# Patient Record
Sex: FEMALE | Race: WHITE | NOT HISPANIC OR LATINO | Employment: FULL TIME | URBAN - METROPOLITAN AREA
[De-identification: names, ages, dates, MRNs, and addresses within clinical notes are randomized per-mention and may not be internally consistent; named-entity substitution may affect disease eponyms.]

---

## 2021-07-20 ENCOUNTER — LAB REQUISITION (OUTPATIENT)
Dept: LAB | Facility: CLINIC | Age: 31
End: 2021-07-20

## 2021-07-20 LAB
PATH REPORT.COMMENTS IMP SPEC: NORMAL
PATH REPORT.FINAL DX SPEC: NORMAL
PATH REPORT.GROSS SPEC: NORMAL
PATH REPORT.MICROSCOPIC SPEC OTHER STN: NORMAL
PATH REPORT.MICROSCOPIC SPEC OTHER STN: NORMAL
PATH REPORT.RELEVANT HX SPEC: NORMAL
PATH REPORT.RELEVANT HX SPEC: NORMAL
PATH REPORT.SITE OF ORIGIN SPEC: NORMAL

## 2021-07-20 PROCEDURE — 88323 CONSLTJ&REPRT MATRL PREP SLD: CPT | Mod: TC | Performed by: PATHOLOGY

## 2021-07-21 ENCOUNTER — TRANSFERRED RECORDS (OUTPATIENT)
Dept: HEALTH INFORMATION MANAGEMENT | Facility: CLINIC | Age: 31
End: 2021-07-21

## 2021-07-22 LAB
ALT SERPL-CCNC: 143 U/L (ref 0–55)
AST SERPL-CCNC: 109 U/L (ref 10–40)
INR (EXTERNAL): 2.3 (ref 0.9–1.1)

## 2021-07-22 NOTE — PROGRESS NOTES
[facility]  Transfer Triage Note    Date of call: 07/22/21  Time of call: 11:41 AM    Is pandemic COVID-19 a concern? NO    Reason for transfer: Further diagnostic work up, management, and consultation for specialized care   Diagnosis: acute hepatic failure    Outside Records: Available  Additional records requested to be faxed to [phone].    Stability of Patient: Patient is vitally stable, with no critical labs, and will likely remain stable throughout the transfer process  ICU: No    Expected Time of Arrival for Transfer: greater than 24 hours    Arrival Location:  [address] [address] Phone: [phone]    Recommendations for Management and Stabilization: Not needed    Additional Comments     Delivered end of January.  Pregnancy complicated by gestational hypertension and mild DM    Requesting transfer for liver failure, presumed autoimmune.    Previously healthy, presented with jaundice and sleral icterus.  Viral hepatitis studies were negative.  Liver biopsies showed non-neoplastic pathology, determined to be autoimmune hepatitis with necrotic collapse.      Discharged on prednisone 40mg daily and followed by GI as an outpatient.  Had labs done yesterday (7/21); however on her way home she had an episode of syncope.  Was hypotensive and hypoglycemic in the ED.  Rec'd fluids and responded appropriately.  Started on abx.    Has remained hypoglycemic with glucose of 61 and rising INR, despite stable lfts.  Concern is for acute hepatitis not responsive to steroids.  Dr. Borden is recommending that she transferred as a higher priority for transplant evaluation and if we are unable to secure a bed in the next 1-2 days, consider transfer to [place].    Rocio Joseph MD

## 2021-07-23 ENCOUNTER — DOCUMENTATION ONLY (OUTPATIENT)
Dept: TRANSPLANT | Facility: CLINIC | Age: 31
End: 2021-07-23

## 2021-07-23 ENCOUNTER — HOSPITAL ENCOUNTER (INPATIENT)
Facility: CLINIC | Age: 31
LOS: 3 days | Discharge: HOME | DRG: 442 | End: 2021-07-26
Attending: INTERNAL MEDICINE | Admitting: INTERNAL MEDICINE
Payer: COMMERCIAL

## 2021-07-23 ENCOUNTER — REFERRAL (OUTPATIENT)
Dept: TRANSPLANT | Facility: CLINIC | Age: 31
End: 2021-07-23

## 2021-07-23 ENCOUNTER — APPOINTMENT (OUTPATIENT)
Dept: GENERAL RADIOLOGY | Facility: CLINIC | Age: 31
DRG: 442 | End: 2021-07-23
Attending: INTERNAL MEDICINE
Payer: COMMERCIAL

## 2021-07-23 DIAGNOSIS — K75.4 AUTOIMMUNE HEPATITIS (H): Primary | ICD-10-CM

## 2021-07-23 DIAGNOSIS — R16.0 LIVER MASSES: Primary | ICD-10-CM

## 2021-07-23 LAB
ABO/RH(D): NORMAL
ALBUMIN SERPL-MCNC: 2.3 G/DL (ref 3.4–5)
ALP SERPL-CCNC: 161 U/L (ref 40–150)
ALT SERPL W P-5'-P-CCNC: 159 U/L (ref 0–50)
ANION GAP SERPL CALCULATED.3IONS-SCNC: 3 MMOL/L (ref 3–14)
ANTIBODY SCREEN: NEGATIVE
APTT PPP: 36 SECONDS (ref 22–38)
AST SERPL W P-5'-P-CCNC: 117 U/L (ref 0–45)
BASOPHILS # BLD AUTO: 0.1 10E3/UL (ref 0–0.2)
BASOPHILS NFR BLD AUTO: 0 %
BILIRUB DIRECT SERPL-MCNC: 8.4 MG/DL (ref 0–0.2)
BILIRUB SERPL-MCNC: 11.6 MG/DL (ref 0.2–1.3)
BUN SERPL-MCNC: 12 MG/DL (ref 7–30)
CALCIUM SERPL-MCNC: 8.2 MG/DL (ref 8.5–10.1)
CHLORIDE BLD-SCNC: 108 MMOL/L (ref 94–109)
CHOLEST SERPL-MCNC: 79 MG/DL
CO2 SERPL-SCNC: 27 MMOL/L (ref 20–32)
CREAT SERPL-MCNC: 0.66 MG/DL (ref 0.52–1.04)
EOSINOPHIL # BLD AUTO: 0 10E3/UL (ref 0–0.7)
EOSINOPHIL NFR BLD AUTO: 0 %
ERYTHROCYTE [DISTWIDTH] IN BLOOD BY AUTOMATED COUNT: 21.4 % (ref 10–15)
FASTING STATUS PATIENT QL REPORTED: NO
FIBRINOGEN PPP-MCNC: 146 MG/DL (ref 170–490)
GFR SERPL CREATININE-BSD FRML MDRD: >90 ML/MIN/1.73M2
GLUCOSE BLD-MCNC: 170 MG/DL (ref 70–99)
GLUCOSE BLDC GLUCOMTR-MCNC: 104 MG/DL (ref 70–99)
HCT VFR BLD AUTO: 39.9 % (ref 35–47)
HDLC SERPL-MCNC: 20 MG/DL
HGB BLD-MCNC: 13.2 G/DL (ref 11.7–15.7)
IMM GRANULOCYTES # BLD: 0.4 10E3/UL
IMM GRANULOCYTES NFR BLD: 4 %
INR PPP: 2.16 (ref 0.85–1.15)
IRON SATN MFR SERPL: 39 % (ref 15–46)
IRON SERPL-MCNC: 95 UG/DL (ref 35–180)
LDLC SERPL CALC-MCNC: 41 MG/DL
LYMPHOCYTES # BLD AUTO: 1.3 10E3/UL (ref 0.8–5.3)
LYMPHOCYTES NFR BLD AUTO: 11 %
MCH RBC QN AUTO: 28.9 PG (ref 26.5–33)
MCHC RBC AUTO-ENTMCNC: 33.1 G/DL (ref 31.5–36.5)
MCV RBC AUTO: 88 FL (ref 78–100)
MONOCYTES # BLD AUTO: 0.6 10E3/UL (ref 0–1.3)
MONOCYTES NFR BLD AUTO: 5 %
NEUTROPHILS # BLD AUTO: 9.6 10E3/UL (ref 1.6–8.3)
NEUTROPHILS NFR BLD AUTO: 80 %
NONHDLC SERPL-MCNC: 59 MG/DL
NRBC # BLD AUTO: 0 10E3/UL
NRBC BLD AUTO-RTO: 0 /100
PHOSPHATE SERPL-MCNC: 1.9 MG/DL (ref 2.5–4.5)
PLATELET # BLD AUTO: 116 10E3/UL (ref 150–450)
POTASSIUM BLD-SCNC: 3.8 MMOL/L (ref 3.4–5.3)
PROT SERPL-MCNC: 5.2 G/DL (ref 6.8–8.8)
RBC # BLD AUTO: 4.56 10E6/UL (ref 3.8–5.2)
SODIUM SERPL-SCNC: 138 MMOL/L (ref 133–144)
SPECIMEN EXPIRATION DATE: NORMAL
T4 FREE SERPL-MCNC: 1.06 NG/DL (ref 0.76–1.46)
TIBC SERPL-MCNC: 246 UG/DL (ref 240–430)
TRIGL SERPL-MCNC: 90 MG/DL
TSH SERPL DL<=0.005 MIU/L-ACNC: 0.17 MU/L (ref 0.4–4)
WBC # BLD AUTO: 12 10E3/UL (ref 4–11)

## 2021-07-23 PROCEDURE — 80321 ALCOHOLS BIOMARKERS 1OR 2: CPT | Performed by: INTERNAL MEDICINE

## 2021-07-23 PROCEDURE — 83550 IRON BINDING TEST: CPT | Performed by: INTERNAL MEDICINE

## 2021-07-23 PROCEDURE — 85730 THROMBOPLASTIN TIME PARTIAL: CPT | Performed by: INTERNAL MEDICINE

## 2021-07-23 PROCEDURE — 86708 HEPATITIS A ANTIBODY: CPT | Performed by: INTERNAL MEDICINE

## 2021-07-23 PROCEDURE — 84100 ASSAY OF PHOSPHORUS: CPT | Performed by: INTERNAL MEDICINE

## 2021-07-23 PROCEDURE — 82107 ALPHA-FETOPROTEIN L3: CPT | Performed by: INTERNAL MEDICINE

## 2021-07-23 PROCEDURE — 85384 FIBRINOGEN ACTIVITY: CPT | Performed by: INTERNAL MEDICINE

## 2021-07-23 PROCEDURE — 86644 CMV ANTIBODY: CPT | Performed by: INTERNAL MEDICINE

## 2021-07-23 PROCEDURE — 87389 HIV-1 AG W/HIV-1&-2 AB AG IA: CPT | Performed by: INTERNAL MEDICINE

## 2021-07-23 PROCEDURE — 99223 1ST HOSP IP/OBS HIGH 75: CPT | Mod: AI | Performed by: STUDENT IN AN ORGANIZED HEALTH CARE EDUCATION/TRAINING PROGRAM

## 2021-07-23 PROCEDURE — 99223 1ST HOSP IP/OBS HIGH 75: CPT | Mod: GC | Performed by: INTERNAL MEDICINE

## 2021-07-23 PROCEDURE — 999N000054 HC STATISTIC EKG NON-CHARGEABLE

## 2021-07-23 PROCEDURE — 80061 LIPID PANEL: CPT | Performed by: INTERNAL MEDICINE

## 2021-07-23 PROCEDURE — 82248 BILIRUBIN DIRECT: CPT | Performed by: INTERNAL MEDICINE

## 2021-07-23 PROCEDURE — 85610 PROTHROMBIN TIME: CPT | Performed by: INTERNAL MEDICINE

## 2021-07-23 PROCEDURE — 87340 HEPATITIS B SURFACE AG IA: CPT | Performed by: INTERNAL MEDICINE

## 2021-07-23 PROCEDURE — 84443 ASSAY THYROID STIM HORMONE: CPT | Performed by: INTERNAL MEDICINE

## 2021-07-23 PROCEDURE — 86780 TREPONEMA PALLIDUM: CPT | Performed by: INTERNAL MEDICINE

## 2021-07-23 PROCEDURE — 86803 HEPATITIS C AB TEST: CPT | Performed by: INTERNAL MEDICINE

## 2021-07-23 PROCEDURE — 85025 COMPLETE CBC W/AUTO DIFF WBC: CPT | Performed by: INTERNAL MEDICINE

## 2021-07-23 PROCEDURE — 86665 EPSTEIN-BARR CAPSID VCA: CPT | Performed by: INTERNAL MEDICINE

## 2021-07-23 PROCEDURE — 86704 HEP B CORE ANTIBODY TOTAL: CPT | Performed by: INTERNAL MEDICINE

## 2021-07-23 PROCEDURE — 86900 BLOOD TYPING SEROLOGIC ABO: CPT | Performed by: INTERNAL MEDICINE

## 2021-07-23 PROCEDURE — 71046 X-RAY EXAM CHEST 2 VIEWS: CPT | Mod: 26 | Performed by: RADIOLOGY

## 2021-07-23 PROCEDURE — 80053 COMPREHEN METABOLIC PANEL: CPT | Performed by: INTERNAL MEDICINE

## 2021-07-23 PROCEDURE — 93010 ELECTROCARDIOGRAM REPORT: CPT | Mod: 59 | Performed by: INTERNAL MEDICINE

## 2021-07-23 PROCEDURE — 120N000002 HC R&B MED SURG/OB UMMC

## 2021-07-23 PROCEDURE — 71046 X-RAY EXAM CHEST 2 VIEWS: CPT

## 2021-07-23 PROCEDURE — 84439 ASSAY OF FREE THYROXINE: CPT | Performed by: INTERNAL MEDICINE

## 2021-07-23 PROCEDURE — 36415 COLL VENOUS BLD VENIPUNCTURE: CPT | Performed by: INTERNAL MEDICINE

## 2021-07-23 PROCEDURE — 86706 HEP B SURFACE ANTIBODY: CPT | Performed by: INTERNAL MEDICINE

## 2021-07-23 RX ORDER — PREDNISONE 5 MG/1
40 TABLET ORAL DAILY
Status: ON HOLD | COMMUNITY
End: 2021-07-26

## 2021-07-23 RX ORDER — LIDOCAINE 40 MG/G
CREAM TOPICAL
Status: DISCONTINUED | OUTPATIENT
Start: 2021-07-23 | End: 2021-07-26 | Stop reason: HOSPADM

## 2021-07-23 ASSESSMENT — ACTIVITIES OF DAILY LIVING (ADL): ADLS_ACUITY_SCORE: 14

## 2021-07-23 NOTE — PHARMACY-ADMISSION MEDICATION HISTORY
Pharmacy Admission Medication History    Admission medication history interview status for the 7/23/2021 admission is complete. See EPIC admission navigator for allergy information, prior to admission medications and immunization status.     Medication history interview source(s): Patient    Medication history resources (including written lists, pill bottles, clinic record): None    Medication history source reliability: Good    Primary pharmacy: [place], [place]    Actions taken by pharmacist (provider contacted, medication changes, etc):None     Changes made to medication history:    Added to medication list: Prednisone tabs.    Additional medication history information: Patient was started on a Prednisone taper 40 mg>>30mg>>20mg>>10 mg>>5mg. After the 40 mg dose taken for 2 weeks, she is supposed to taper down weekly. She is due to complete the 40 mg dose on TUESDAY 7/27    Medication reconciliation/reorder completed by provider prior to medication history? No    Time spent in this activity: 20 MINUTES    Prior to Admission medications    Medication Sig Last Dose Taking? Auth Provider   predniSONE (DELTASONE) 5 MG tablet Take 40 mg by mouth daily 7/23/2021 at Unknown time Yes Unknown, Entered By History

## 2021-07-23 NOTE — PROGRESS NOTES
CT 7/3/21  1.) no PVT    MRI 7/23/21  1.) focal lesions   > hepatic veins not well seen       Recs: needs transfer and full evaluation    Malignancy not in discussion.

## 2021-07-23 NOTE — H&P
[facility]    History and Physical - [facility]        Date of Admission:  7/23/2021    Assessment & Plan      Yaritza Dias is a 30 year old female admitted on 7/23/2021 for evaluation for of acute liver failure. She was recently diagnosed with autoimmune hepatitis and presents for transplant evaluation given worsening of synthetic function.    #Acute to subacute liver failure of unknown etiology (recent MELD-Na 26)  #Severe autoimmune hepatitis with necrosis  Recent diagnosis of autoimmune hepatitis diagnosed after she was found to be jaundiced with elevated LFTs in early July.  Work up including MRI and liver biopsy completed at [facility].  MRI showing multiple liver masses.  Biopsy severe acute hepatitis with hepatocyte loss, no evidence of neoplastic process.  Started on Prednisone 40 on 7/14. HIV negative, ROLANDO positive at 1:640 F-actin negative. Did have some improvement in transaminases, however continued to have a climbing INR, therefore transferred to the [facility] on 7/23 for further work up/ transplant evaluation   MELD-Na score: 25 at 7/23/2021  5:47 PM  MELD score: 25 at 7/23/2021  5:47 PM  Calculated from:  Serum Creatinine: 0.66 mg/dL (Using min of 1 mg/dL) at 7/23/2021  5:47 PM  Serum Sodium: 138 mmol/L (Using max of 137 mmol/L) at 7/23/2021  5:47 PM  Total Bilirubin: 11.6 mg/dL at 7/23/2021  5:47 PM  INR(ratio): 2.16 at 7/23/2021  5:46 PM  Age: 30 years  -GI consult, appreciate recs   -- Starting transplant workup, consult transplant surgery and SW as a part of this evaluation  -- 2D ECHO - OSH TTE did not comment on PA pressures  -- Monitor neurological status closely, contact GI fellow if any concern for worsening mental status  -- BID CMP, CBC, INR. Check factor 5 daily  -- Frequent glucose checks given she is at risk for hypoglycemia. Was on D5 gtt at OSH  -- Panculture and rule out infection with leukocytosis  -- Recommend to  check HSV PCR  -- Check Phosphorus daily, and replace if low.   -- Start Prednisone 30 mg from tomorrow     # SIRS  Met SIRS criteria on presentation Concern for infection on presentation to [facility] and treated with vanc and cefepime.  She had no associated fever or other symptoms. Her cultures have been no growth to date. She did have a mild leukocytosis however was also on steroids at the time.  Overall she is very well appearing without fevers or infectious symptoms.     - Will hold on further antibiotics at this time     #Hypoglycemia  Recent episodes of hypoglycemia while admitted at [facility].  This is concerning for worsening hepatic function, however may also be related to multiple periods of NPO status.  - Will continue to follow glucoses        Diet: Regular Diet Adult    DVT Prophylaxis: Low Risk/Ambulatory with no VTE prophylaxis indicated  Oro Catheter: Not present  Fluids: PO ad india  Central Lines: None  Code Status: Full Code      Risk Factors Present on Admission             # Coagulation Defect: INR = 2.16 (Ref range: 0.85 - 1.15) and/or PTT = 36 Seconds (Ref range: 22 - 38 Seconds) on admission, will monitor for bleeding  # Thrombocytopenia: Plts = 116 10e3/uL (Ref range: 150 - 450 10e3/uL) on admission, will monitor for bleeding      Disposition Plan   Expected discharge: Anticipate >2 days recommended to prior living arrangement once liver function stabilized.     The patient's care was discussed with the Attending Physician, Dr. Schilling.    Eladia Kimbrough MD  [address]  Securely message with the Vocera Web Console (learn more here)  Text page via [employer] Paging/Directory  Please see sign in/sign out for up to date coverage information  ______________________________________________________________________    Chief Complaint   Autoimmune hepatitits    History is obtained from the patient and through chart review    History  "of Present Illness   Yaritza Dias is a 30 year old female who has a history of recent diagnosis of autoimmune hepatitis and is admitted for transplant work up given worsening synthetic function.    She had been overall healthy and gave birth to her daughter 5 months ago.  She was doing well until late June when she started noticing some jaundine. \"She had initially presented to the ED on July 3rd with reports of 1 week history of progressive jaundice and icterus as well as some upper abdominal discomfort. Imaging had revealed multiple liver masses and labs revealed an ALT of 800 with mild coagulopathy and splenomegaly. Hepatitis AB and C labs were unremarkable and patient's ROLANDO was elevated at 1 in 640 with a negative F actin in normal mitochondrial antibody. Tumor markers were also negative and liver biopsy revealed likely severe autoimmune hepatitis with necrosis. She was started on 40 mg of prednisone on July 14th with plans to start azathioprine within 2 weeks. On July 18th she had repeat labs in liver clinic which revealed a worsening alkaline phos and worsening INR. Her case was discussed with Dr. Lombardi (hepatology at the [facility]) and they also got 2nd opinion pathology at the  in which they concur that this is most likely autoimmune hepatitis with necrotic collapse. She was only to present to the [facility] if her INR went above 2.4.\"    She was admitted to [facility] after an episode of syncope with associated hypotension.  She was treated with empiric antibiotics and remained vitally stable after fluid resuscitation.  She did have some intermittent hypoglycemia during her time at [facility].  She was transferred to the [facility] for transplant work up.    Review of Systems    The 10 point Review of Systems is negative other than noted in the HPI or here. She notes that she has noticed feeling puffy and increase in back acne recently.    Past Medical History    I have reviewed this " patient's medical history and updated it with pertinent information if needed.   Past Medical History:   Diagnosis Date     Gestational diabetes         Past Surgical History   I have reviewed this patient's surgical history and updated it with pertinent information if needed.  History reviewed. No pertinent surgical history.     Social History   I have reviewed this patient's social history and updated it with pertinent information if needed. Yaritza Dias lives at home with her , Jose R and their 5 month old daughter Tesha.  Very rare social alcohol use.  No drug use and no supplements .  reports that she has never smoked. She has never used smokeless tobacco. She reports that she does not use drugs.    Family History   No family history of liver disease.  Does have a sister with celiac disease.  No other family members with autoimmune disease    Prior to Admission Medications   Prior to Admission Medications   Prescriptions Last Dose Informant Patient Reported? Taking?   predniSONE (DELTASONE) 5 MG tablet 7/23/2021 at Unknown time  Yes Yes   Sig: Take 40 mg by mouth daily      Facility-Administered Medications: None     Allergies   Allergies   Allergen Reactions     Penicillins GI Disturbance       Physical Exam   Vital Signs: Temp: 97.7  F (36.5  C)   BP: (!) 152/100 (patient very nervous being here. ) Pulse: 110   Resp: 18 SpO2: 98 % O2 Device: None (Room air)    Weight: 202 lbs 6.4 oz    General : Well appearing sitting in bed in no acute distress, pleasant and conversant  HEENT: Scleral icterus, no conjunctival injection, MMM  Chest: Breathing comfortably on room air, lungs clear to auscultation bilaterally , no wheezes or crackles   Heart:  RRR, no murmurs  Abdomen: soft, non-tender, non-distended, no fluid wave  Neuro: Alert and oriented   Ext: Warm and well perfused without edema    Skin: comedones on back and shoulders   Psych: appropriate mood and affect, intermittently tearful     Data   Data  reviewed today: I reviewed all medications, new labs and imaging results over the last 24 hours. I personally reviewed the chest x-ray image(s) showing focal consolidation.    Recent Labs   Lab 07/23/21 1747 07/23/21 1746   WBC  --  12.0*   HGB  --  13.2   MCV  --  88   PLT  --  116*   INR  --  2.16*     --    POTASSIUM 3.8  --    CHLORIDE 108  --    CO2 27  --    BUN 12  --    CR 0.66  --    ANIONGAP 3  --    KENDY 8.2*  --    *  --    ALBUMIN 2.3*  --    PROTTOTAL 5.2*  --    BILITOTAL 11.6*  --    ALKPHOS 161*  --    *  --    *  --        Most Recent 3 CBC's:  Recent Labs   Lab Test 07/23/21 1746   WBC 12.0*   HGB 13.2   MCV 88   *     Most Recent 3 BMP's:  Recent Labs   Lab Test 07/23/21 1747      POTASSIUM 3.8   CHLORIDE 108   CO2 27   BUN 12   CR 0.66   ANIONGAP 3   KENDY 8.2*   *     Most Recent 2 LFT's:  Recent Labs   Lab Test 07/23/21 1747   *   *   ALKPHOS 161*   BILITOTAL 11.6*     Most Recent 3 INR's:  Recent Labs   Lab Test 07/23/21 1746   INR 2.16*     No results found for this or any previous visit (from the past 24 hour(s)).

## 2021-07-23 NOTE — CONSULTS
[facility]    Hepatology Consult    Requesting provider: Dr. Joseph    Consult requested for Elevated bili      HPI:  30 year old female with no significant past medical history, who was recently diagnosed with autoimmune hepatitis with necrosis at outside hospital.    Patient stated that she  delivered her baby in January, which was a prolonged labor and lost significant blood during her delivery, will started on iron supplement after that.  She had her first Covid shot on May 2 followed by the second shot on May 21.  Her  noticed her eyes becoming yellow in late June, she went to them to her PCP and got lab work on 7/2 which was concerning for elevated liver enzymes, she was sent to [facility] where her liver enzymes showed elevated ALT of 800 with mild coagulopathy and imaging showed liver masses and splenomegaly.  She underwent liver biopsy revealing severe necrosis, was started on prednisone 40 mg on July 14.  She had repeat labs done on July 18 showing worsening alkaline phosphatase and INR.  Patient also had hypotension upon her outpatient lab draw, upon second admission her white count was elevated concerning for sepsis, she was treated with antibiotics.  Her INR slightly went up to 2.2, patient was transferred to [facility] for further evaluation.    Denies any alcohol intake, no smoking or illicit drug use.  She works from home.    Visited [place] in March, had a short trip to [place] in June.    Father had coronary artery disease, sister has celiac disease, no other autoimmune disorders in family.    She had mild AST/ALT elevations during her pregnancy. No previous liver imaging      Medical hx Surgical hx   No past medical history on file.  Mild AST/ALT elevation  Vaginal Delivery 1/2021 No past surgical history on file.  None     Medications  Prednisone 40 mg daily    Allergies  Not on File    Family hx Social hx   No family history on file.   Social History      Tobacco Use    Smoking status: Not on file   Substance Use Topics    Alcohol use: Not on file    Drug use: Not on file          Review of systems  A 10-point review of systems was negative.      Examination  BP (!) 152/100   Pulse 110   Temp 97.7  F (36.5  C)   Resp 18   Wt 91.8 kg (202 lb 6.4 oz)   SpO2 98%     Gen- well, NAD, A+Ox3, jaundiced  Eye- EOMI, scleral icterus  ENT- MMM  Lym- no palpable LAD  CVS- RRR  RS- CTA  Abd-non-distended, NT, no fluid thrill  Extr- 1+  LEONORA  Neuro- no asterixis  Skin- no rash    Laboratory  MELD-Na 25    Radiology    MRI ABD 7/3/21:  IMPRESSION:   1.  Multiple liver masses are indeterminate. Regenerative nodules are favored. Metastatic disease is less likely given the patient's age. Atypical presentation of a primary liver mass is possible. Follow-up MRI could be considered. Biopsy could be considered.   2.  Mild splenomegaly is indeterminate. Portal hypertension could contribute to splenomegaly.   3.  Mild pericholecystic fluid is nonspecific. Acute cholecystitis is not excluded. There is no bile duct dilatation. No biliary filling defects or strictures identified.       MRI ABD 7/22/21:  IMPRESSION:   1.  Edematous gallbladder wall thickening has increased. Trace pericholecystic fluid is unchanged. Findings may indicate acalculus cholecystitis. Consider nuclear medicine hepatobiliary scan.     2.  Stable atypical appearance of the liver consistent with nonspecific heterogeneous parenchymal disease. The hepatic veins are poorly visualized. The differential diagnosis includes venoocclusive disease.     3.  Trace peripancreatic fluid has mildly increased and suggests acute pancreatitis.         Liver Bx:  LIVER, NEEDLE BIOPSY:  Hepatitis, severe, grade 4 /4 activity:   -Etiologic considerations include vaccine-induced autoimmune hepatitis vs. Other   -No fibrosis or neoplastic/mass lesion identified      Assessment  30-year-old female with no significant past medical  history, who had recent diagnosis of autoimmune hepatitis.    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 Has a history of mild AST/ALT elevation in the past, may have been predisposed to AI hepatitis, which can also flare after childbirth.     Patient had been started on steroids, AST/ALT improved but BR slowing rising. She had elevated INR which slightly down trended for the past 2 days.  She does not has encephalopathy which goes against acute liver failure.  But given the fact that she had severe necrosis upon her liver biopsy, concern is that she is at risk for progression to liver failure requiring transplant. If her liver regenerates she will likely have scarring in her areas of necrosis.  Her case was discussed in tumor conference, imaging findings not felt to be c/w malignancy - felt to represent area of necrosis and hepatitis.     Recommend close monitoring for worsening liver function and encephalopathy, starting liver transplant evaluation.     Her phosphorus should be checked daily as the severe necrosis in the liver will start regeneration consuming phosphorus and rendering her levels to decrease.    Recommendations  -- Starting transplant workup, consult transplant surgery and SW as a part of this evaluation  -- 2D ECHO - OSH TTE did not comment on PA pressures  -- Monitor neurological status closely, contact GI fellow if any concern for worsening mental status  -- BID CMP, CBC, INR. Check factor 5 daily  -- Frequent glucose checks given she is at risk for hypoglycemia. Was on D5 gtt at OSH  -- Panculture and rule out infection with leukocytosis  -- Recommend to check HSV PCR  -- Check Phosphorus daily, and replace if low.   -- Start Prednisone 30 mg from tomorrow    Thank you for involving us in this patient's care. Please do not hesitate to contact the GI service with any questions or concerns.     Pt care plan discussed with Dr. Borden, hepatology staff  physician.    This note was created with voice recognition software, and while reviewed for accuracy, typos may remain.  Karen Paris MD  Hepatology  #[id number]

## 2021-07-23 NOTE — PROGRESS NOTES
Dr. CHAU Bodren, on -call hepatologist, notified patient admitted to [facility].  On-call transplant coordinator also notified

## 2021-07-23 NOTE — PROGRESS NOTES
Patient admitted from [facility]. Patient is alert and orientated. Skin check completed with mikal rivero. Patient brought purse shirt pants and shoes. Patient is independent in room . Gave the policy regarding covid visiting   And who can visit. Upset that her 5 month daughter can not visit.

## 2021-07-23 NOTE — TELEPHONE ENCOUNTER
New liver referral    Patient currently admitted to [facility] and is process of transferring to [facility] to urgent evaluation.    Recent dx of autoimmune hepatitis with decompensation and indeterminate liver masses seen on recent MRI.    MELD 24 on 7/23/21    Patient currently having issues with hypotension and hypoglycemia.     From GI consult note in [place] 7/21:  HISTORY OF PRESENT ILLNESS: 30 y.o.female who had previously been healthy was recently diagnosed with acute liver failure thought secondary to severe autoimmune hepatitis with necrosis (hepatic masses). She had initially presented to the ED on July 3rd with reports of 1 week history of progressive jaundice and icterus as well as some upper abdominal discomfort. Imaging had revealed multiple liver masses and labs revealed an ALT of 800 with mild coagulopathy and splenomegaly. Hepatitis AB and C labs were unremarkable and patient's ROLANDO was elevated at 1 in 640 with a negative F actin in normal mitochondrial antibody. Tumor markers were also negative and liver biopsy revealed likely severe autoimmune hepatitis with necrosis. She was started on 40 mg of prednisone on July 14th with plans to start azathioprine within 2 weeks. On July 18th she had repeat labs in liver clinic which revealed a worsening alkaline phos and worsening INR. Her case was discussed with Dr. Lombardi (hepatology at the [facility]) and they also got 2nd opinion pathology at the  in which they concur that this is most likely autoimmune hepatitis with necrotic collapse. She was only to present to the [facility] if her INR went above 2.4.    The outpatient plan was to repeat labs this morning and obtain repeat liver MRI tomorrow. She reports she felt well this morning if although she was having some anxiety related to the labs. She is also feeling slightly nauseous but thought that was because she had eaten. Immediately after the lab draw she felt unwell with extreme dizziness and nausea so her   drove her to [facility] ER in which her blood pressure was found to be extremely low with systolic in the 40s to 50s. She was also tachycardic and patient was roomed and started on antibiotics for concern of underlying sepsis. After getting IV fluid she reports she is feeling much better. She denies any fevers or chills at home. She is currently on 40 mg of prednisone once daily and reports weight gain in the last month as opposed to weight loss.     IMPRESSION:  Acute liver failure, stable  Likely severe autoimmune hepatitis with necrotic collapse (seen as masses on imaging)  Hypotension  Tachycardia  Patient with recent diagnosis of acute liver failure and liklely severe autoimmune hepatitis (7/3) was being followed in liver clinic and recently started on prednisone (7/14). She had an ROLANDO of 1 in 640 but negative F actin and pathology was most consistent with autoimmune hepatitis per [facility] pathology and [facility] pathology. She had a bump in alkaline phos and INR on 07/18 and she is feeling very anxious regarding a repeat outpatient lab test this morning. Immediately after the lab draw she felt unwell and weak and was found to have a blood pressure of 40s over 20 is a in the ER. She denies any fevers or chills at home and she has had mild leukocytosis in the last few days but this was after starting prednisone which is expected. She does not appear to be septic and most likely had a vasovagal reaction to the lab draw but given her persistent tachycardia and recently worsening liver function tests despite prednisone, cannot exclude an underlying infectious process now becoming worse on prednisone. Also cannot exclude intrahepatic blocked bile duct secondary to the necrotic masses which could cause cholestasis and ultimately cholangitis. Would recommend keeping patient on antibiotics until we have repeat MRI imaging to compare from her previous study.    In regards to the liver failure, this is  very stable and her INR remains under 2.4. She has no evidence of hepatic encephalopathy and platelets have now normalized. Her ALT has improved on its own from peak at 800 down to 166 and alk-phos only mildly elevated at 151. T bili remains elevated at 12.3 which is consistent with LFT derangements in the setting of autoimmune hepatitis. For now, would continue 40 mg of prednisone daily with daily LFTs and INR. No plans to transfer to the [facility] unless her INR creeps up above 2.4 signifying worsening liver failure.     PLAN/RECOMMENDATIONS:  1. Obtain liver MRI today  2. Diet as tolerated after MRI  3. Continue abx for now.   4. Follow up on blood cultures  5. Trend daily LFts, WBC and INR  6. Continue 40 mg prednisone daily  7. Gi will continue to follow

## 2021-07-24 ENCOUNTER — APPOINTMENT (OUTPATIENT)
Dept: ULTRASOUND IMAGING | Facility: CLINIC | Age: 31
DRG: 442 | End: 2021-07-24
Attending: STUDENT IN AN ORGANIZED HEALTH CARE EDUCATION/TRAINING PROGRAM
Payer: COMMERCIAL

## 2021-07-24 LAB
ALBUMIN SERPL-MCNC: 2 G/DL (ref 3.4–5)
ALBUMIN SERPL-MCNC: 2 G/DL (ref 3.4–5)
ALP SERPL-CCNC: 178 U/L (ref 40–150)
ALT SERPL W P-5'-P-CCNC: 144 U/L (ref 0–50)
AMMONIA PLAS-SCNC: <10 UMOL/L (ref 10–50)
ANION GAP SERPL CALCULATED.3IONS-SCNC: 4 MMOL/L (ref 3–14)
AST SERPL W P-5'-P-CCNC: 138 U/L (ref 0–45)
BILIRUB DIRECT SERPL-MCNC: 7.1 MG/DL (ref 0–0.2)
BILIRUB SERPL-MCNC: 9.3 MG/DL (ref 0.2–1.3)
BUN SERPL-MCNC: 10 MG/DL (ref 7–30)
CALCIUM SERPL-MCNC: 8 MG/DL (ref 8.5–10.1)
CHLORIDE BLD-SCNC: 109 MMOL/L (ref 94–109)
CO2 SERPL-SCNC: 27 MMOL/L (ref 20–32)
CREAT SERPL-MCNC: 0.71 MG/DL (ref 0.52–1.04)
FACT V ACT/NOR PPP: 34 % (ref 60–140)
GFR SERPL CREATININE-BSD FRML MDRD: >90 ML/MIN/1.73M2
GLUCOSE BLD-MCNC: 98 MG/DL (ref 70–99)
GLUCOSE BLDC GLUCOMTR-MCNC: 125 MG/DL (ref 70–99)
HBV CORE AB SERPL QL IA: NONREACTIVE
HBV SURFACE AG SERPL QL IA: NONREACTIVE
HCV AB SERPL QL IA: NONREACTIVE
INR PPP: 2.05 (ref 0.85–1.15)
INR PPP: 2.24 (ref 0.85–1.15)
PHOSPHATE SERPL-MCNC: 4.3 MG/DL (ref 2.5–4.5)
PHOSPHATE SERPL-MCNC: 5 MG/DL (ref 2.5–4.5)
POTASSIUM BLD-SCNC: 3.8 MMOL/L (ref 3.4–5.3)
PROT SERPL-MCNC: 4.8 G/DL (ref 6.8–8.8)
SODIUM SERPL-SCNC: 140 MMOL/L (ref 133–144)
T PALLIDUM AB SER QL: NONREACTIVE

## 2021-07-24 PROCEDURE — 93971 EXTREMITY STUDY: CPT | Mod: 26 | Performed by: RADIOLOGY

## 2021-07-24 PROCEDURE — 999N000147 HC STATISTIC PT IP EVAL DEFER

## 2021-07-24 PROCEDURE — 85610 PROTHROMBIN TIME: CPT | Performed by: STUDENT IN AN ORGANIZED HEALTH CARE EDUCATION/TRAINING PROGRAM

## 2021-07-24 PROCEDURE — 82140 ASSAY OF AMMONIA: CPT | Performed by: STUDENT IN AN ORGANIZED HEALTH CARE EDUCATION/TRAINING PROGRAM

## 2021-07-24 PROCEDURE — 80053 COMPREHEN METABOLIC PANEL: CPT | Performed by: STUDENT IN AN ORGANIZED HEALTH CARE EDUCATION/TRAINING PROGRAM

## 2021-07-24 PROCEDURE — 84100 ASSAY OF PHOSPHORUS: CPT | Performed by: INTERNAL MEDICINE

## 2021-07-24 PROCEDURE — 36415 COLL VENOUS BLD VENIPUNCTURE: CPT | Performed by: STUDENT IN AN ORGANIZED HEALTH CARE EDUCATION/TRAINING PROGRAM

## 2021-07-24 PROCEDURE — 93971 EXTREMITY STUDY: CPT | Mod: RT

## 2021-07-24 PROCEDURE — 250N000009 HC RX 250: Performed by: INTERNAL MEDICINE

## 2021-07-24 PROCEDURE — 85220 BLOOC CLOT FACTOR V TEST: CPT | Performed by: STUDENT IN AN ORGANIZED HEALTH CARE EDUCATION/TRAINING PROGRAM

## 2021-07-24 PROCEDURE — 99233 SBSQ HOSP IP/OBS HIGH 50: CPT | Performed by: STUDENT IN AN ORGANIZED HEALTH CARE EDUCATION/TRAINING PROGRAM

## 2021-07-24 PROCEDURE — 36415 COLL VENOUS BLD VENIPUNCTURE: CPT | Performed by: INTERNAL MEDICINE

## 2021-07-24 PROCEDURE — 250N000012 HC RX MED GY IP 250 OP 636 PS 637: Performed by: STUDENT IN AN ORGANIZED HEALTH CARE EDUCATION/TRAINING PROGRAM

## 2021-07-24 PROCEDURE — 999N000111 HC STATISTIC OT IP EVAL DEFER

## 2021-07-24 PROCEDURE — 258N000003 HC RX IP 258 OP 636: Performed by: INTERNAL MEDICINE

## 2021-07-24 PROCEDURE — 120N000002 HC R&B MED SURG/OB UMMC

## 2021-07-24 PROCEDURE — 84100 ASSAY OF PHOSPHORUS: CPT | Performed by: STUDENT IN AN ORGANIZED HEALTH CARE EDUCATION/TRAINING PROGRAM

## 2021-07-24 RX ADMIN — Medication 15 MMOL: at 02:05

## 2021-07-24 RX ADMIN — PREDNISONE 30 MG: 20 TABLET ORAL at 08:33

## 2021-07-24 RX ADMIN — Medication 15 MMOL: at 04:24

## 2021-07-24 ASSESSMENT — MIFFLIN-ST. JEOR: SCORE: 1630.12

## 2021-07-24 ASSESSMENT — ACTIVITIES OF DAILY LIVING (ADL)
ADLS_ACUITY_SCORE: 14

## 2021-07-24 NOTE — PLAN OF CARE
[id number]: Patient denies pain. Good appetite with meals. Up independently. Voiding spontaneously in adequate amounts. Phosphorus level 4.3. Went down to the [facility] area for an hour or so to visit with 5 month old daughter. Echo to be done tomorrow due to staffing. Right arm ultrasound completed and many labs drawn throughout the day. Will continue to with plan of care.

## 2021-07-24 NOTE — PROGRESS NOTES
07/24/21 0900   Appointment Canceled   Appointment Canceled Other (see Cancel Comments row)   Cancel Comments PT 5B: defer/cancel. Referral received. Pt walking 500' independently managing IV pole and performing 4 stairs without difficulty. No skilled PT needed at this time to discharge home IND. Will complete order and sign off.   Signing Clinician's Name / Credentials   Signing clinician's name / credentials Francisca Barajas, TANYA   Quick Adds   Rehab Discipline PT

## 2021-07-24 NOTE — PLAN OF CARE
Pt is AO*4, neuros intact. Denies pain and nausea. Pt is up independent, no BM overnight voids adequately. Pt has L IV that is running phosphate recheck will be at 1030. Breathing comfortably on RA. ON regular diet. Slept throughout shift. Follow plan of care.     Eileen Witt RN on 7/24/2021 at 6:35 AM

## 2021-07-24 NOTE — PLAN OF CARE
[facility] possible [facility] transplant workup. Still needs a urine did urinate and forgot to use hat. Phos will need to be replaced and is ordered. Patient thinks she maybe getting her menses. Patient emotional at times does have a 5 month old baby at home.offered lots of emotional time

## 2021-07-24 NOTE — PROGRESS NOTES
"CLINICAL NUTRITION SERVICES - ASSESSMENT NOTE     Nutrition Prescription    RECOMMENDATIONS FOR MDs/PROVIDERS TO ORDER:  Continue with diet as tolerated     Malnutrition Status:    Patient does not meet two of the established criteria necessary for diagnosing malnutrition    Recommendations already ordered by Registered Dietitian (RD):  Discussed pre-transplant dietary guidelines with patient     Future/Additional Recommendations:  PO adequacy        REASON FOR ASSESSMENT  Yaritza Dias is a/an 30 year old female assessed by the dietitian for Provider Order - Potential Liver Transplant Recipient Evaluation, Assess and educate SOT eval     Obtained from chart review:  Admitted on 2021 for evaluation for of acute liver failure ( started in ). She was recently diagnosed with autoimmune hepatitis and presents for transplant evaluation given worsening of synthetic function.    PMH: Gestational diabetes     MRI showing multiple liver masses.  Biopsy severe acute hepatitis with hepatocyte loss, no evidence of neoplastic process.  Started on Prednisone 40 on     MELD:25    NUTRITION HISTORY  Visited with patient today. Patient has a 5 month old baby, not breast feeding anymore.     Her PO intake has been good PTA.   Bkf: usually skips bkf  Lunch: sandwich type meal  Dinner: regular home cooked meal  Snacks in between ( fresh fruits)    CURRENT NUTRITION ORDERS  Diet: Regular  Intake/Tolerance: per above     LABS  Total bili: 11.6 ()--> down to 9.3 (today),   INR: 2.16  Ammonia: <10    B ( has had recent episodes of hypoglycemia, ?? worsening hepatic synthetic function)    BUN:10, Cr:0.71 (normal range)    Phos:1.9 (admit )--> replaced to 5.0 today  K+: 3.8,     MEDICATIONS  To start on prednisone 30 mg     ANTHROPOMETRICS  Height: 162.6 cm (5' 4.016\")  Most Recent Weight: 91.8 kg (202 lb 6.4 oz)    IBW: 54.6 kg ( 168% IBW)  BMI: 34.72 kg /m2 Obesity Grade I BMI 30-34.9 - BMI within appropriate " age for SOT candidacy     Weight History: Pre-pregnancy wt: patient was not sure of UBW  or pre-pregnancy wt     Wt Readings from Last 10 Encounters:   07/23/21 91.8 kg (202 lb 6.4 oz)       Dosing Weight: 64 kg adjusted wt from admit wt of 91.8 kg and IBW of 54.6 kg     ASSESSED NUTRITION NEEDS  Estimated Energy Needs: ~ 1600 kcals/day (~ 25 kcals/kg)  Justification: Maintenance  Estimated Protein Needs: 64- 77  grams protein/day (1 - 1.2 grams of pro/kg)  Justification: Maintenance, higher end for repletion   Estimated Fluid Needs:  (1 mL/kcal)   Justification: Maintenance    PHYSICAL FINDINGS  See malnutrition section below.  No Ascites  No Edema     MALNUTRITION  % Intake: Decreased intake does not meet criteria  % Weight Loss: Unable to assess  Subcutaneous Fat Loss: None observed  Muscle Loss: None observed  Fluid Accumulation/Edema: None noted  Malnutrition Diagnosis: Patient does not meet two of the established criteria necessary for diagnosing malnutrition    NUTRITION DIAGNOSIS  Predicted inadequate nutrient intake related to presentation with autoimmune liver dz     INTERVENTIONS  Implementation  Nutrition Education: Provided education on nutrition pre transplant, encouraged continuing to follow low sodium diet and consuming adequate protein     Goals  Patient to consume % of nutritionally adequate meal trays TID, or the equivalent with supplements/snacks.     Monitoring/Evaluation  Progress toward goals will be monitored and evaluated per protocol.    Rosa Isbell RD/LU  Pager [phone]

## 2021-07-24 NOTE — PROGRESS NOTES
[facility]    Progress Note - Radha 5 Service        Date of Admission:  7/23/2021    Assessment & Plan             Yaritza Dias is a 30 year old female admitted on 7/23/2021 for evaluation for of acute liver failure. She was recently diagnosed with autoimmune hepatitis and presents for transplant evaluation given worsening of synthetic function.     #Acute to subacute liver failure of unknown etiology (recent MELD-Na 26)  #Severe autoimmune hepatitis with necrosis  Recent diagnosis of autoimmune hepatitis diagnosed after she was found to be jaundiced with elevated LFTs in early July.  Work up including MRI and liver biopsy completed at [facility].  MRI showing multiple liver masses.  Biopsy severe acute hepatitis with hepatocyte loss, no evidence of neoplastic process.  Started on Prednisone 40 on 7/14. HIV negative, ROLANDO positive at 1:640 F-actin negative. Did have some improvement in transaminases, however continued to have a climbing INR, therefore transferred to the [facility] on 7/23 for further work up/ transplant evaluation   MELD-Na score: 24 at 7/24/2021  6:18 AM  MELD score: 24 at 7/24/2021  6:18 AM  Calculated from:  Serum Creatinine: 0.71 mg/dL (Using min of 1 mg/dL) at 7/24/2021  6:18 AM  Serum Sodium: 140 mmol/L (Using max of 137 mmol/L) at 7/24/2021  6:18 AM  Total Bilirubin: 9.3 mg/dL at 7/24/2021  6:18 AM  INR(ratio): 2.16 at 7/23/2021  5:46 PM  Age: 30 years  -GI consult, appreciate recs   -- Starting transplant workup, consult transplant surgery and SW as a part of this evaluation  -- 2D ECHO - OSH TTE did not comment on PA pressures  -- Monitor neurological status closely, contact GI fellow if any concern for worsening mental status  -- Daily CMP, CBC, INR, phosphorus and factor 5   -- Frequent glucose checks given she is at risk for hypoglycemia. Was on D5 gtt at OSH  -- Panculture and rule out infection with leukocytosis  -- HSV  PCR pending  -- phosphorus replacement protocol  -- Start Prednisone 30 mg from tomorrow     #Right arm swelling   Notes some right arm swelling.  Had an IV in the arm previously  - RUE ultrasound     # SIRS  Met SIRS criteria on presentation Concern for infection on presentation to [facility] and treated with vanc and cefepime.  She had no associated fever or other symptoms. Her cultures have been no growth to date. She did have a mild leukocytosis however was also on steroids at the time.  Overall she is very well appearing without fevers or infectious symptoms.     - Will hold on further antibiotics at this time      #Hypoglycemia  Recent episodes of hypoglycemia while admitted at [facility].  This is concerning for worsening hepatic function, however may also be related to multiple periods of NPO status.  Has been normoglycemic since admission.  - glucose checks PRN for symptoms of hypoglycemia       Diet: Regular Diet Adult    DVT Prophylaxis: Pneumatic Compression Devices  Oro Catheter: Not present  Fluids: PO ad ind[place]   Central Lines: None  Code Status: Full Code      Disposition Plan   Expected discharge:  recommended to prior living arrangement once transplant work up completed.     The patient's care was discussed with the Attending Physician, Dr. Schilling and Patient.    Eladia Kimbrough MD  [address]  Securely message with the Vocera Web Console (learn more here)  Text page via [employer] Paging/Directory  Please see sign in/sign out for up to date coverage information    Risk Factors Present on Admission             # Coagulation Defect: INR = 2.16 (Ref range: 0.85 - 1.15) and/or PTT = 36 Seconds (Ref range: 22 - 38 Seconds) on admission, will monitor for bleeding  # Thrombocytopenia: Plts = 116 10e3/uL (Ref range: 150 - 450 10e3/uL) on admission, will monitor for bleeding       ______________________________________________________________________    Interval History   No acute events overnight, nursing notes review. Afebrile and vitally stable.  Denies pain and overall feels well.  Notes that mostly she misses her daughter and is hopeful that she will be able to see her.    Data reviewed today: I reviewed all medications, new labs and imaging results over the last 24 hours. I personally reviewed no images or EKG's today.    Physical Exam   Vital Signs: Temp: (!) 96.7  F (35.9  C) Temp src: Oral BP: (!) 142/82 Pulse: 83   Resp: 16 SpO2: 98 % O2 Device: None (Room air)    Weight: 202 lbs 6.4 oz     General : Well appearing sitting in bed in no acute distress, pleasant and conversant  HEENT: Scleral icterus, no conjunctival injection, MMM  Chest: Breathing comfortably on room air, lungs clear to auscultation bilaterally , no wheezes or crackles   Heart:  RRR, no murmurs  Abdomen: soft, non-tender, non-distended, no fluid wave  Neuro: Alert and oriented   Ext: right arm with some swelling and mild discomfort, no significant redness, small ecchymosis from prior IV site, no gross MSK defects   Skin: comedones on back and shoulders   Psych: appropriate mood and affect, intermittently tearful      Data   Recent Labs   Lab 07/24/21  1208 07/24/21  0618 07/23/21  2210 07/23/21  1747 07/23/21  1746   WBC  --   --   --   --  12.0*   HGB  --   --   --   --  13.2   MCV  --   --   --   --  88   PLT  --   --   --   --  116*   INR  --  2.05*  --   --  2.16*   NA  --  140  --  138  --    POTASSIUM  --  3.8  --  3.8  --    CHLORIDE  --  109  --  108  --    CO2  --  27  --  27  --    BUN  --  10  --  12  --    CR  --  0.71  --  0.66  --    ANIONGAP  --  4  --  3  --    KENDY  --  8.0*  --  8.2*  --    * 98 104* 170*  --    ALBUMIN  --  2.0*  2.0*  --  2.3*  --    PROTTOTAL  --  4.8*  --  5.2*  --    BILITOTAL  --  9.3*  --  11.6*  --    ALKPHOS  --  178*  --  161*  --    ALT  --  144*  --  159*  --     AST  --  138*  --  117*  --      Recent Results (from the past 24 hour(s))   XR Chest 2 Views    Narrative    EXAM: XR CHEST 2 VW  7/23/2021 6:19 PM     HISTORY:  LT eval       COMPARISON:  Chest abdomen pelvis CT 7/3/2021    FINDINGS: PA and lateral radiographs of the chest. The  cardiomediastinal silhouette is within normal limits. No pleural  effusion or pneumothorax. No focal airspace opacity. The visualized  upper abdomen is unremarkable. No acute osseous abnormality.  Interposition of bowel beneath the right hemidiaphragm.      Impression    IMPRESSION: No focal airspace disease.    I have personally reviewed the examination and initial interpretation  and I agree with the findings.    ALESSANDRO URIAS MD         SYSTEM ID:  [id number]     Medications       predniSONE  30 mg Oral Daily     sodium chloride (PF)  3 mL Intracatheter Q8H

## 2021-07-24 NOTE — PLAN OF CARE
Pt is not appropriate for skilled OT services at this time due to IND with I/ADLs & mobility. Will defer at this time. Plan was discussed with PT.  Will D/C current OT orders. Thank you.    7/24/2021 by Katrina Bethea, OT, OTR/L

## 2021-07-25 ENCOUNTER — APPOINTMENT (OUTPATIENT)
Dept: GENERAL RADIOLOGY | Facility: CLINIC | Age: 31
DRG: 442 | End: 2021-07-25
Attending: INTERNAL MEDICINE
Payer: COMMERCIAL

## 2021-07-25 ENCOUNTER — APPOINTMENT (OUTPATIENT)
Dept: CARDIOLOGY | Facility: CLINIC | Age: 31
DRG: 442 | End: 2021-07-25
Attending: STUDENT IN AN ORGANIZED HEALTH CARE EDUCATION/TRAINING PROGRAM
Payer: COMMERCIAL

## 2021-07-25 LAB
AFP L3 MFR SERPL: 19.7 %
AFP SERPL-MCNC: 19 NG/ML
ALBUMIN SERPL-MCNC: 2.1 G/DL (ref 3.4–5)
ALBUMIN UR-MCNC: NEGATIVE MG/DL
ALP SERPL-CCNC: 169 U/L (ref 40–150)
ALT SERPL W P-5'-P-CCNC: 154 U/L (ref 0–50)
AMMONIA PLAS-SCNC: <10 UMOL/L (ref 10–50)
ANION GAP SERPL CALCULATED.3IONS-SCNC: 5 MMOL/L (ref 3–14)
APPEARANCE UR: ABNORMAL
AST SERPL W P-5'-P-CCNC: 165 U/L (ref 0–45)
BACTERIA #/AREA URNS HPF: ABNORMAL /HPF
BILIRUB DIRECT SERPL-MCNC: 7.2 MG/DL (ref 0–0.2)
BILIRUB SERPL-MCNC: 9 MG/DL (ref 0.2–1.3)
BILIRUB UR QL STRIP: ABNORMAL
BUN SERPL-MCNC: 10 MG/DL (ref 7–30)
CALCIUM SERPL-MCNC: 7.8 MG/DL (ref 8.5–10.1)
CAOX CRY #/AREA URNS HPF: ABNORMAL /HPF
CHLORIDE BLD-SCNC: 110 MMOL/L (ref 94–109)
CO2 SERPL-SCNC: 26 MMOL/L (ref 20–32)
COLOR UR AUTO: ABNORMAL
CREAT SERPL-MCNC: 0.69 MG/DL (ref 0.52–1.04)
CRP SERPL-MCNC: 5.7 MG/L (ref 0–8)
ERYTHROCYTE [DISTWIDTH] IN BLOOD BY AUTOMATED COUNT: 21.6 % (ref 10–15)
FACT V ACT/NOR PPP: 35 % (ref 60–140)
GFR SERPL CREATININE-BSD FRML MDRD: >90 ML/MIN/1.73M2
GLUCOSE BLD-MCNC: 59 MG/DL (ref 70–99)
GLUCOSE BLDC GLUCOMTR-MCNC: 109 MG/DL (ref 70–99)
GLUCOSE BLDC GLUCOMTR-MCNC: 123 MG/DL (ref 70–99)
GLUCOSE BLDC GLUCOMTR-MCNC: 169 MG/DL (ref 70–99)
GLUCOSE UR STRIP-MCNC: NEGATIVE MG/DL
HCT VFR BLD AUTO: 42.1 % (ref 35–47)
HGB BLD-MCNC: 14.1 G/DL (ref 11.7–15.7)
HGB UR QL STRIP: ABNORMAL
INR PPP: 2 (ref 0.85–1.15)
KETONES UR STRIP-MCNC: NEGATIVE MG/DL
LACTATE SERPL-SCNC: 1.5 MMOL/L (ref 0.7–2)
LEUKOCYTE ESTERASE UR QL STRIP: NEGATIVE
LVEF ECHO: NORMAL
MCH RBC QN AUTO: 29.1 PG (ref 26.5–33)
MCHC RBC AUTO-ENTMCNC: 33.5 G/DL (ref 31.5–36.5)
MCV RBC AUTO: 87 FL (ref 78–100)
MUCOUS THREADS #/AREA URNS LPF: PRESENT /LPF
NITRATE UR QL: NEGATIVE
PH UR STRIP: 6.5 [PH] (ref 5–7)
PHOSPHATE SERPL-MCNC: 4 MG/DL (ref 2.5–4.5)
PLATELET # BLD AUTO: 116 10E3/UL (ref 150–450)
POTASSIUM BLD-SCNC: 3.6 MMOL/L (ref 3.4–5.3)
PROCALCITONIN SERPL-MCNC: 0.3 NG/ML
PROT SERPL-MCNC: 5 G/DL (ref 6.8–8.8)
RBC # BLD AUTO: 4.84 10E6/UL (ref 3.8–5.2)
RBC URINE: 65 /HPF
SODIUM SERPL-SCNC: 141 MMOL/L (ref 133–144)
SP GR UR STRIP: 1.01 (ref 1–1.03)
SQUAMOUS EPITHELIAL: 1 /HPF
TRANSITIONAL EPI: <1 /HPF
UROBILINOGEN UR STRIP-MCNC: NORMAL MG/DL
WBC # BLD AUTO: 21.7 10E3/UL (ref 4–11)
WBC URINE: 3 /HPF

## 2021-07-25 PROCEDURE — 36415 COLL VENOUS BLD VENIPUNCTURE: CPT | Performed by: INTERNAL MEDICINE

## 2021-07-25 PROCEDURE — 83605 ASSAY OF LACTIC ACID: CPT | Performed by: INTERNAL MEDICINE

## 2021-07-25 PROCEDURE — 81001 URINALYSIS AUTO W/SCOPE: CPT | Performed by: INTERNAL MEDICINE

## 2021-07-25 PROCEDURE — 71046 X-RAY EXAM CHEST 2 VIEWS: CPT

## 2021-07-25 PROCEDURE — 84100 ASSAY OF PHOSPHORUS: CPT | Performed by: STUDENT IN AN ORGANIZED HEALTH CARE EDUCATION/TRAINING PROGRAM

## 2021-07-25 PROCEDURE — 99232 SBSQ HOSP IP/OBS MODERATE 35: CPT | Performed by: STUDENT IN AN ORGANIZED HEALTH CARE EDUCATION/TRAINING PROGRAM

## 2021-07-25 PROCEDURE — 86140 C-REACTIVE PROTEIN: CPT | Performed by: INTERNAL MEDICINE

## 2021-07-25 PROCEDURE — 82140 ASSAY OF AMMONIA: CPT | Performed by: STUDENT IN AN ORGANIZED HEALTH CARE EDUCATION/TRAINING PROGRAM

## 2021-07-25 PROCEDURE — 85610 PROTHROMBIN TIME: CPT | Performed by: STUDENT IN AN ORGANIZED HEALTH CARE EDUCATION/TRAINING PROGRAM

## 2021-07-25 PROCEDURE — 36415 COLL VENOUS BLD VENIPUNCTURE: CPT | Performed by: STUDENT IN AN ORGANIZED HEALTH CARE EDUCATION/TRAINING PROGRAM

## 2021-07-25 PROCEDURE — 80307 DRUG TEST PRSMV CHEM ANLYZR: CPT | Performed by: INTERNAL MEDICINE

## 2021-07-25 PROCEDURE — 85220 BLOOC CLOT FACTOR V TEST: CPT | Performed by: STUDENT IN AN ORGANIZED HEALTH CARE EDUCATION/TRAINING PROGRAM

## 2021-07-25 PROCEDURE — 82248 BILIRUBIN DIRECT: CPT | Performed by: STUDENT IN AN ORGANIZED HEALTH CARE EDUCATION/TRAINING PROGRAM

## 2021-07-25 PROCEDURE — 85027 COMPLETE CBC AUTOMATED: CPT | Performed by: STUDENT IN AN ORGANIZED HEALTH CARE EDUCATION/TRAINING PROGRAM

## 2021-07-25 PROCEDURE — 87040 BLOOD CULTURE FOR BACTERIA: CPT | Performed by: INTERNAL MEDICINE

## 2021-07-25 PROCEDURE — 99233 SBSQ HOSP IP/OBS HIGH 50: CPT | Performed by: INTERNAL MEDICINE

## 2021-07-25 PROCEDURE — 87529 HSV DNA AMP PROBE: CPT | Performed by: STUDENT IN AN ORGANIZED HEALTH CARE EDUCATION/TRAINING PROGRAM

## 2021-07-25 PROCEDURE — 93306 TTE W/DOPPLER COMPLETE: CPT

## 2021-07-25 PROCEDURE — 250N000012 HC RX MED GY IP 250 OP 636 PS 637: Performed by: INTERNAL MEDICINE

## 2021-07-25 PROCEDURE — 93306 TTE W/DOPPLER COMPLETE: CPT | Mod: 26 | Performed by: INTERNAL MEDICINE

## 2021-07-25 PROCEDURE — 84145 PROCALCITONIN (PCT): CPT | Performed by: INTERNAL MEDICINE

## 2021-07-25 PROCEDURE — 120N000002 HC R&B MED SURG/OB UMMC

## 2021-07-25 PROCEDURE — 84702 CHORIONIC GONADOTROPIN TEST: CPT | Performed by: STUDENT IN AN ORGANIZED HEALTH CARE EDUCATION/TRAINING PROGRAM

## 2021-07-25 PROCEDURE — 71046 X-RAY EXAM CHEST 2 VIEWS: CPT | Mod: 26 | Performed by: RADIOLOGY

## 2021-07-25 RX ORDER — PREDNISONE 20 MG/1
40 TABLET ORAL DAILY
Status: DISCONTINUED | OUTPATIENT
Start: 2021-07-25 | End: 2021-07-25

## 2021-07-25 RX ADMIN — PREDNISONE 40 MG: 20 TABLET ORAL at 09:47

## 2021-07-25 ASSESSMENT — ACTIVITIES OF DAILY LIVING (ADL)
ADLS_ACUITY_SCORE: 14

## 2021-07-25 ASSESSMENT — MIFFLIN-ST. JEOR: SCORE: 1639.65

## 2021-07-25 NOTE — PLAN OF CARE
[id number] WBC count elevated today. No fevers; tachy at start of shift endorse elevated HR related to recent lab draw. Sohail pain. Chest xray and echo completed. Denies abdominal pain. BS x2 daily and at bedtime. UA to be collected. A/O x4. Plan of care: transplant work up.

## 2021-07-25 NOTE — PLAN OF CARE
".Assumed cares 1900-0730. Pt rounded on hourly.     .BP (!) 149/77 (BP Location: Left arm)   Pulse (!) 122   Temp 96.8  F (36  C) (Oral)   Resp 18   Ht 1.626 m (5' 4.02\")   Wt 93 kg (205 lb)   SpO2 99%   BMI 35.17 kg/m      Pain: denies  Neuro: Aox4, able to make needs known.  Resp: WDL on RA- denies SOB  Cardiac: WDL- denies chest pains  Skin: intact  GI/: pt voiding spontaneously and adequately  Nutrition: regular diet  Activity: independent  Access: PIV saline locked    Events: pt slept throughout the night.    Plan: ECHO to be done today. Continue to follow plan of care and notify MD with changes in condition.     "

## 2021-07-25 NOTE — PROGRESS NOTES
"[facility]    Progress Note - Radha 5 Service        Date of Admission:  7/23/2021    Assessment & Plan      Yaritza Dias is a 30 year old female admitted for evaluation for of acute liver failure. She was recently diagnosed with autoimmune hepatitis at [facility]--per pathology likely vaccine related--and transferred to [facility] on 7/23 for transplant evaluation given worsening of synthetic function.     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  Work up including MRI and liver biopsy completed at [facility].  MRI showing multiple liver masses. ROLANDO positive at 1:640 F-actin negative. Liver biopsy 7/6 showed severe acute hepatitis with hepatocyte loss, no evidence of neoplastic process. 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 HIV negative, Did have some improvement in transaminases, however continued to have a climbing INR, therefore transferred to the [facility] on 7/23 for further work up/ transplant evaluation   MELD-Na score: 22 at 7/25/2021  6:04 AM  MELD score: 22 at 7/25/2021  6:04 AM  Calculated from:  Serum Creatinine: 0.69 mg/dL (Using min of 1 mg/dL) at 7/25/2021  6:04 AM  Serum Sodium: 141 mmol/L (Using max of 137 mmol/L) at 7/25/2021  6:04 AM  Total Bilirubin: 9.0 mg/dL at 7/25/2021  6:04 AM  INR(ratio): 2.00 at 7/25/2021  6:04 AM  Age: 30 years  -GI consult, appreciate recs   -- Starting transplant workup, consult transplant surgery and SW as a part of this evaluation  -- 2D ECHO - OSH TTE did not comment on PA pressures  -- Monitor neurological status closely, contact GI fellow if any concern for worsening mental status  -- Daily CMP, CBC, INR, phosphorus and " factor 5   -- Frequent glucose checks given she is at risk for hypoglycemia. Was on D5 gtt at OSH  -- HSV PCR pending  -- phosphorus replacement protocol  -- Prednisone 30 mg  - VAERS ([facility] Vaccine Adverse Events Reporting System) report filed by Dr. Schilling 7/24. Case No: [id number].     # SIRS  Met SIRS criteria on presentation to [facility] and treated with vanc and cefepime.  She had no associated fever or other symptoms. Her cultures have been no growth to date. She did have a mild leukocytosis however was also on steroids at the time. WBC again yadira 7/25 (12-->21.7) despite no increase in steroid dose. Continues to deny any infectious symptoms.  No ascites noted on 7/25 bedside ultrasound. No dysuria. No respiratory symptoms.   - Will hold on further antibiotics at this time    - Check CRP, Procal, UA, CXR     #Right arm superficial vein thrombosis  Noted some right arm swelling 7/24  Had an IV in the arm previously. RUE Ultrasound showed extensive R basilic vein (superficial vein) thrombosis from mid forearm to axilla.   - no need for anticoagulation. Continue to monitor    #Hypoglycemia  Recent episodes of hypoglycemia while admitted at [facility].  This is concerning for worsening hepatic function, however may also be related to multiple periods of NPO status. Hypoglycemic to 59 on am labs--asymptomatic  - glucose checks BID and HS       Diet: Regular Diet Adult    DVT Prophylaxis: Pneumatic Compression Devices  Oro Catheter: Not present  Fluids: PO ad [place]   Central Lines: None  Code Status: Full Code      Disposition Plan   Expected discharge:  recommended to prior living arrangement once transplant work up completed.     The patient's care was discussed with the Bedside Nurse and Patient.    Ruiz Schilling MD  [address]  Securely message with the Vocera Web Console (learn more here)  Text page via [employer] Paging/Directory  Please see sign in/sign  out for up to date coverage information    ___________________________________________________________    Interval History   No acute events overnight, nursing notes review. Afebrile and vitally stable. Was quite anxious overnight due to rise in INR from 2.05 - 2.24. Relieved that it is trending back down today.  Denies pain and overall feels well. Denies CP, SOB, fever, cough, dysuria, abdominal pain.  Is feeling homesick.     Data reviewed today: I reviewed all medications, new labs and imaging results over the last 24 hours. I personally reviewed no images or EKG's today.    Physical Exam   Vital Signs: Temp: 96.8  F (36  C) Temp src: Oral BP: (!) 149/77 Pulse: (!) 122   Resp: 18 SpO2: 99 % O2 Device: None (Room air)    Weight: 205 lbs 0 oz     General : Well appearing sitting in bed in no acute distress, pleasant and conversant  HEENT: Scleral icterus, no conjunctival injection, MMM  Chest: Breathing comfortably on room air, lungs clear to auscultation bilaterally , no wheezes or crackles   Heart:  RRR, no murmurs  Abdomen: soft, non-tender, non-distended. POCUS without any ascites.  Neuro: Alert and oriented   Ext: right arm with some swelling and mild discomfort, no significant redness, small ecchymosis from prior IV site, no gross MSK defects   Psych: appropriate mood and affect     Data   Recent Labs   Lab 07/25/21  0604 07/24/21  1813 07/24/21  1208 07/24/21  0618 07/23/21  1747 07/23/21  1746   WBC 21.7*  --   --   --   --  12.0*   HGB 14.1  --   --   --   --  13.2   MCV 87  --   --   --   --  88   *  --   --   --   --  116*   INR 2.00* 2.24*  --  2.05*  --  2.16*     --   --  140 138  --    POTASSIUM 3.6  --   --  3.8 3.8  --    CHLORIDE 110*  --   --  109 108  --    CO2 26  --   --  27 27  --    BUN 10  --   --  10 12  --    CR 0.69  --   --  0.71 0.66  --    ANIONGAP 5  --   --  4 3  --    KENDY 7.8*  --   --  8.0* 8.2*  --    GLC 59*  --  125* 98 170*  --    ALBUMIN 2.1*  --   --  2.0*   2.0* 2.3*  --    PROTTOTAL 5.0*  --   --  4.8* 5.2*  --    BILITOTAL 9.0*  --   --  9.3* 11.6*  --    ALKPHOS 169*  --   --  178* 161*  --    *  --   --  144* 159*  --    *  --   --  138* 117*  --      Recent Results (from the past 24 hour(s))   US Upper Extremity Venous Duplex Right    Narrative    EXAMINATION: DOPPLER VENOUS ULTRASOUND OF THE RIGHT UPPER EXTREMITY,  7/24/2021 3:47 PM     COMPARISON: None.    HISTORY: Right arm swelling    TECHNIQUE:  Gray-scale evaluation with compression, spectral flow and  color Doppler assessment of the deep venous system of the right upper  extremity.    FINDINGS:  Right: Normal blood flow and waveforms are demonstrated in the  internal jugular, innominate, subclavian, and axillary veins. There is  normal compressibility of the brachial and cephalic veins.      Impression    IMPRESSION:  1.  No evidence of right upper extremity deep venous thrombosis.  2.  Occlusive superficial thrombus in right basilic vein from upper  forearm to axilla.    I have personally reviewed the examination and initial interpretation  and I agree with the findings.    ALESSANDRO URIAS MD         SYSTEM ID:  [id number]     Medications       predniSONE  30 mg Oral Daily     sodium chloride (PF)  3 mL Intracatheter Q8H

## 2021-07-26 ENCOUNTER — TELEPHONE (OUTPATIENT)
Dept: GASTROENTEROLOGY | Facility: CLINIC | Age: 31
End: 2021-07-26

## 2021-07-26 ENCOUNTER — MYC MEDICAL ADVICE (OUTPATIENT)
Dept: GASTROENTEROLOGY | Facility: CLINIC | Age: 31
End: 2021-07-26

## 2021-07-26 VITALS
OXYGEN SATURATION: 96 % | TEMPERATURE: 97.7 F | HEART RATE: 94 BPM | BODY MASS INDEX: 35.36 KG/M2 | DIASTOLIC BLOOD PRESSURE: 86 MMHG | SYSTOLIC BLOOD PRESSURE: 128 MMHG | WEIGHT: 207.1 LBS | RESPIRATION RATE: 18 BRPM | HEIGHT: 64 IN

## 2021-07-26 DIAGNOSIS — K75.4 AUTOIMMUNE HEPATITIS (H): Primary | ICD-10-CM

## 2021-07-26 LAB
ABO/RH(D): NORMAL
ALBUMIN SERPL-MCNC: 2 G/DL (ref 3.4–5)
ALP SERPL-CCNC: 156 U/L (ref 40–150)
ALT SERPL W P-5'-P-CCNC: 131 U/L (ref 0–50)
AMMONIA PLAS-SCNC: <10 UMOL/L (ref 10–50)
ANION GAP SERPL CALCULATED.3IONS-SCNC: 5 MMOL/L (ref 3–14)
ANTIBODY SCREEN: NEGATIVE
AST SERPL W P-5'-P-CCNC: 140 U/L (ref 0–45)
BILIRUB DIRECT SERPL-MCNC: 6.1 MG/DL (ref 0–0.2)
BILIRUB SERPL-MCNC: 8.2 MG/DL (ref 0.2–1.3)
BUN SERPL-MCNC: 11 MG/DL (ref 7–30)
CALCIUM SERPL-MCNC: 7.9 MG/DL (ref 8.5–10.1)
CHLORIDE BLD-SCNC: 109 MMOL/L (ref 94–109)
CO2 SERPL-SCNC: 26 MMOL/L (ref 20–32)
CREAT SERPL-MCNC: 0.65 MG/DL (ref 0.52–1.04)
ERYTHROCYTE [DISTWIDTH] IN BLOOD BY AUTOMATED COUNT: 21.2 % (ref 10–15)
FACT V ACT/NOR PPP: 34 % (ref 60–140)
GFR SERPL CREATININE-BSD FRML MDRD: >90 ML/MIN/1.73M2
GLUCOSE BLD-MCNC: 79 MG/DL (ref 70–99)
HAV IGG SER QL IA: NONREACTIVE
HCT VFR BLD AUTO: 38.6 % (ref 35–47)
HGB BLD-MCNC: 12.7 G/DL (ref 11.7–15.7)
HIV 1+2 AB+HIV1 P24 AG SERPL QL IA: NONREACTIVE
HOLD SPECIMEN: NORMAL
HSV1 DNA SPEC QL NAA+PROBE: NEGATIVE
HSV2 DNA SPEC QL NAA+PROBE: NEGATIVE
INR PPP: 2.02 (ref 0.85–1.15)
MCH RBC QN AUTO: 28.9 PG (ref 26.5–33)
MCHC RBC AUTO-ENTMCNC: 32.9 G/DL (ref 31.5–36.5)
MCV RBC AUTO: 88 FL (ref 78–100)
PHOSPHATE SERPL-MCNC: 3.6 MG/DL (ref 2.5–4.5)
PLATELET # BLD AUTO: 113 10E3/UL (ref 150–450)
POTASSIUM BLD-SCNC: 4 MMOL/L (ref 3.4–5.3)
PROT SERPL-MCNC: 4.6 G/DL (ref 6.8–8.8)
RBC # BLD AUTO: 4.39 10E6/UL (ref 3.8–5.2)
SODIUM SERPL-SCNC: 140 MMOL/L (ref 133–144)
SPECIMEN EXPIRATION DATE: NORMAL
WBC # BLD AUTO: 19.4 10E3/UL (ref 4–11)

## 2021-07-26 PROCEDURE — 36415 COLL VENOUS BLD VENIPUNCTURE: CPT | Performed by: INTERNAL MEDICINE

## 2021-07-26 PROCEDURE — 85610 PROTHROMBIN TIME: CPT | Performed by: INTERNAL MEDICINE

## 2021-07-26 PROCEDURE — 82248 BILIRUBIN DIRECT: CPT | Performed by: STUDENT IN AN ORGANIZED HEALTH CARE EDUCATION/TRAINING PROGRAM

## 2021-07-26 PROCEDURE — 36415 COLL VENOUS BLD VENIPUNCTURE: CPT | Performed by: STUDENT IN AN ORGANIZED HEALTH CARE EDUCATION/TRAINING PROGRAM

## 2021-07-26 PROCEDURE — 84100 ASSAY OF PHOSPHORUS: CPT | Performed by: STUDENT IN AN ORGANIZED HEALTH CARE EDUCATION/TRAINING PROGRAM

## 2021-07-26 PROCEDURE — 85027 COMPLETE CBC AUTOMATED: CPT | Performed by: STUDENT IN AN ORGANIZED HEALTH CARE EDUCATION/TRAINING PROGRAM

## 2021-07-26 PROCEDURE — 250N000012 HC RX MED GY IP 250 OP 636 PS 637: Performed by: INTERNAL MEDICINE

## 2021-07-26 PROCEDURE — 85220 BLOOC CLOT FACTOR V TEST: CPT | Performed by: STUDENT IN AN ORGANIZED HEALTH CARE EDUCATION/TRAINING PROGRAM

## 2021-07-26 PROCEDURE — 82140 ASSAY OF AMMONIA: CPT | Performed by: STUDENT IN AN ORGANIZED HEALTH CARE EDUCATION/TRAINING PROGRAM

## 2021-07-26 PROCEDURE — 99221 1ST HOSP IP/OBS SF/LOW 40: CPT | Mod: GC | Performed by: SURGERY

## 2021-07-26 PROCEDURE — 86481 TB AG RESPONSE T-CELL SUSP: CPT | Performed by: STUDENT IN AN ORGANIZED HEALTH CARE EDUCATION/TRAINING PROGRAM

## 2021-07-26 PROCEDURE — 86900 BLOOD TYPING SEROLOGIC ABO: CPT | Performed by: STUDENT IN AN ORGANIZED HEALTH CARE EDUCATION/TRAINING PROGRAM

## 2021-07-26 PROCEDURE — 99238 HOSP IP/OBS DSCHRG MGMT 30/<: CPT | Mod: GC | Performed by: INTERNAL MEDICINE

## 2021-07-26 RX ORDER — PREDNISONE 5 MG/1
30 TABLET ORAL DAILY
Start: 2021-07-26 | End: 2021-09-22

## 2021-07-26 RX ADMIN — PREDNISONE 30 MG: 20 TABLET ORAL at 07:57

## 2021-07-26 ASSESSMENT — ACTIVITIES OF DAILY LIVING (ADL)
ADLS_ACUITY_SCORE: 14

## 2021-07-26 NOTE — CONSULTS
[facility] Transplant Surgery History and Physical    Yaritza Dias MRN# [id number]   Age: 30 year old YOB: 1990     Date of Admission:  7/23/2021            Assessment and Plan:   Assessment:  Ms. Dias is a 30 years old female with past medical history of AIH who had an acute worsening liver function. Her recent MRI abdomen shows multiple nonspecific liver masses. Recent bx showing severe hepatitis (grade 4/4) without any fibrosis. Currently, patient is clinically stable without any signs and symptoms of decompensated liver failure. Patient's labs are currently improving.     Plan:  - No acute indication for emergent listing  - Agree with hepatology for medical management of her liver disease   - She needs a close follow up in order to evaluate for any chronic changes or decompensation    - She will be discussed at Liver Transplant Board.         Chief Complaint:   30 years old female with past medical history of autoimmune hepatitis who was transferred to [facility] for further evaluation of worsening acute liver function.   Patient was found to be jaundiced in July 2021. She was noted to have transaminitis, coagulopathy. She had imaging that showed liver masses and splenomegaly. She underwent a liver bx showing severe necrosis. She was treated with Prednisone 40 for presumed AIH.   Of note, she recently delivered her a baby in January 2021. She was noted have some elevated LFTs at the time.   Currently, she denies any nausea, vomiting. She denies any fever, chills. She denies any abdominal pain. She denies any worsening jaundice. She does not show any signs of confusion. Her currently Tbili 9, AST//165. INR 2. Factor V 35.         Past Medical History:     Past Medical History:   Diagnosis Date     Gestational diabetes             Past Surgical History:   History reviewed. No pertinent surgical history.         Social History:     Social History     Tobacco Use      Smoking status: Never Smoker     Smokeless tobacco: Never Used   Substance Use Topics     Alcohol use: Not on file     Comment: very rare social drinker            Family History:     Family History   Problem Relation Age of Onset     Celiac Disease Sister      Family history reviewed and updated in EPIC         Immunizations:   Immunizations are up to date         Allergies:   All allergies reviewed and addressed         Medications:     Current Facility-Administered Medications   Medication     lidocaine (LMX4) cream     lidocaine 1 % 0.1-1 mL     melatonin tablet 1 mg     predniSONE (DELTASONE) tablet 30 mg     sodium chloride (PF) 0.9% PF flush 3 mL     sodium chloride (PF) 0.9% PF flush 3 mL             Review of Systems:   The Review of Systems is negative other than noted in the HPI    Gen: sitting in bed comfortably   HEENT: no icteric   CV: regular rate   Pulm: normal respiratory effort   Abd: soft, non-distended, non-tender, no previous surgical scar        Tyree Mary Allen MD

## 2021-07-26 NOTE — TELEPHONE ENCOUNTER
Standing lab orders faxed to [facility] in [place]. Pt updated via [email or web address] message.    Shana TIWARI LPN  Hepatology Clinic

## 2021-07-26 NOTE — PROGRESS NOTES
Patient discharged to home with  at 1615 ambulatory to front door. All belongings packed by patient and sent. PIV removed and discharge paperwork along with medications reviewed with patient. Patient verbalized understanding and had no further questions. Patient to follow-up with follow-up appts.

## 2021-07-26 NOTE — PROGRESS NOTES
Brief Hepatology Note:  30-year-old female with no significant past medical history who had recent diagnosis of autoimmune hepatitis.  Patient had severe autoimmune hepatitis with necrosis potentially related to her Covid vaccination.  She has a history of mild AST/ALT elevation in the past, and is post partum which also could increase her risk of AI Hepatitis. she has been started on steroids with good response upon her transaminases, bilirubin and INR are slightly lagging behind.  Patient was discussed in the tumor conference with a consensus that the imaging findings are consistent with area of necrosis and hepatitis.  Transferred here for liver transplant evaluation out of concern for worsening liver function. Has been stable since admission.    Prednisone 30 mg today.  Clinically stable.    --Continue prednisone 30mg upon discharge  --Repeat labs in 3 days - ordered by Dr. Borden to be done at [facility]  --Prednisone taper will be based upon outpatient labs  --Discussed s/s for which she should seek care  --Follow-up with Dr. Rich in liver clinic      Case d/w Dr. Borden.    Karen Paris  Hepatology Fellow

## 2021-07-26 NOTE — PROGRESS NOTES
"GI Follow Up Note    S/IE: Patient doing well. HR elevated this AM - reports feeling overwhelmed and anxious at times. Otherwise feels well without infectious s/s    O:  Vital signs:  Temp: 97.5  F (36.4  C) Temp src: Oral BP: 130/84 Pulse: 86   Resp: 18 SpO2: 97 % O2 Device: None (Room air)   Height: 162.6 cm (5' 4.02\") Weight: 93.9 kg (207 lb 1.6 oz)  Estimated body mass index is 35.53 kg/m  as calculated from the following:    Height as of this encounter: 1.626 m (5' 4.02\").    Weight as of this encounter: 93.9 kg (207 lb 1.6 oz).  General: Pleasant woman in NAD  HEENT: Icteric  Neuro: AAx03    A/P: 30-year-old female with no significant past medical history, who had recent diagnosis of autoimmune hepatitis.    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 Has a history of mild AST/ALT elevation in the past, may have been predisposed to AI hepatitis, which can also flare after childbirth.     Patient had been started on steroids, AST/ALT improved but lagging BR and INR was slowly rising as an outpatient.  She does not has encephalopathy which goes against acute liver failure.  But given the fact that she had severe necrosis upon her liver biopsy, concern is that she is at risk for progression to liver failure requiring transplant. If her liver regenerates she will likely have scarring in her areas of necrosis.  Her case was discussed in tumor conference, imaging findings not felt to be c/w malignancy - felt to represent area of necrosis and hepatitis.     Recommend close monitoring for worsening liver function and encephalopathy, starting liver transplant evaluation.      Her phosphorus should be checked daily as the severe necrosis in the liver will start regeneration consuming phosphorus and rendering her levels to decrease.    BR slightly downtrending over the weekend.     Recommendations  - Decrease prednisone to 30 mg 7/26 given improvement in LFTs  - Daily CBC, LFTS, BMP, " INR  - Check phos daily and supplement  - Ongoing transplant evaluation in process given risk of decompensation    Ashlee Borden MD  Transplant Hepatology

## 2021-07-26 NOTE — PLAN OF CARE
".Assumed cares [id number]. Pt rounded on hourly.     ./78 (BP Location: Left arm)   Pulse 87   Temp (!) 96.4  F (35.8  C) (Oral)   Resp 16   Ht 1.626 m (5' 4.02\")   Wt 93.9 kg (207 lb 1.6 oz)   SpO2 98%   BMI 35.53 kg/m       Pain: denies  Neuro: Aox4, able to make needs known.   Resp: WDL on RA- denies SOB  Cardiac: WDL- denies chest pains.   Skin: intact  GI/: voiding adequately and spontaneously   Nutrition:regular diet  Activity: independent  Access: PIV saline locked    Events: pt sleeping in between cares. BG at bedtime 123, offered apple juice in the middle of the night to prevent another episode of hypoglycemia in the AM.     Plan: continue with labs. continue to follow plan of care and notify MD with changes in condition.     "

## 2021-07-26 NOTE — TELEPHONE ENCOUNTER
[facility] Call Center    Phone Message    May a detailed message be left on voicemail: yes     Reason for Call: Order(s): Other:   Reason for requested: Labs  Date needed: asap   Provider name: Dr. Michi Vigil returning a call from clinic to schedule labs. Patient wondering if labs can be completed at [facility] in [place].     Please advise and call Yaritza back once orders have been sent     Action Taken: Other:  HEPATOLOGY    Travel Screening: Not Applicable

## 2021-07-26 NOTE — DISCHARGE SUMMARY
[facility]  Discharge Summary - Medicine & Pediatrics       Date of Admission:  7/23/2021  Date of Discharge:  7/26/2021  Discharging Provider: Dr. Ruiz Schilling  Discharge Service: Radha Izaguirre    Discharge Diagnoses   Acute liver failure  Autoimmune hepatitis  Hypoglycemia      Follow-ups Needed After Discharge   Follow-up Appointments     Adult [facility] Follow-up and recommended labs and tests      Follow up with Dr. Moy , at [facility] Transplant hepatology, follow up   to be set up by transplant coordinator. The following labs/tests are   recommended: lab monitoring on Mondays and Thursdays per hepatology.    Appointments on [place] and/or [facility] (with [facility] or [facility]   provider or service). Call [phone] if you haven't heard regarding   these appointments within 7 days of discharge.             Unresulted Labs Ordered in the Past 30 Days of this Admission     Date and Time Order Name Status Description    7/26/2021  2:25 PM Quantiferon TB Gold Plus Purple Tube In process     7/26/2021  2:25 PM Quantiferon TB Gold Plus Yellow Tube In process     7/26/2021  2:25 PM Quantiferon TB Gold Plus Green Tube In process     7/26/2021  2:25 PM Quantiferon TB Gold Plus Grey Tube In process     7/26/2021  1:23 PM HCG tumor marker In process     7/25/2021  1:03 PM Blood Culture Arm, Left Preliminary     7/25/2021  1:02 PM Blood Culture Hand, Left Preliminary     7/23/2021  5:02 PM Phosphatidylethanol (PEth) In process     7/23/2021  5:02 PM Ethyl Glucuronide Urine In process     7/23/2021  5:02 PM CMV Antibody IgG In process     7/23/2021  5:02 PM EBV Capsid Antibody IgG In process     7/23/2021  5:02 PM Hepatitis B Surface Antibody In process       These results will be followed up by hepatology    Discharge Disposition   Discharged to home  Condition at discharge: Stable    Hospital Course   Yaritza Dias was admitted on 7/23/2021 for acute liver failure and liver  "transplant evaluation.  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  Work up including MRI and liver biopsy completed at [facility].  MRI showing multiple liver masses. ROLANDO positive at 1:640 F-actin negative. Abdominal ultrasound with doppler with patent vasculature. Liver biopsy 7/6 showed severe acute hepatitis with hepatocyte loss, no evidence of neoplastic process. 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 HIV negative, Did have some improvement in transaminases, however continued to have a climbing INR, therefore transferred to the [facility] on 7/23 for further work up/ transplant evaluation. During this hospitalization, she was evaluated by transplant hepatology and surgery, with no acute indication for emergent transplant listing. Her prednisone was decreased to 30 mg daily, she will discharge on this dose. Laboratory monitoring showed improvement in her transaminases, stabilization of her synthetic liver function, and resolution of hypoglycemia. At the time of discharge patient was feeling well and tolerating oral diet. Follow up to be scheduled with hepatology, with plan for twice weekly lab monitoring (Monday, Thursday). Prednisone will be tapered by hepatology based on laboratory monitoring.    Consultations This Hospital Stay   GI HEPATOLOGY ADULT IP CONSULT  SOCIAL WORK IP CONSULT  NUTRITION SERVICES ADULT IP CONSULT  PHYSICAL THERAPY ADULT IP CONSULT  OCCUPATIONAL THERAPY ADULT IP CONSULT  TRANSPLANT SURGERY LIVER ADULT IP CONSULT  CARE MANAGEMENT / SOCIAL WORK IP CONSULT    Code Status   Full Code       The patient was discussed with Dr." MD Radha Hare 5 Service  [facility] [address] [address]  Phone: [phone]  ______________________________________________________________________    Physical Exam   Vital Signs: Temp: 97.7  F (36.5  C) Temp src: Oral BP: 128/86 Pulse: 94   Resp: 18 SpO2: 96 % O2 Device: None (Room air)    Weight: 207 lbs 1.6 oz  General : Well appearing sitting in bed in no acute distress, pleasant and conversant  HEENT: Scleral icterus, no conjunctival injection, MMM  Chest: Breathing comfortably on room air, lungs clear to auscultation bilaterally , no wheezes or crackles   Heart:  RRR, no murmurs  Abdomen: soft, non-tender, non-distended. POCUS without any ascites.  Neuro: Alert and oriented   Ext: right arm with some swelling and mild discomfort, no significant redness, small ecchymosis from prior IV site, no gross MSK defects   Psych: appropriate mood and affect      Primary Care Physician   Laureano Douglas    Discharge Orders      Reason for your hospital stay    Dear Yaritza Dias    Your were hospitalized at [facility] with acute liver failure.  Over your hospitalization your labs continued to be stable and today you are ready to be discharged to home.      We are suggesting the following medication changes:  Continue taking prednisone 30 mg daily, the liver doctor will contact you after your labs on Thursday to discuss possible dose change    Please get the following tests done:  Liver monitoring labs every Monday and Thursday, please present to any [facility] lab for lab draw    Please set up an appointment with:  Hepatology clinic    It was a pleasure meeting with you today. Thank you for allowing me and my team the privilege of caring for you today. You are the reason we are here, and I truly hope we provided you with the excellent service you deserve. Please let us know if there is anything else we can do for you so that  we can be sure you are leaving completely satisfied with your care experience.    Your hospital unit at the time of discharge is 5B so if you have any questions please call the hospital at [phone] and ask to talk to a nurse on 5B.    Be well,     ULISES Garcia MD  Internal Medicine-Pediatrics, MP-4     Activity    Your activity upon discharge: activity as tolerated     Adult [facility] Follow-up and recommended labs and tests    Follow up with Dr. Moy , at [facility] Transplant hepatology, follow up to be set up by transplant coordinator. The following labs/tests are recommended: lab monitoring on Mondays and Thursdays per hepatology.    Appointments on [place] and/or [facility] (with [facility] or [facility] provider or service). Call [phone] if you haven't heard regarding these appointments within 7 days of discharge.     Diet    Follow this diet upon discharge: Orders Placed This Encounter      Regular Diet Adult       Significant Results and Procedures   Most Recent 3 CBC's:Recent Labs   Lab Test 07/26/21  0555 07/25/21  0604 07/23/21  1746   WBC 19.4* 21.7* 12.0*   HGB 12.7 14.1 13.2   MCV 88 87 88   * 116* 116*     Most Recent 3 BMP's:Recent Labs   Lab Test 07/26/21  0555 07/25/21  2144 07/25/21  1801 07/25/21  0604 07/24/21  0618     --   --  141 140   POTASSIUM 4.0  --   --  3.6 3.8   CHLORIDE 109  --   --  110* 109   CO2 26  --   --  26 27   BUN 11  --   --  10 10   CR 0.65  --   --  0.69 0.71   ANIONGAP 5  --   --  5 4   KENDY 7.9*  --   --  7.8* 8.0*   GLC 79 123* 169* 59* 98     Most Recent 2 LFT's:Recent Labs   Lab Test 07/26/21  0555 07/25/21  0604   * 165*   * 154*   ALKPHOS 156* 169*   BILITOTAL 8.2* 9.0*     Most Recent 3 INR's:Recent Labs   Lab Test 07/26/21  0555 07/25/21  0604 07/24/21  1813   INR 2.02* 2.00* 2.24*   ,   Results for orders placed or performed during the hospital encounter of 07/23/21   XR Chest 2 Views    Narrative    EXAM: XR CHEST 2 VW  7/23/2021  6:19 PM     HISTORY:  LT eval       COMPARISON:  Chest abdomen pelvis CT 7/3/2021    FINDINGS: PA and lateral radiographs of the chest. The  cardiomediastinal silhouette is within normal limits. No pleural  effusion or pneumothorax. No focal airspace opacity. The visualized  upper abdomen is unremarkable. No acute osseous abnormality.  Interposition of bowel beneath the right hemidiaphragm.      Impression    IMPRESSION: No focal airspace disease.    I have personally reviewed the examination and initial interpretation  and I agree with the findings.    ALESSANDRO URIAS MD         SYSTEM ID:  [id number]   US Upper Extremity Venous Duplex Right    Narrative    EXAMINATION: DOPPLER VENOUS ULTRASOUND OF THE RIGHT UPPER EXTREMITY,  7/24/2021 3:47 PM     COMPARISON: None.    HISTORY: Right arm swelling    TECHNIQUE:  Gray-scale evaluation with compression, spectral flow and  color Doppler assessment of the deep venous system of the right upper  extremity.    FINDINGS:  Right: Normal blood flow and waveforms are demonstrated in the  internal jugular, innominate, subclavian, and axillary veins. There is  normal compressibility of the brachial and cephalic veins.      Impression    IMPRESSION:  1.  No evidence of right upper extremity deep venous thrombosis.  2.  Occlusive superficial thrombus in right basilic vein from upper  forearm to axilla.    I have personally reviewed the examination and initial interpretation  and I agree with the findings.    ALESSANDRO URIAS MD         SYSTEM ID:  [id number]   XR Chest 2 Views    Narrative    Chest 2 views    INDICATION: Acute liver failure. Worsening leukocytosis, assess for  pneumonia.    COMPARISON: 7/23/2021    FINDINGS: Heart size and shape appear normal. No new areas of  consolidation or infiltrate. Pulmonary vasculature appears normal.  Bony structures appear intact.      Impression    IMPRESSION: Negative    AILYN LYON MD         SYSTEM ID:  [id number]   Echo  Complete     Value    LVEF  60-65%    [facility]    [id number]  [id number]  [id number]  [id number]     [facility]  Echocardiography Laboratory  [address] [place] [id number]     Name: BANDAR PEPE  MRN: [id number]  : 1990  Study Date: 2021 10:11 AM  Age: 30 yrs  Gender: Female  Patient Location: [facility]  Reason For Study: Liver transplant  Ordering Physician: XU ADAMS  Referring Physician: ANJALI MALONEY  Performed By: Carrol Jacome RDCS     BSA: 2.0 m2  Height: 64 in  Weight: 202 lb  HR: 95  BP: 142/82 mmHg  ______________________________________________________________________________  Procedure  Complete Portable Echo Adult.  ______________________________________________________________________________  Interpretation Summary  Global and regional left ventricular function is normal with an EF of 60-65%.  Right ventricular function, chamber size, wall motion, and thickness are  normal.  Pulmonary artery systolic pressure is normal.  The inferior vena cava is normal.  No pericardial effusion is present.  There is no prior study for direct comparison.  ______________________________________________________________________________  Left Ventricle  Global and regional left ventricular function is normal with an EF of 60-65%.  Left ventricular wall thickness is normal. Left ventricular size is normal.  Left ventricular diastolic function is normal. No regional wall motion  abnormalities are seen.     Right Ventricle  Right ventricular function, chamber size, wall motion, and thickness are  normal.     Atria  Both atria appear normal.     Mitral Valve  The mitral valve is normal.     Aortic Valve  The valve leaflets are not well visualized. On Doppler interrogation, there is  no significant stenosis or regurgitation.     Tricuspid Valve  The tricuspid valve is normal. Trace tricuspid insufficiency is present. The  right  ventricular systolic pressure is approximated at 17.5 mmHg plus the  right atrial pressure. Pulmonary artery systolic pressure is normal.     Pulmonic Valve  The pulmonic valve is normal.     Vessels  The thoracic aorta is normal. The pulmonary artery is normal. The inferior  vena cava is normal.     Pericardium  No pericardial effusion is present.     Compared to Previous Study  There is no prior study for direct comparison.  ______________________________________________________________________________  MMode/2D Measurements & Calculations  IVSd: 0.98 cm     LVIDd: 3.4 cm  LVIDs: 2.4 cm  LVPWd: 1.2 cm  FS: 29.5 %  LV mass(C)d: 111.1 grams  LV mass(C)dI: 56.6 grams/m2  Ao root diam: 2.9 cm  asc Aorta Diam: 2.8 cm  LVOT diam: 2.0 cm  LVOT area: 3.1 cm2  LA Volume (BP): 29.1 ml  LA Volume Index (BP): 14.8 ml/m2  RWT: 0.68     Doppler Measurements & Calculations  MV E max cheikh: 77.6 cm/sec  MV A max cheikh: 81.0 cm/sec  MV E/A: 0.96  MV dec slope: 506.0 cm/sec2  PA acc time: 0.13 sec  TR max cheikh: 209.0 cm/sec  TR max P.5 mmHg  E/E' av.1  Lateral E/e': 6.2  Medial E/e': 9.9     ______________________________________________________________________________  Report approved by: Rin Toro 2021 11:02 AM               Discharge Medications   Current Discharge Medication List      CONTINUE these medications which have CHANGED    Details   predniSONE (DELTASONE) 5 MG tablet Take 6 tablets (30 mg) by mouth daily    Associated Diagnoses: Autoimmune hepatitis (H)           Allergies   Allergies   Allergen Reactions     Penicillins GI Disturbance

## 2021-07-27 ENCOUNTER — COMMITTEE REVIEW (OUTPATIENT)
Dept: TRANSPLANT | Facility: CLINIC | Age: 31
End: 2021-07-27

## 2021-07-27 LAB
ATRIAL RATE - MUSE: 103 BPM
CMV IGG SERPL IA-ACNC: <0.2 U/ML
CMV IGG SERPL IA-ACNC: NORMAL
DIASTOLIC BLOOD PRESSURE - MUSE: NORMAL MMHG
EBV VCA IGG SER IA-ACNC: 422 U/ML
EBV VCA IGG SER IA-ACNC: POSITIVE
HBV SURFACE AB SERPL IA-ACNC: 179.83 M[IU]/ML
HCG-TM SERPL-ACNC: <3 IU/L
INTERPRETATION ECG - MUSE: NORMAL
P AXIS - MUSE: 46 DEGREES
PR INTERVAL - MUSE: 148 MS
QRS DURATION - MUSE: 78 MS
QT - MUSE: 350 MS
QTC - MUSE: 458 MS
R AXIS - MUSE: 24 DEGREES
SYSTOLIC BLOOD PRESSURE - MUSE: NORMAL MMHG
T AXIS - MUSE: 26 DEGREES
VENTRICULAR RATE- MUSE: 103 BPM

## 2021-07-27 NOTE — COMMITTEE REVIEW
Abdominal Committee Review Note     Evaluation Date: 7/26/2021  Committee Review Date: 7/27/2021    Organ being evaluated for: Liver    Transplant Phase: Evaluation  Transplant Status: Active    Transplant Coordinator: Chinmay King Jr.  Transplant Surgeon:       Referring Physician: Sasha Fernandez    Primary Diagnosis: Cirrhosis: Autoimmune  Secondary Diagnosis:     Committee Review Members:  Nutrition Caroline Dobbins, RD   Pharmacist Bettye [place], [facility]    - Clinical ERIC Pacheco, Carlotta Kern, [facility]   Transplant Mary Jane Kay, RN, Niya Mayfield, RN, Sylvia Trent, RN, Di Alexis, ALICIA, Jr Chinmay King, ALICIA, Yaritza Rich MD, Kisha Barraza, APRN CNP, Soraya Alanis, ALICIA, Enrique Lombardi MD   Transplant Hepatology  Ashlee Borden MD, Craig Blake MD, Karen Paris MD, Thomas M. Leventhal, MD   Transplant Surgery Asher Devlin MD, Cristela Yuen MD, Jeffrey Orellana MD, Oumar Ramos MD, Saqib Gore MD, Parker Marquez MD       Transplant Eligibility: Acute Liver Failure, Autoimmune Hepatitis    Committee Review Decision: Needs Re-presentation    Relative Contraindications: Other, pending outpatient lab follow up    Absolute Contraindications: None    Committee Chair Ashlee Borden MD verbally attested to the committee's decision.    Committee Discussion Details:     Patient is a good candidate, but not approved.     Patient is improving and team is hoping that trend continues.     Plan is for her to get labs Monday and Thursday starting 7/29.    Dr. Borden will review labs and make decisions based on her trajectory.

## 2021-07-27 NOTE — TELEPHONE ENCOUNTER
Spoke with patient, who feels stable to a touch better.   Some swelling in ankles and legs    Plan for patient to do labs at  on Thursday, and then every Monday & THursday at  going forward.    Will discuss follow up plan with team at [facility] this afternoon (7/27)

## 2021-07-28 LAB
ETHYL GLUCURONIDE UR QL SCN: NEGATIVE NG/ML
QUANTIFERON MITOGEN: 3.09 IU/ML
QUANTIFERON NIL TUBE: 0 IU/ML
QUANTIFERON TB1 TUBE: 0 IU/ML
QUANTIFERON TB2 TUBE: 0

## 2021-07-30 LAB
BACTERIA BLD CULT: NO GROWTH
BACTERIA BLD CULT: NO GROWTH
GAMMA INTERFERON BACKGROUND BLD IA-ACNC: 0 IU/ML
M TB IFN-G BLD-IMP: NEGATIVE
M TB IFN-G CD4+ BCKGRND COR BLD-ACNC: 3.09 IU/ML
MITOGEN IGNF BCKGRD COR BLD-ACNC: 0 IU/ML
MITOGEN IGNF BCKGRD COR BLD-ACNC: 0 IU/ML

## 2021-08-02 ENCOUNTER — TRANSFERRED RECORDS (OUTPATIENT)
Dept: HEALTH INFORMATION MANAGEMENT | Facility: CLINIC | Age: 31
End: 2021-08-02

## 2021-08-02 LAB
ALT SERPL-CCNC: 132 U/L (ref 0–55)
AST SERPL-CCNC: 155 U/L (ref 10–40)
CREATININE (EXTERNAL): 0.87 MG/DL (ref 0.55–1.02)
GFR ESTIMATED (EXTERNAL): >60 ML/MIN/1.73M2
GLUCOSE (EXTERNAL): 94 MG/DL (ref 70–100)
INR (EXTERNAL): 1.7 (ref 0.9–1.1)
PETH BLD-MCNC: NEGATIVE NG/ML
POTASSIUM (EXTERNAL): 3.8 MMOL/L (ref 3.5–5.1)

## 2021-08-06 ENCOUNTER — TELEPHONE (OUTPATIENT)
Dept: TRANSPLANT | Facility: CLINIC | Age: 31
End: 2021-08-06

## 2021-08-06 NOTE — TELEPHONE ENCOUNTER
Provider Call: General  Route to LPN    Reason for call: connect with nell regarding evaluation     Call back needed? Yes    Return Call Needed  Same as documented in contacts section  When to return call?: Greater than one day: Route standard priority

## 2021-08-06 NOTE — TELEPHONE ENCOUNTER
Called back    Answered question    HP was just looking for update.    Patient following closely with Dr. Ashlee Borden, if she declines in the slightest plan is to list her.

## 2021-08-11 ENCOUNTER — LAB (OUTPATIENT)
Dept: LAB | Facility: CLINIC | Age: 31
End: 2021-08-11
Attending: STUDENT IN AN ORGANIZED HEALTH CARE EDUCATION/TRAINING PROGRAM
Payer: COMMERCIAL

## 2021-08-11 ENCOUNTER — OFFICE VISIT (OUTPATIENT)
Dept: GASTROENTEROLOGY | Facility: CLINIC | Age: 31
End: 2021-08-11
Attending: STUDENT IN AN ORGANIZED HEALTH CARE EDUCATION/TRAINING PROGRAM
Payer: COMMERCIAL

## 2021-08-11 VITALS
TEMPERATURE: 98 F | WEIGHT: 195 LBS | HEIGHT: 64 IN | OXYGEN SATURATION: 98 % | SYSTOLIC BLOOD PRESSURE: 145 MMHG | DIASTOLIC BLOOD PRESSURE: 93 MMHG | HEART RATE: 117 BPM | BODY MASS INDEX: 33.29 KG/M2

## 2021-08-11 DIAGNOSIS — K75.4 AUTOIMMUNE HEPATITIS (H): Primary | ICD-10-CM

## 2021-08-11 DIAGNOSIS — R79.89 ELEVATED LFTS: ICD-10-CM

## 2021-08-11 DIAGNOSIS — K75.4 AUTOIMMUNE HEPATITIS (H): ICD-10-CM

## 2021-08-11 LAB
ALBUMIN SERPL-MCNC: 2.3 G/DL (ref 3.4–5)
ALP SERPL-CCNC: 158 U/L (ref 40–150)
ALT SERPL W P-5'-P-CCNC: 103 U/L (ref 0–50)
ANION GAP SERPL CALCULATED.3IONS-SCNC: 4 MMOL/L (ref 3–14)
AST SERPL W P-5'-P-CCNC: 126 U/L (ref 0–45)
BILIRUB DIRECT SERPL-MCNC: 4.6 MG/DL (ref 0–0.2)
BILIRUB SERPL-MCNC: 5.9 MG/DL (ref 0.2–1.3)
BUN SERPL-MCNC: 10 MG/DL (ref 7–30)
CALCIUM SERPL-MCNC: 8.4 MG/DL (ref 8.5–10.1)
CHLORIDE BLD-SCNC: 107 MMOL/L (ref 94–109)
CO2 SERPL-SCNC: 29 MMOL/L (ref 20–32)
CREAT SERPL-MCNC: 0.84 MG/DL (ref 0.52–1.04)
ERYTHROCYTE [DISTWIDTH] IN BLOOD BY AUTOMATED COUNT: 18 % (ref 10–15)
GFR SERPL CREATININE-BSD FRML MDRD: >90 ML/MIN/1.73M2
GLUCOSE BLD-MCNC: 100 MG/DL (ref 70–99)
HCT VFR BLD AUTO: 38.4 % (ref 35–47)
HGB BLD-MCNC: 12.6 G/DL (ref 11.7–15.7)
INR PPP: 1.57 (ref 0.85–1.15)
MCH RBC QN AUTO: 29.7 PG (ref 26.5–33)
MCHC RBC AUTO-ENTMCNC: 32.8 G/DL (ref 31.5–36.5)
MCV RBC AUTO: 91 FL (ref 78–100)
PLATELET # BLD AUTO: 102 10E3/UL (ref 150–450)
POTASSIUM BLD-SCNC: 3.9 MMOL/L (ref 3.4–5.3)
PROT SERPL-MCNC: 5.2 G/DL (ref 6.8–8.8)
RBC # BLD AUTO: 4.24 10E6/UL (ref 3.8–5.2)
SODIUM SERPL-SCNC: 140 MMOL/L (ref 133–144)
WBC # BLD AUTO: 8 10E3/UL (ref 4–11)

## 2021-08-11 PROCEDURE — 99215 OFFICE O/P EST HI 40 MIN: CPT | Performed by: STUDENT IN AN ORGANIZED HEALTH CARE EDUCATION/TRAINING PROGRAM

## 2021-08-11 PROCEDURE — G0463 HOSPITAL OUTPT CLINIC VISIT: HCPCS

## 2021-08-11 PROCEDURE — 36415 COLL VENOUS BLD VENIPUNCTURE: CPT | Performed by: PATHOLOGY

## 2021-08-11 PROCEDURE — 85027 COMPLETE CBC AUTOMATED: CPT | Performed by: PATHOLOGY

## 2021-08-11 PROCEDURE — 80053 COMPREHEN METABOLIC PANEL: CPT | Performed by: PATHOLOGY

## 2021-08-11 PROCEDURE — 85610 PROTHROMBIN TIME: CPT | Performed by: PATHOLOGY

## 2021-08-11 PROCEDURE — 82248 BILIRUBIN DIRECT: CPT | Performed by: PATHOLOGY

## 2021-08-11 RX ORDER — AZATHIOPRINE 50 MG/1
50 TABLET ORAL DAILY
Qty: 90 TABLET | Refills: 3 | Status: SHIPPED | OUTPATIENT
Start: 2021-08-11 | End: 2021-09-28

## 2021-08-11 ASSESSMENT — MIFFLIN-ST. JEOR: SCORE: 1584.83

## 2021-08-11 ASSESSMENT — PAIN SCALES - GENERAL: PAINLEVEL: NO PAIN (0)

## 2021-08-11 NOTE — LETTER
"    8/11/2021         RE: Yaritza Dias  [address] [facility] [address]      [facility] Return Patient Visit    Date of Visit: August 11, 2021    Reason for referral: Follow up severe autoimmune hepatitis with necrosis    Subjective: Ms. Dias is a 30 year old woman with a history of mild LFT elevation during pregnancy, who presents for evaluation of severe auto immune hepatitis.     She presented with jaundice the end of June. Received her 2/2 COVID shots 5/2021. She had mild AST/ALT elevations during pregnancy, but normalized after pregnancy. She went to her PCP and got labs that showed   TBR 12.8 DBR 7.6 INR 1.6. She had a MRI 7/2/2021 that showed pericholecystic fluid. Also showed 4 \"masses\" in the liver - favored regenerative nodules. \"Slightly hyperintense on precontrast T1-weighted imaging, exhibits hypoechoic intensity on postcontrast imaging with enhancement of septations, exhibits diffusion restriction, and is hypointense on T2-weighted imaging. The largest mass is in the medial left hepatic lobe and is 7.7 x 6.5 x 6.2 cm. The right hepatic lobe is small. Much of the liver is in the left upper quadrant.\" Multiple liver cysts. Biliary system normal without dilation. Spleen was mildly enlarged with a few prominent belkis hepatic lymph nodes    Underwent liver bx 7/6 that showing severe hepatitis necrosis, thought to be autoimmune hepatitis, was started on prednisone 40 mg on July 14.  She had repeat labs done on July 18 showing worsening alkaline phosphatase and INR.  Patient also had hypotension upon her outpatient lab draw, upon second admission her white count was elevated concerning for sepsis, she was treated with antibiotics.  Her INR slightly went up to 2.2, patient was transferred to [facility] for further evaluation.    While admitted to [facility], labs were stable and LFTs slowly improving. Prednisone was weaned to 30 on discharge.     Interval Events  - " LFTs slowly downtrending, now on prednisone 25 mg daily  - She continues to feel well. Feels better on lower dose of steroids  - No s/s of liver decompensation    ROS: 14 point ROS negative except for positives noted in HPI.    PMHx:  Past Medical History:   Diagnosis Date     Gestational diabetes      PSHx:  No past surgical history on file.   Vaginal delivery  Liver bx    FamHx:  Family History   Problem Relation Age of Onset     Celiac Disease Sister    Father had coronary artery disease, sister has celiac disease, no other autoimmune disorders in family.  Mother in law had liver transplant for ALD    SocHx:  Social History     Socioeconomic History     Marital status:      Spouse name: Not on file     Number of children: Not on file     Years of education: Not on file     Highest education level: Not on file   Occupational History     Not on file   Tobacco Use     Smoking status: Never Smoker     Smokeless tobacco: Never Used   Substance and Sexual Activity     Alcohol use: Not on file     Comment: very rare social drinker     Drug use: Never     Sexual activity: Not on file   Other Topics Concern     Not on file   Social History Narrative    Lives with her , Jose R and their daughter Tesha.      Social Determinants of Health     Financial Resource Strain:      Difficulty of Paying Living Expenses:    Food Insecurity:      Worried About Running Out of Food in the Last Year:      Ran Out of Food in the Last Year:    Transportation Needs:      Lack of Transportation (Medical):      Lack of Transportation (Non-Medical):    Physical Activity:      Days of Exercise per Week:      Minutes of Exercise per Session:    Stress:      Feeling of Stress :    Social Connections:      Frequency of Communication with Friends and Family:      Frequency of Social Gatherings with Friends and Family:      Attends [employer] Services:      Active Member of Clubs or Organizations:      Attends Club or Organization  "Meetings:      Marital Status:    Intimate Partner Violence:      Fear of Current or Ex-Partner:      Emotionally Abused:      Physically Abused:      Sexually Abused:        Medications:  Current Outpatient Medications   Medication     azaTHIOprine (IMURAN) 50 MG tablet     predniSONE (DELTASONE) 5 MG tablet     No current facility-administered medications for this visit.       Allergies:  Allergies   Allergen Reactions     Penicillins GI Disturbance       Objective:  BP (!) 145/93   Pulse 117   Temp 98  F (36.7  C) (Oral)   Ht 1.626 m (5' 4.02\")   Wt 88.5 kg (195 lb)   SpO2 98%   BMI 33.45 kg/m    Constitutional: pleasant woman in NAD  Eyes: non icteric  Respiratory: Normal respiratory excursion   MSK: normal range of motion of visualized extremities  Abd: Non distended  Skin: No jaundice  Psychiatric: normal mood and orientation    Labs:  Last Comprehensive Metabolic Panel:  Sodium   Date Value Ref Range Status   08/11/2021 140 133 - 144 mmol/L Final     Potassium   Date Value Ref Range Status   08/11/2021 3.9 3.4 - 5.3 mmol/L Final     Chloride   Date Value Ref Range Status   08/11/2021 107 94 - 109 mmol/L Final     Carbon Dioxide (CO2)   Date Value Ref Range Status   08/11/2021 29 20 - 32 mmol/L Final     Anion Gap   Date Value Ref Range Status   08/11/2021 4 3 - 14 mmol/L Final     Glucose   Date Value Ref Range Status   08/11/2021 100 (H) 70 - 99 mg/dL Final     Urea Nitrogen   Date Value Ref Range Status   08/11/2021 10 7 - 30 mg/dL Final     Creatinine   Date Value Ref Range Status   08/11/2021 0.84 0.52 - 1.04 mg/dL Final     GFR Estimate   Date Value Ref Range Status   08/11/2021 >90 >60 mL/min/1.73m2 Final     Comment:     As of July 11, 2021, eGFR is calculated by the CKD-EPI creatinine equation, without race adjustment. eGFR can be influenced by muscle mass, exercise, and diet. The reported eGFR is an estimation only and is only applicable if the renal function is stable.     Calcium   Date Value " Ref Range Status   08/11/2021 8.4 (L) 8.5 - 10.1 mg/dL Final     Bilirubin Total   Date Value Ref Range Status   08/11/2021 5.9 (H) 0.2 - 1.3 mg/dL Final     Alkaline Phosphatase   Date Value Ref Range Status   08/11/2021 158 (H) 40 - 150 U/L Final     ALT   Date Value Ref Range Status   08/11/2021 103 (H) 0 - 50 U/L Final     AST   Date Value Ref Range Status   08/11/2021 126 (H) 0 - 45 U/L Final       Lab Results   Component Value Date    WBC 19.4 07/26/2021     Lab Results   Component Value Date    RBC 4.39 07/26/2021     Lab Results   Component Value Date    HGB 12.7 07/26/2021     Lab Results   Component Value Date    HCT 38.6 07/26/2021     Lab Results   Component Value Date    MCV 88 07/26/2021     Lab Results   Component Value Date    MCH 28.9 07/26/2021     Lab Results   Component Value Date    MCHC 32.9 07/26/2021     Lab Results   Component Value Date    RDW 21.2 07/26/2021     Lab Results   Component Value Date     07/26/2021       INR   Date Value Ref Range Status   08/11/2021 1.57 (H) 0.85 - 1.15 Final     Comment:     Effective 7/11/2021, the reference range for this assay has changed.     INR (External)   Date Value Ref Range Status   08/02/2021 1.7 (A) 0.9 - 1.1 Final   07/29/2021 1.8 (H) 0 - 1 Final        MELD-Na score: 18 at 8/11/2021  9:23 AM  MELD score: 18 at 8/11/2021  9:23 AM  Calculated from:  Serum Creatinine: 0.84 mg/dL (Using min of 1 mg/dL) at 8/11/2021  9:23 AM  Serum Sodium: 140 mmol/L (Using max of 137 mmol/L) at 8/11/2021  9:23 AM  Total Bilirubin: 5.9 mg/dL at 8/11/2021  9:23 AM  INR(ratio): 1.57 at 8/11/2021  9:23 AM  Age: 30 years     AMA negative  ROLANDO + 1:640  F actin 9  IgG normal  Anti LKM negative  SLA negative    Hepatitis A IgG positive, IgM negative  Hepatitis B Sag negative, core negative, ab immune  TPMT normal 30.9 (22-44)    8/5  TBR 8.6 DBR 5.8         7/2020   9/2020 AST 67 ALT 82  110/2020 AST 48  12/2020 and 1/2021 LFTs normal    RUQ US  7/22/2021    GALLBLADDER: Not evaluated     LIVER: Partially visualized liver lesions, better evaluated on the recent MRI.       IVC: Normal where visualized.     ABDOMINAL DUPLEX: Technically limited due to overlying bowel gas and body habitus. The middle hepatic vein is not visualized. The left hepatic vein is patent with flow in the normal direction. The right hepatic vein is diminutive but patent with flow in the normal direction.. The hepatic artery, IVC, portal veins, and splenic vein are patent with flow in the normal direction.     IMPRESSION:   1.  Technically limited Doppler evaluation. The middle hepatic vein is not visualized. The left hepatic vein is patent with flow in the normal direction. The right hepatic vein is diminutive but patent with flow in the normal direction. The hepatic artery, IVC, portal veins, and splenic vein are patent with flow in the normal direction.     MRI ABD 7/3/21:    IMPRESSION:   1.  Multiple liver masses are indeterminate. Regenerative nodules are favored. Metastatic disease is less likely given the patient's age. Atypical presentation of a primary liver mass is possible. Follow-up MRI could be considered. Biopsy could be considered.   2.  Mild splenomegaly is indeterminate. Portal hypertension could contribute to splenomegaly.   3.  Mild pericholecystic fluid is nonspecific. Acute cholecystitis is not excluded. There is no bile duct dilatation. No biliary filling defects or strictures identified.         MRI ABD 7/22/21:  IMPRESSION:   1.  Edematous gallbladder wall thickening has increased. Trace pericholecystic fluid is unchanged. Findings may indicate acalculus cholecystitis. Consider nuclear medicine hepatobiliary scan.     2.  Stable atypical appearance of the liver consistent with nonspecific heterogeneous parenchymal disease. The hepatic veins are poorly visualized. The differential diagnosis includes venoocclusive disease.     3.  Trace peripancreatic fluid has  mildly increased and suggests acute pancreatitis.      Liver Bx:  LIVER, NEEDLE BIOPSY:  Hepatitis, severe, grade 4 /4 activity:   -Etiologic considerations include vaccine-induced autoimmune hepatitis vs. Other   -No fibrosis or neoplastic/mass lesion identified     Assessment  30-year-old female with no significant past medical history, who had recent diagnosis of autoimmune hepatitis.     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 Has a history of mild AST/ALT elevation in the past, may have been predisposed to AI hepatitis, which can also flare after childbirth.   Independently reviewed labs and imaging.     Assessment/Plan: Ms. Dias is a 30 year old woman with a history of mild LFT elevation during pregnancy, who presents for follow up of severe auto immune hepatitis.     Bx showing severe hepatitis with necrosis - felt to be autoimmune type hepatitis potentially triggered by COVID vaccination. She had a liver of elevated LFTs that improved post partum, potentially predisposing her to a flare as well.     Her LFTs have been slowly improving with steroids. INR improving as well. On prednisone 20 mg daily.     Started liver transplant evaluation while inpatient, given her improvement with medical therapy, think it is less likely she will need a transplant urgently. Discussed that given the severity of her necrosis she will likely scar formation related to that and could have portal HTN related to that in the future.     - Decrease prednisone to 20 mg daily. Start imuran 50 mg daily. TPMT normal. Discussed goal to induce remission with medical therapy, after stability for 1-2 years can consider weaning medications but would need a biopsy prior to help guide that.   - Weekly LFTs, CBC, INR  - In the fall will consider repeat imaging or her liver. Low threshold to biopsy if her LFTs rise in the future.    RTC 3 months.    Ashlee Borden MD MS  Hepatology/Liver  Transplant  [facility]            Ashlee Borden MD

## 2021-08-11 NOTE — NURSING NOTE
"Chief Complaint   Patient presents with     RECHECK     Hospital follow up     BP (!) 145/93   Pulse 117   Temp 98  F (36.7  C) (Oral)   Ht 1.626 m (5' 4.02\")   Wt 88.5 kg (195 lb)   SpO2 98%   BMI 33.45 kg/m       Isak Srinivasan MA  "

## 2021-08-11 NOTE — LETTER
"    8/11/2021         RE: Yaritza Dias  [address]  [facility] [address]        Dear Colleague,    Thank you for referring your patient, Yaritza Dias, to the [facility] [place]. Please see a copy of my visit note below.    [facility] Return Patient Visit    Date of Visit: August 11, 2021    Reason for referral: Follow up severe autoimmune hepatitis with necrosis    Subjective: Ms. Dias is a 30 year old woman with a history of mild LFT elevation during pregnancy, who presents for evaluation of severe auto immune hepatitis.     She presented with jaundice the end of June. Received her 2/2 COVID shots 5/2021. She had mild AST/ALT elevations during pregnancy, but normalized after pregnancy. She went to her PCP and got labs that showed   TBR 12.8 DBR 7.6 INR 1.6. She had a MRI 7/2/2021 that showed pericholecystic fluid. Also showed 4 \"masses\" in the liver - favored regenerative nodules. \"Slightly hyperintense on precontrast T1-weighted imaging, exhibits hypoechoic intensity on postcontrast imaging with enhancement of septations, exhibits diffusion restriction, and is hypointense on T2-weighted imaging. The largest mass is in the medial left hepatic lobe and is 7.7 x 6.5 x 6.2 cm. The right hepatic lobe is small. Much of the liver is in the left upper quadrant.\" Multiple liver cysts. Biliary system normal without dilation. Spleen was mildly enlarged with a few prominent belkis hepatic lymph nodes    Underwent liver bx 7/6 that showing severe hepatitis necrosis, thought to be autoimmune hepatitis, was started on prednisone 40 mg on July 14.  She had repeat labs done on July 18 showing worsening alkaline phosphatase and INR.  Patient also had hypotension upon her outpatient lab draw, upon second admission her white count was elevated concerning for sepsis, she was treated with antibiotics.  Her INR slightly went up to 2.2, patient was " transferred to [facility] for further evaluation.    While admitted to [facility], labs were stable and LFTs slowly improving. Prednisone was weaned to 30 on discharge.     Interval Events  - LFTs slowly downtrending, now on prednisone 25 mg daily  - She continues to feel well. Feels better on lower dose of steroids  - No s/s of liver decompensation    ROS: 14 point ROS negative except for positives noted in HPI.    PMHx:  Past Medical History:   Diagnosis Date     Gestational diabetes      PSHx:  No past surgical history on file.   Vaginal delivery  Liver bx    FamHx:  Family History   Problem Relation Age of Onset     Celiac Disease Sister    Father had coronary artery disease, sister has celiac disease, no other autoimmune disorders in family.  Mother in law had liver transplant for ALD    SocHx:  Social History     Socioeconomic History     Marital status:      Spouse name: Not on file     Number of children: Not on file     Years of education: Not on file     Highest education level: Not on file   Occupational History     Not on file   Tobacco Use     Smoking status: Never Smoker     Smokeless tobacco: Never Used   Substance and Sexual Activity     Alcohol use: Not on file     Comment: very rare social drinker     Drug use: Never     Sexual activity: Not on file   Other Topics Concern     Not on file   Social History Narrative    Lives with her , Jose R and their daughter Tesha.      Social Determinants of Health     Financial Resource Strain:      Difficulty of Paying Living Expenses:    Food Insecurity:      Worried About Running Out of Food in the Last Year:      Ran Out of Food in the Last Year:    Transportation Needs:      Lack of Transportation (Medical):      Lack of Transportation (Non-Medical):    Physical Activity:      Days of Exercise per Week:      Minutes of Exercise per Session:    Stress:      Feeling of Stress :    Social Connections:      Frequency of Communication with Friends and Family:   "    Frequency of Social Gatherings with Friends and Family:      Attends Je[employer] witness Services:      Active Member of Clubs or Organizations:      Attends Club or Organization Meetings:      Marital Status:    Intimate Partner Violence:      Fear of Current or Ex-Partner:      Emotionally Abused:      Physically Abused:      Sexually Abused:        Medications:  Current Outpatient Medications   Medication     azaTHIOprine (IMURAN) 50 MG tablet     predniSONE (DELTASONE) 5 MG tablet     No current facility-administered medications for this visit.       Allergies:  Allergies   Allergen Reactions     Penicillins GI Disturbance       Objective:  BP (!) 145/93   Pulse 117   Temp 98  F (36.7  C) (Oral)   Ht 1.626 m (5' 4.02\")   Wt 88.5 kg (195 lb)   SpO2 98%   BMI 33.45 kg/m    Constitutional: pleasant woman in NAD  Eyes: non icteric  Respiratory: Normal respiratory excursion   MSK: normal range of motion of visualized extremities  Abd: Non distended  Skin: No jaundice  Psychiatric: normal mood and orientation    Labs:  Last Comprehensive Metabolic Panel:  Sodium   Date Value Ref Range Status   08/11/2021 140 133 - 144 mmol/L Final     Potassium   Date Value Ref Range Status   08/11/2021 3.9 3.4 - 5.3 mmol/L Final     Chloride   Date Value Ref Range Status   08/11/2021 107 94 - 109 mmol/L Final     Carbon Dioxide (CO2)   Date Value Ref Range Status   08/11/2021 29 20 - 32 mmol/L Final     Anion Gap   Date Value Ref Range Status   08/11/2021 4 3 - 14 mmol/L Final     Glucose   Date Value Ref Range Status   08/11/2021 100 (H) 70 - 99 mg/dL Final     Urea Nitrogen   Date Value Ref Range Status   08/11/2021 10 7 - 30 mg/dL Final     Creatinine   Date Value Ref Range Status   08/11/2021 0.84 0.52 - 1.04 mg/dL Final     GFR Estimate   Date Value Ref Range Status   08/11/2021 >90 >60 mL/min/1.73m2 Final     Comment:     As of July 11, 2021, eGFR is calculated by the CKD-EPI creatinine equation, without race adjustment. eGFR " can be influenced by muscle mass, exercise, and diet. The reported eGFR is an estimation only and is only applicable if the renal function is stable.     Calcium   Date Value Ref Range Status   08/11/2021 8.4 (L) 8.5 - 10.1 mg/dL Final     Bilirubin Total   Date Value Ref Range Status   08/11/2021 5.9 (H) 0.2 - 1.3 mg/dL Final     Alkaline Phosphatase   Date Value Ref Range Status   08/11/2021 158 (H) 40 - 150 U/L Final     ALT   Date Value Ref Range Status   08/11/2021 103 (H) 0 - 50 U/L Final     AST   Date Value Ref Range Status   08/11/2021 126 (H) 0 - 45 U/L Final       Lab Results   Component Value Date    WBC 19.4 07/26/2021     Lab Results   Component Value Date    RBC 4.39 07/26/2021     Lab Results   Component Value Date    HGB 12.7 07/26/2021     Lab Results   Component Value Date    HCT 38.6 07/26/2021     Lab Results   Component Value Date    MCV 88 07/26/2021     Lab Results   Component Value Date    MCH 28.9 07/26/2021     Lab Results   Component Value Date    MCHC 32.9 07/26/2021     Lab Results   Component Value Date    RDW 21.2 07/26/2021     Lab Results   Component Value Date     07/26/2021       INR   Date Value Ref Range Status   08/11/2021 1.57 (H) 0.85 - 1.15 Final     Comment:     Effective 7/11/2021, the reference range for this assay has changed.     INR (External)   Date Value Ref Range Status   08/02/2021 1.7 (A) 0.9 - 1.1 Final   07/29/2021 1.8 (H) 0 - 1 Final        MELD-Na score: 18 at 8/11/2021  9:23 AM  MELD score: 18 at 8/11/2021  9:23 AM  Calculated from:  Serum Creatinine: 0.84 mg/dL (Using min of 1 mg/dL) at 8/11/2021  9:23 AM  Serum Sodium: 140 mmol/L (Using max of 137 mmol/L) at 8/11/2021  9:23 AM  Total Bilirubin: 5.9 mg/dL at 8/11/2021  9:23 AM  INR(ratio): 1.57 at 8/11/2021  9:23 AM  Age: 30 years     AMA negative  ROLANDO + 1:640  F actin 9  IgG normal  Anti LKM negative  SLA negative    Hepatitis A IgG positive, IgM negative  Hepatitis B Sag negative, core negative, ab  immune  TPMT normal 30.9 (22-44)    8/5  TBR 8.6 DBR 5.8         7/2020   9/2020 AST 67 ALT 82  110/2020 AST 48  12/2020 and 1/2021 LFTs normal    RUQ US 7/22/2021    GALLBLADDER: Not evaluated     LIVER: Partially visualized liver lesions, better evaluated on the recent MRI.       IVC: Normal where visualized.     ABDOMINAL DUPLEX: Technically limited due to overlying bowel gas and body habitus. The middle hepatic vein is not visualized. The left hepatic vein is patent with flow in the normal direction. The right hepatic vein is diminutive but patent with flow in the normal direction.. The hepatic artery, IVC, portal veins, and splenic vein are patent with flow in the normal direction.     IMPRESSION:   1.  Technically limited Doppler evaluation. The middle hepatic vein is not visualized. The left hepatic vein is patent with flow in the normal direction. The right hepatic vein is diminutive but patent with flow in the normal direction. The hepatic artery, IVC, portal veins, and splenic vein are patent with flow in the normal direction.     MRI ABD 7/3/21:    IMPRESSION:   1.  Multiple liver masses are indeterminate. Regenerative nodules are favored. Metastatic disease is less likely given the patient's age. Atypical presentation of a primary liver mass is possible. Follow-up MRI could be considered. Biopsy could be considered.   2.  Mild splenomegaly is indeterminate. Portal hypertension could contribute to splenomegaly.   3.  Mild pericholecystic fluid is nonspecific. Acute cholecystitis is not excluded. There is no bile duct dilatation. No biliary filling defects or strictures identified.         MRI ABD 7/22/21:  IMPRESSION:   1.  Edematous gallbladder wall thickening has increased. Trace pericholecystic fluid is unchanged. Findings may indicate acalculus cholecystitis. Consider nuclear medicine hepatobiliary scan.     2.  Stable atypical appearance of the liver consistent with nonspecific  heterogeneous parenchymal disease. The hepatic veins are poorly visualized. The differential diagnosis includes venoocclusive disease.     3.  Trace peripancreatic fluid has mildly increased and suggests acute pancreatitis.      Liver Bx:  LIVER, NEEDLE BIOPSY:  Hepatitis, severe, grade 4 /4 activity:   -Etiologic considerations include vaccine-induced autoimmune hepatitis vs. Other   -No fibrosis or neoplastic/mass lesion identified     Assessment  30-year-old female with no significant past medical history, who had recent diagnosis of autoimmune hepatitis.     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 Has a history of mild AST/ALT elevation in the past, may have been predisposed to AI hepatitis, which can also flare after childbirth.   Independently reviewed labs and imaging.     Assessment/Plan: Ms. Dias is a 30 year old woman with a history of mild LFT elevation during pregnancy, who presents for follow up of severe auto immune hepatitis.     Bx showing severe hepatitis with necrosis - felt to be autoimmune type hepatitis potentially triggered by COVID vaccination. She had a liver of elevated LFTs that improved post partum, potentially predisposing her to a flare as well.     Her LFTs have been slowly improving with steroids. INR improving as well. On prednisone 20 mg daily.     Started liver transplant evaluation while inpatient, given her improvement with medical therapy, think it is less likely she will need a transplant urgently. Discussed that given the severity of her necrosis she will likely scar formation related to that and could have portal HTN related to that in the future.     - Decrease prednisone to 20 mg daily. Start imuran 50 mg daily. TPMT normal. Discussed goal to induce remission with medical therapy, after stability for 1-2 years can consider weaning medications but would need a biopsy prior to help guide that.   - Weekly LFTs, CBC, INR  - In the fall  will consider repeat imaging or her liver. Low threshold to biopsy if her LFTs rise in the future.    RTC 3 months.    Ashlee Borden MD MS  Hepatology/Liver Transplant  [facility]          Again, thank you for allowing me to participate in the care of your patient.        Sincerely,        Ashlee Borden MD

## 2021-08-11 NOTE — PROGRESS NOTES
"[facility] Return Patient Visit    Date of Visit: August 11, 2021    Reason for referral: Follow up severe autoimmune hepatitis with necrosis    Subjective: Ms. Dias is a 30 year old woman with a history of mild LFT elevation during pregnancy, who presents for evaluation of severe auto immune hepatitis.     She presented with jaundice the end of June. Received her 2/2 COVID shots 5/2021. She had mild AST/ALT elevations during pregnancy, but normalized after pregnancy. She went to her PCP and got labs that showed   TBR 12.8 DBR 7.6 INR 1.6. She had a MRI 7/2/2021 that showed pericholecystic fluid. Also showed 4 \"masses\" in the liver - favored regenerative nodules. \"Slightly hyperintense on precontrast T1-weighted imaging, exhibits hypoechoic intensity on postcontrast imaging with enhancement of septations, exhibits diffusion restriction, and is hypointense on T2-weighted imaging. The largest mass is in the medial left hepatic lobe and is 7.7 x 6.5 x 6.2 cm. The right hepatic lobe is small. Much of the liver is in the left upper quadrant.\" Multiple liver cysts. Biliary system normal without dilation. Spleen was mildly enlarged with a few prominent belkis hepatic lymph nodes    Underwent liver bx 7/6 that showing severe hepatitis necrosis, thought to be autoimmune hepatitis, was started on prednisone 40 mg on July 14.  She had repeat labs done on July 18 showing worsening alkaline phosphatase and INR.  Patient also had hypotension upon her outpatient lab draw, upon second admission her white count was elevated concerning for sepsis, she was treated with antibiotics.  Her INR slightly went up to 2.2, patient was transferred to [facility] for further evaluation.    While admitted to [facility], labs were stable and LFTs slowly improving. Prednisone was weaned to 30 on discharge.     Interval Events  - LFTs slowly downtrending, now on prednisone 25 mg daily  - She continues to feel well. " Feels better on lower dose of steroids  - No s/s of liver decompensation    ROS: 14 point ROS negative except for positives noted in HPI.    PMHx:  Past Medical History:   Diagnosis Date     Gestational diabetes      PSHx:  No past surgical history on file.   Vaginal delivery  Liver bx    FamHx:  Family History   Problem Relation Age of Onset     Celiac Disease Sister    Father had coronary artery disease, sister has celiac disease, no other autoimmune disorders in family.  Mother in law had liver transplant for ALD    SocHx:  Social History     Socioeconomic History     Marital status:      Spouse name: Not on file     Number of children: Not on file     Years of education: Not on file     Highest education level: Not on file   Occupational History     Not on file   Tobacco Use     Smoking status: Never Smoker     Smokeless tobacco: Never Used   Substance and Sexual Activity     Alcohol use: Not on file     Comment: very rare social drinker     Drug use: Never     Sexual activity: Not on file   Other Topics Concern     Not on file   Social History Narrative    Lives with her , Jose R and their daughter Tesha.      Social Determinants of Health     Financial Resource Strain:      Difficulty of Paying Living Expenses:    Food Insecurity:      Worried About Running Out of Food in the Last Year:      Ran Out of Food in the Last Year:    Transportation Needs:      Lack of Transportation (Medical):      Lack of Transportation (Non-Medical):    Physical Activity:      Days of Exercise per Week:      Minutes of Exercise per Session:    Stress:      Feeling of Stress :    Social Connections:      Frequency of Communication with Friends and Family:      Frequency of Social Gatherings with Friends and Family:      Attends Worship Services:      Active Member of Clubs or Organizations:      Attends Club or Organization Meetings:      Marital Status:    Intimate Partner Violence:      Fear of Current or  "Ex-Partner:      Emotionally Abused:      Physically Abused:      Sexually Abused:        Medications:  Current Outpatient Medications   Medication     azaTHIOprine (IMURAN) 50 MG tablet     predniSONE (DELTASONE) 5 MG tablet     No current facility-administered medications for this visit.       Allergies:  Allergies   Allergen Reactions     Penicillins GI Disturbance       Objective:  BP (!) 145/93   Pulse 117   Temp 98  F (36.7  C) (Oral)   Ht 1.626 m (5' 4.02\")   Wt 88.5 kg (195 lb)   SpO2 98%   BMI 33.45 kg/m    Constitutional: pleasant woman in NAD  Eyes: non icteric  Respiratory: Normal respiratory excursion   MSK: normal range of motion of visualized extremities  Abd: Non distended  Skin: No jaundice  Psychiatric: normal mood and orientation    Labs:  Last Comprehensive Metabolic Panel:  Sodium   Date Value Ref Range Status   08/11/2021 140 133 - 144 mmol/L Final     Potassium   Date Value Ref Range Status   08/11/2021 3.9 3.4 - 5.3 mmol/L Final     Chloride   Date Value Ref Range Status   08/11/2021 107 94 - 109 mmol/L Final     Carbon Dioxide (CO2)   Date Value Ref Range Status   08/11/2021 29 20 - 32 mmol/L Final     Anion Gap   Date Value Ref Range Status   08/11/2021 4 3 - 14 mmol/L Final     Glucose   Date Value Ref Range Status   08/11/2021 100 (H) 70 - 99 mg/dL Final     Urea Nitrogen   Date Value Ref Range Status   08/11/2021 10 7 - 30 mg/dL Final     Creatinine   Date Value Ref Range Status   08/11/2021 0.84 0.52 - 1.04 mg/dL Final     GFR Estimate   Date Value Ref Range Status   08/11/2021 >90 >60 mL/min/1.73m2 Final     Comment:     As of July 11, 2021, eGFR is calculated by the CKD-EPI creatinine equation, without race adjustment. eGFR can be influenced by muscle mass, exercise, and diet. The reported eGFR is an estimation only and is only applicable if the renal function is stable.     Calcium   Date Value Ref Range Status   08/11/2021 8.4 (L) 8.5 - 10.1 mg/dL Final     Bilirubin Total "   Date Value Ref Range Status   08/11/2021 5.9 (H) 0.2 - 1.3 mg/dL Final     Alkaline Phosphatase   Date Value Ref Range Status   08/11/2021 158 (H) 40 - 150 U/L Final     ALT   Date Value Ref Range Status   08/11/2021 103 (H) 0 - 50 U/L Final     AST   Date Value Ref Range Status   08/11/2021 126 (H) 0 - 45 U/L Final       Lab Results   Component Value Date    WBC 19.4 07/26/2021     Lab Results   Component Value Date    RBC 4.39 07/26/2021     Lab Results   Component Value Date    HGB 12.7 07/26/2021     Lab Results   Component Value Date    HCT 38.6 07/26/2021     Lab Results   Component Value Date    MCV 88 07/26/2021     Lab Results   Component Value Date    MCH 28.9 07/26/2021     Lab Results   Component Value Date    MCHC 32.9 07/26/2021     Lab Results   Component Value Date    RDW 21.2 07/26/2021     Lab Results   Component Value Date     07/26/2021       INR   Date Value Ref Range Status   08/11/2021 1.57 (H) 0.85 - 1.15 Final     Comment:     Effective 7/11/2021, the reference range for this assay has changed.     INR (External)   Date Value Ref Range Status   08/02/2021 1.7 (A) 0.9 - 1.1 Final   07/29/2021 1.8 (H) 0 - 1 Final        MELD-Na score: 18 at 8/11/2021  9:23 AM  MELD score: 18 at 8/11/2021  9:23 AM  Calculated from:  Serum Creatinine: 0.84 mg/dL (Using min of 1 mg/dL) at 8/11/2021  9:23 AM  Serum Sodium: 140 mmol/L (Using max of 137 mmol/L) at 8/11/2021  9:23 AM  Total Bilirubin: 5.9 mg/dL at 8/11/2021  9:23 AM  INR(ratio): 1.57 at 8/11/2021  9:23 AM  Age: 30 years     AMA negative  ROLANDO + 1:640  F actin 9  IgG normal  Anti LKM negative  SLA negative    Hepatitis A IgG positive, IgM negative  Hepatitis B Sag negative, core negative, ab immune  TPMT normal 30.9 (22-44)    8/5  TBR 8.6 DBR 5.8         7/2020   9/2020 AST 67 ALT 82  110/2020 AST 48  12/2020 and 1/2021 LFTs normal    RUQ US 7/22/2021    GALLBLADDER: Not evaluated     LIVER: Partially visualized liver  lesions, better evaluated on the recent MRI.       IVC: Normal where visualized.     ABDOMINAL DUPLEX: Technically limited due to overlying bowel gas and body habitus. The middle hepatic vein is not visualized. The left hepatic vein is patent with flow in the normal direction. The right hepatic vein is diminutive but patent with flow in the normal direction.. The hepatic artery, IVC, portal veins, and splenic vein are patent with flow in the normal direction.     IMPRESSION:   1.  Technically limited Doppler evaluation. The middle hepatic vein is not visualized. The left hepatic vein is patent with flow in the normal direction. The right hepatic vein is diminutive but patent with flow in the normal direction. The hepatic artery, IVC, portal veins, and splenic vein are patent with flow in the normal direction.     MRI ABD 7/3/21:    IMPRESSION:   1.  Multiple liver masses are indeterminate. Regenerative nodules are favored. Metastatic disease is less likely given the patient's age. Atypical presentation of a primary liver mass is possible. Follow-up MRI could be considered. Biopsy could be considered.   2.  Mild splenomegaly is indeterminate. Portal hypertension could contribute to splenomegaly.   3.  Mild pericholecystic fluid is nonspecific. Acute cholecystitis is not excluded. There is no bile duct dilatation. No biliary filling defects or strictures identified.         MRI ABD 7/22/21:  IMPRESSION:   1.  Edematous gallbladder wall thickening has increased. Trace pericholecystic fluid is unchanged. Findings may indicate acalculus cholecystitis. Consider nuclear medicine hepatobiliary scan.     2.  Stable atypical appearance of the liver consistent with nonspecific heterogeneous parenchymal disease. The hepatic veins are poorly visualized. The differential diagnosis includes venoocclusive disease.     3.  Trace peripancreatic fluid has mildly increased and suggests acute pancreatitis.      Liver Bx:  LIVER, NEEDLE  BIOPSY:  Hepatitis, severe, grade 4 /4 activity:   -Etiologic considerations include vaccine-induced autoimmune hepatitis vs. Other   -No fibrosis or neoplastic/mass lesion identified     Assessment  30-year-old female with no significant past medical history, who had recent diagnosis of autoimmune hepatitis.     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 Has a history of mild AST/ALT elevation in the past, may have been predisposed to AI hepatitis, which can also flare after childbirth.   Independently reviewed labs and imaging.     Assessment/Plan: Ms. Dias is a 30 year old woman with a history of mild LFT elevation during pregnancy, who presents for follow up of severe auto immune hepatitis.     Bx showing severe hepatitis with necrosis - felt to be autoimmune type hepatitis potentially triggered by COVID vaccination. She had a liver of elevated LFTs that improved post partum, potentially predisposing her to a flare as well.     Her LFTs have been slowly improving with steroids. INR improving as well. On prednisone 20 mg daily.     Started liver transplant evaluation while inpatient, given her improvement with medical therapy, think it is less likely she will need a transplant urgently. Discussed that given the severity of her necrosis she will likely scar formation related to that and could have portal HTN related to that in the future.     - Decrease prednisone to 20 mg daily. Start imuran 50 mg daily. TPMT normal. Discussed goal to induce remission with medical therapy, after stability for 1-2 years can consider weaning medications but would need a biopsy prior to help guide that.   - Weekly LFTs, CBC, INR  - In the fall will consider repeat imaging or her liver. Low threshold to biopsy if her LFTs rise in the future.    RTC 3 months.    Ashlee Borden MD MS  Hepatology/Liver Transplant  [facility]

## 2021-08-22 ENCOUNTER — HEALTH MAINTENANCE LETTER (OUTPATIENT)
Age: 31
End: 2021-08-22

## 2021-08-25 ENCOUNTER — TELEPHONE (OUTPATIENT)
Dept: TRANSPLANT | Facility: CLINIC | Age: 31
End: 2021-08-25

## 2021-08-25 NOTE — TELEPHONE ENCOUNTER
Provider Call: Voicemail  Date/Time: 8/25/2021  [id number]   Reason for call:  wondering if we have closed her case or still open  Dr there said no longer needed at this time Give her a call back

## 2021-08-30 NOTE — TELEPHONE ENCOUNTER
Spoke with Teresa.     she is going to call me back on 9/30 after patient meets with Dr. Borden next and we have time to discuss at confernce.

## 2021-09-03 DIAGNOSIS — K75.4 AUTOIMMUNE HEPATITIS (H): Primary | ICD-10-CM

## 2021-09-03 DIAGNOSIS — R79.89 ELEVATED LFTS: ICD-10-CM

## 2021-09-03 DIAGNOSIS — R17 ELEVATED BILIRUBIN: ICD-10-CM

## 2021-09-03 RX ORDER — URSODIOL 300 MG/1
300 CAPSULE ORAL 2 TIMES DAILY
Qty: 180 CAPSULE | Refills: 1 | Status: SHIPPED | OUTPATIENT
Start: 2021-09-03 | End: 2022-03-02

## 2021-09-22 ENCOUNTER — VIRTUAL VISIT (OUTPATIENT)
Dept: GASTROENTEROLOGY | Facility: CLINIC | Age: 31
End: 2021-09-22
Attending: STUDENT IN AN ORGANIZED HEALTH CARE EDUCATION/TRAINING PROGRAM
Payer: COMMERCIAL

## 2021-09-22 VITALS — WEIGHT: 190 LBS | BODY MASS INDEX: 32.44 KG/M2 | HEIGHT: 64 IN

## 2021-09-22 DIAGNOSIS — K75.4 AUTOIMMUNE HEPATITIS (H): Primary | ICD-10-CM

## 2021-09-22 DIAGNOSIS — K75.4 AUTOIMMUNE HEPATITIS (H): ICD-10-CM

## 2021-09-22 DIAGNOSIS — R17 ELEVATED BILIRUBIN: ICD-10-CM

## 2021-09-22 PROCEDURE — 99214 OFFICE O/P EST MOD 30 MIN: CPT | Mod: GT | Performed by: STUDENT IN AN ORGANIZED HEALTH CARE EDUCATION/TRAINING PROGRAM

## 2021-09-22 RX ORDER — PREDNISONE 5 MG/1
10 TABLET ORAL DAILY
Qty: 180 TABLET | Refills: 1 | Status: SHIPPED | OUTPATIENT
Start: 2021-09-22 | End: 2021-12-13

## 2021-09-22 ASSESSMENT — MIFFLIN-ST. JEOR: SCORE: 1556.83

## 2021-09-22 NOTE — PROGRESS NOTES
"Chief Complaint   Patient presents with     Follow Up     6 weeks     Height 1.626 m (5' 4\"), weight 86.2 kg (190 lb), not currently breastfeeding.    Yaritza is a 31 year old who is being evaluated via a billable video visit.      How would you like to obtain your AVS? MyChart  If the video visit is dropped, the invitation should be resent by: Other e-mail: use [email or web address]  Will anyone else be joining your video visit? No      Video Start Time: 12:34 PM  Video-Visit Details    Type of service:  Video Visit    Video End Time:12:45 PM    Originating Location (pt. Location): Home    Distant Location (provider location):  [facility] [place]     Platform used for Video Visit: Well     [facility] Return Patient Visit    Date of Visit: September 22nd, 2021    Reason for referral: Follow up severe autoimmune hepatitis with necrosis    Subjective: Ms. Dias is a 30 year old woman with a history of mild LFT elevation during pregnancy, who presents for evaluation of severe auto immune hepatitis.     She presented with jaundice the end of June. Received her 2/2 COVID shots 5/2021. She had mild AST/ALT elevations during pregnancy, but normalized after pregnancy. She went to her PCP and got labs that showed   TBR 12.8 DBR 7.6 INR 1.6. She had a MRI 7/2/2021 that showed pericholecystic fluid. Also showed 4 \"masses\" in the liver - favored regenerative nodules. \"Slightly hyperintense on precontrast T1-weighted imaging, exhibits hypoechoic intensity on postcontrast imaging with enhancement of septations, exhibits diffusion restriction, and is hypointense on T2-weighted imaging. The largest mass is in the medial left hepatic lobe and is 7.7 x 6.5 x 6.2 cm. The right hepatic lobe is small. Much of the liver is in the left upper quadrant.\" Multiple liver cysts. Biliary system normal without dilation. Spleen was mildly enlarged with a few prominent belkis hepatic lymph " nodes    Underwent liver bx 7/6 that showing severe hepatitis necrosis, thought to be autoimmune hepatitis, was started on prednisone 40 mg on July 14.  She had repeat labs done on July 18 showing worsening alkaline phosphatase and INR.  Patient also had hypotension upon her outpatient lab draw, upon second admission her white count was elevated concerning for sepsis, she was treated with antibiotics.  Her INR slightly went up to 2.2, patient was transferred to [facility] for further evaluation.    While admitted to [facility], labs were stable and LFTs slowly improving. Prednisone was weaned to 30 on discharge.     Interval Events  - Taking prednisone 10 mg daily and imuran 100 mg daily and ursodiol 300 mg BID  - AST/ALT normal, BR lagging but improving  - She continues to feel well. Feels better on lower dose of steroids  - No s/s of liver decompensation  - Not drinking alcohol    ROS: 14 point ROS negative except for positives noted in HPI.    PMHx:  Past Medical History:   Diagnosis Date     Gestational diabetes    Autoimmune hepatitis potentially triggered by COVID vaccination    PSHx:   Vaginal delivery  Liver bx    FamHx:  Family History   Problem Relation Age of Onset     Celiac Disease Sister    Father had coronary artery disease, sister has celiac disease, no other autoimmune disorders in family.  Mother in law had liver transplant for ALD    SocHx:  Social History     Socioeconomic History     Marital status:      Spouse name: Not on file     Number of children: Not on file     Years of education: Not on file     Highest education level: Not on file   Occupational History     Not on file   Tobacco Use     Smoking status: Never Smoker     Smokeless tobacco: Never Used   Substance and Sexual Activity     Alcohol use: Not on file     Comment: very rare social drinker     Drug use: Never     Sexual activity: Not on file   Other Topics Concern     Not on file   Social History Narrative    Lives with her ,  "Jose R and their daughter Tesha.      Social Determinants of Health     Financial Resource Strain:      Difficulty of Paying Living Expenses:    Food Insecurity:      Worried About Running Out of Food in the Last Year:      Ran Out of Food in the Last Year:    Transportation Needs:      Lack of Transportation (Medical):      Lack of Transportation (Non-Medical):    Physical Activity:      Days of Exercise per Week:      Minutes of Exercise per Session:    Stress:      Feeling of Stress :    Social Connections:      Frequency of Communication with Friends and Family:      Frequency of Social Gatherings with Friends and Family:      Attends Judaism Services:      Active Member of Clubs or Organizations:      Attends Club or Organization Meetings:      Marital Status:    Intimate Partner Violence:      Fear of Current or Ex-Partner:      Emotionally Abused:      Physically Abused:      Sexually Abused:        Medications:  Current Outpatient Medications   Medication     azaTHIOprine (IMURAN) 50 MG tablet     calcium gluconate 500 MG tablet     cholecalciferol 25 MCG (1000 UT) TABS     predniSONE (DELTASONE) 5 MG tablet     ursodiol (ACTIGALL) 300 MG capsule     No current facility-administered medications for this visit.       Allergies:  Allergies   Allergen Reactions     Penicillins GI Disturbance       Objective:  Ht 1.626 m (5' 4\")   Wt 86.2 kg (190 lb)   Breastfeeding No   BMI 32.61 kg/m    Constitutional: pleasant woman in NAD  Eyes: non icteric  Respiratory: Normal respiratory excursion   MSK: normal range of motion of visualized extremities  Abd: Non distended  Skin: No jaundice  Psychiatric: normal mood and orientation    Labs:  Last Comprehensive Metabolic Panel:  Sodium   Date Value Ref Range Status   08/11/2021 140 133 - 144 mmol/L Final     Potassium   Date Value Ref Range Status   08/11/2021 3.9 3.4 - 5.3 mmol/L Final     Chloride   Date Value Ref Range Status   08/11/2021 107 94 - 109 mmol/L Final "     Carbon Dioxide (CO2)   Date Value Ref Range Status   08/11/2021 29 20 - 32 mmol/L Final     Anion Gap   Date Value Ref Range Status   08/11/2021 4 3 - 14 mmol/L Final     Glucose   Date Value Ref Range Status   08/11/2021 100 (H) 70 - 99 mg/dL Final     Urea Nitrogen   Date Value Ref Range Status   08/11/2021 10 7 - 30 mg/dL Final     Creatinine   Date Value Ref Range Status   08/11/2021 0.84 0.52 - 1.04 mg/dL Final     GFR Estimate   Date Value Ref Range Status   08/11/2021 >90 >60 mL/min/1.73m2 Final     Comment:     As of July 11, 2021, eGFR is calculated by the CKD-EPI creatinine equation, without race adjustment. eGFR can be influenced by muscle mass, exercise, and diet. The reported eGFR is an estimation only and is only applicable if the renal function is stable.     Calcium   Date Value Ref Range Status   08/11/2021 8.4 (L) 8.5 - 10.1 mg/dL Final     Bilirubin Total   Date Value Ref Range Status   08/11/2021 5.9 (H) 0.2 - 1.3 mg/dL Final     Alkaline Phosphatase   Date Value Ref Range Status   08/11/2021 158 (H) 40 - 150 U/L Final     ALT   Date Value Ref Range Status   08/11/2021 103 (H) 0 - 50 U/L Final     AST   Date Value Ref Range Status   08/11/2021 126 (H) 0 - 45 U/L Final       Lab Results   Component Value Date    WBC 19.4 07/26/2021     Lab Results   Component Value Date    RBC 4.39 07/26/2021     Lab Results   Component Value Date    HGB 12.7 07/26/2021     Lab Results   Component Value Date    HCT 38.6 07/26/2021     Lab Results   Component Value Date    MCV 88 07/26/2021     Lab Results   Component Value Date    MCH 28.9 07/26/2021     Lab Results   Component Value Date    MCHC 32.9 07/26/2021     Lab Results   Component Value Date    RDW 21.2 07/26/2021     Lab Results   Component Value Date     07/26/2021       INR (External)   Date Value Ref Range Status   08/25/2021 1.4 (H) 0.9 - 1.1 Final        MELD-Na score: 18 at 8/11/2021  9:23 AM  MELD score: 18 at 8/11/2021  9:23  AM  Calculated from:  Serum Creatinine: 0.84 mg/dL (Using min of 1 mg/dL) at 8/11/2021  9:23 AM  Serum Sodium: 140 mmol/L (Using max of 137 mmol/L) at 8/11/2021  9:23 AM  Total Bilirubin: 5.9 mg/dL at 8/11/2021  9:23 AM  INR(ratio): 1.57 at 8/11/2021  9:23 AM  Age: 30 years     AMA negative  ROLANDO + 1:640  F actin 9  IgG normal  Anti LKM negative  SLA negative    Hepatitis A IgG positive, IgM negative  Hepatitis B Sag negative, core negative, ab immune  TPMT normal 30.9 (22-44)    RUQ US 7/22/2021    GALLBLADDER: Not evaluated     LIVER: Partially visualized liver lesions, better evaluated on the recent MRI.       IVC: Normal where visualized.     ABDOMINAL DUPLEX: Technically limited due to overlying bowel gas and body habitus. The middle hepatic vein is not visualized. The left hepatic vein is patent with flow in the normal direction. The right hepatic vein is diminutive but patent with flow in the normal direction.. The hepatic artery, IVC, portal veins, and splenic vein are patent with flow in the normal direction.     IMPRESSION:   1.  Technically limited Doppler evaluation. The middle hepatic vein is not visualized. The left hepatic vein is patent with flow in the normal direction. The right hepatic vein is diminutive but patent with flow in the normal direction. The hepatic artery, IVC, portal veins, and splenic vein are patent with flow in the normal direction.     MRI ABD 7/3/21:    IMPRESSION:   1.  Multiple liver masses are indeterminate. Regenerative nodules are favored. Metastatic disease is less likely given the patient's age. Atypical presentation of a primary liver mass is possible. Follow-up MRI could be considered. Biopsy could be considered.   2.  Mild splenomegaly is indeterminate. Portal hypertension could contribute to splenomegaly.   3.  Mild pericholecystic fluid is nonspecific. Acute cholecystitis is not excluded. There is no bile duct dilatation. No biliary filling defects or strictures  identified.         MRI ABD 7/22/21:  IMPRESSION:   1.  Edematous gallbladder wall thickening has increased. Trace pericholecystic fluid is unchanged. Findings may indicate acalculus cholecystitis. Consider nuclear medicine hepatobiliary scan.     2.  Stable atypical appearance of the liver consistent with nonspecific heterogeneous parenchymal disease. The hepatic veins are poorly visualized. The differential diagnosis includes venoocclusive disease.     3.  Trace peripancreatic fluid has mildly increased and suggests acute pancreatitis.      Liver Bx:  LIVER, NEEDLE BIOPSY:  Hepatitis, severe, grade 4 /4 activity:   -Etiologic considerations include vaccine-induced autoimmune hepatitis vs. Other   -No fibrosis or neoplastic/mass lesion identified     Assessment  30-year-old female with no significant past medical history, who had recent diagnosis of autoimmune hepatitis.     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 Has a history of mild AST/ALT elevation in the past, may have been predisposed to AI hepatitis, which can also flare after childbirth.   Independently reviewed labs and imaging.     Assessment/Plan: Ms. Dias is a 30 year old woman with a history of mild LFT elevation during pregnancy, who presents for follow up of severe auto immune hepatitis.     Bx showing severe hepatitis with necrosis - felt to be autoimmune type hepatitis potentially triggered by COVID vaccination. She had a liver of elevated LFTs that improved post partum, potentially predisposing her to a flare as well.     Her LFTs have been slowly improving with steroids + imuran + ursodiol. INR improving as well.     Started liver transplant evaluation while inpatient, given her improvement with medical therapy, closed transplant evaluation. Discussed that given the severity of her necrosis she could have scar formation related to that and could have portal HTN related to that in the future. Her  platelet count has normalized, which is reassuring.     - Continue prednisone 10 mg daily - decrease by 2.5 mg with every decrease  - Continue imuran 100 mg daily. Recommend she get a pap smear with her OB/GYN while on this. Discussed risks of skin cancer. Discussed risk of pancreatitis. CBC stable on this  - Continue ursodiol 300 mg BID  - CBC, CMP, INR every 2 weeks  - Discussed that if LFTs normal on imuran, would get bx prior to weaning. She may need repeat imaging in the future    RTC 3 months.    Ashlee Borden MD MS  Hepatology/Liver Transplant  [facility]

## 2021-09-22 NOTE — LETTER
"    9/22/2021         RE: Yaritza Dias  [address] [facility] [address]        Dear Colleague,    Thank you for referring your patient, Yaritza Dias, to the [facility] [place]. Please see a copy of my visit note below.    Chief Complaint   Patient presents with     Follow Up     6 weeks     Height 1.626 m (5' 4\"), weight 86.2 kg (190 lb), not currently breastfeeding.    Yaritza is a 31 year old who is being evaluated via a billable video visit.      How would you like to obtain your AVS? MyChart  If the video visit is dropped, the invitation should be resent by: Other e-mail: use [employer]  Will anyone else be joining your video visit? No      Video Start Time: 12:34 PM  Video-Visit Details    Type of service:  Video Visit    Video End Time:12:45 PM    Originating Location (pt. Location): Home    Distant Location (provider location):  [facility] HEPATOLOG[facility]     Platform used for Video Visit: [employer]     [facility] Return Patient Visit    Date of Visit: September 22nd, 2021    Reason for referral: Follow up severe autoimmune hepatitis with necrosis    Subjective: Ms. Dias is a 30 year old woman with a history of mild LFT elevation during pregnancy, who presents for evaluation of severe auto immune hepatitis.     She presented with jaundice the end of June. Received her 2/2 COVID shots 5/2021. She had mild AST/ALT elevations during pregnancy, but normalized after pregnancy. She went to her PCP and got labs that showed   TBR 12.8 DBR 7.6 INR 1.6. She had a MRI 7/2/2021 that showed pericholecystic fluid. Also showed 4 \"masses\" in the liver - favored regenerative nodules. \"Slightly hyperintense on precontrast T1-weighted imaging, exhibits hypoechoic intensity on postcontrast imaging with enhancement of septations, exhibits diffusion restriction, and is hypointense on T2-weighted imaging. The largest mass is in the " "medial left hepatic lobe and is 7.7 x 6.5 x 6.2 cm. The right hepatic lobe is small. Much of the liver is in the left upper quadrant.\" Multiple liver cysts. Biliary system normal without dilation. Spleen was mildly enlarged with a few prominent belkis hepatic lymph nodes    Underwent liver bx 7/6 that showing severe hepatitis necrosis, thought to be autoimmune hepatitis, was started on prednisone 40 mg on July 14.  She had repeat labs done on July 18 showing worsening alkaline phosphatase and INR.  Patient also had hypotension upon her outpatient lab draw, upon second admission her white count was elevated concerning for sepsis, she was treated with antibiotics.  Her INR slightly went up to 2.2, patient was transferred to [facility] for further evaluation.    While admitted to [facility], labs were stable and LFTs slowly improving. Prednisone was weaned to 30 on discharge.     Interval Events  - Taking prednisone 10 mg daily and imuran 100 mg daily and ursodiol 300 mg BID  - AST/ALT normal, BR lagging but improving  - She continues to feel well. Feels better on lower dose of steroids  - No s/s of liver decompensation  - Not drinking alcohol    ROS: 14 point ROS negative except for positives noted in HPI.    PMHx:  Past Medical History:   Diagnosis Date     Gestational diabetes    Autoimmune hepatitis potentially triggered by COVID vaccination    PSHx:   Vaginal delivery  Liver bx    FamHx:  Family History   Problem Relation Age of Onset     Celiac Disease Sister    Father had coronary artery disease, sister has celiac disease, no other autoimmune disorders in family.  Mother in law had liver transplant for ALD    SocHx:  Social History     Socioeconomic History     Marital status:      Spouse name: Not on file     Number of children: Not on file     Years of education: Not on file     Highest education level: Not on file   Occupational History     Not on file   Tobacco Use     Smoking status: Never Smoker     Smokeless " "tobacco: Never Used   Substance and Sexual Activity     Alcohol use: Not on file     Comment: very rare social drinker     Drug use: Never     Sexual activity: Not on file   Other Topics Concern     Not on file   Social History Narrative    Lives with her , Jose R and their daughter Tesha.      Social Determinants of Health     Financial Resource Strain:      Difficulty of Paying Living Expenses:    Food Insecurity:      Worried About Running Out of Food in the Last Year:      Ran Out of Food in the Last Year:    Transportation Needs:      Lack of Transportation (Medical):      Lack of Transportation (Non-Medical):    Physical Activity:      Days of Exercise per Week:      Minutes of Exercise per Session:    Stress:      Feeling of Stress :    Social Connections:      Frequency of Communication with Friends and Family:      Frequency of Social Gatherings with Friends and Family:      Attends Judaism Services:      Active Member of Clubs or Organizations:      Attends Club or Organization Meetings:      Marital Status:    Intimate Partner Violence:      Fear of Current or Ex-Partner:      Emotionally Abused:      Physically Abused:      Sexually Abused:        Medications:  Current Outpatient Medications   Medication     azaTHIOprine (IMURAN) 50 MG tablet     calcium gluconate 500 MG tablet     cholecalciferol 25 MCG (1000 UT) TABS     predniSONE (DELTASONE) 5 MG tablet     ursodiol (ACTIGALL) 300 MG capsule     No current facility-administered medications for this visit.       Allergies:  Allergies   Allergen Reactions     Penicillins GI Disturbance       Objective:  Ht 1.626 m (5' 4\")   Wt 86.2 kg (190 lb)   Breastfeeding No   BMI 32.61 kg/m    Constitutional: pleasant woman in NAD  Eyes: non icteric  Respiratory: Normal respiratory excursion   MSK: normal range of motion of visualized extremities  Abd: Non distended  Skin: No jaundice  Psychiatric: normal mood and orientation    Labs:  Last Comprehensive " Metabolic Panel:  Sodium   Date Value Ref Range Status   08/11/2021 140 133 - 144 mmol/L Final     Potassium   Date Value Ref Range Status   08/11/2021 3.9 3.4 - 5.3 mmol/L Final     Chloride   Date Value Ref Range Status   08/11/2021 107 94 - 109 mmol/L Final     Carbon Dioxide (CO2)   Date Value Ref Range Status   08/11/2021 29 20 - 32 mmol/L Final     Anion Gap   Date Value Ref Range Status   08/11/2021 4 3 - 14 mmol/L Final     Glucose   Date Value Ref Range Status   08/11/2021 100 (H) 70 - 99 mg/dL Final     Urea Nitrogen   Date Value Ref Range Status   08/11/2021 10 7 - 30 mg/dL Final     Creatinine   Date Value Ref Range Status   08/11/2021 0.84 0.52 - 1.04 mg/dL Final     GFR Estimate   Date Value Ref Range Status   08/11/2021 >90 >60 mL/min/1.73m2 Final     Comment:     As of July 11, 2021, eGFR is calculated by the CKD-EPI creatinine equation, without race adjustment. eGFR can be influenced by muscle mass, exercise, and diet. The reported eGFR is an estimation only and is only applicable if the renal function is stable.     Calcium   Date Value Ref Range Status   08/11/2021 8.4 (L) 8.5 - 10.1 mg/dL Final     Bilirubin Total   Date Value Ref Range Status   08/11/2021 5.9 (H) 0.2 - 1.3 mg/dL Final     Alkaline Phosphatase   Date Value Ref Range Status   08/11/2021 158 (H) 40 - 150 U/L Final     ALT   Date Value Ref Range Status   08/11/2021 103 (H) 0 - 50 U/L Final     AST   Date Value Ref Range Status   08/11/2021 126 (H) 0 - 45 U/L Final       Lab Results   Component Value Date    WBC 19.4 07/26/2021     Lab Results   Component Value Date    RBC 4.39 07/26/2021     Lab Results   Component Value Date    HGB 12.7 07/26/2021     Lab Results   Component Value Date    HCT 38.6 07/26/2021     Lab Results   Component Value Date    MCV 88 07/26/2021     Lab Results   Component Value Date    MCH 28.9 07/26/2021     Lab Results   Component Value Date    MCHC 32.9 07/26/2021     Lab Results   Component Value Date     RDW 21.2 07/26/2021     Lab Results   Component Value Date     07/26/2021       INR (External)   Date Value Ref Range Status   08/25/2021 1.4 (H) 0.9 - 1.1 Final        MELD-Na score: 18 at 8/11/2021  9:23 AM  MELD score: 18 at 8/11/2021  9:23 AM  Calculated from:  Serum Creatinine: 0.84 mg/dL (Using min of 1 mg/dL) at 8/11/2021  9:23 AM  Serum Sodium: 140 mmol/L (Using max of 137 mmol/L) at 8/11/2021  9:23 AM  Total Bilirubin: 5.9 mg/dL at 8/11/2021  9:23 AM  INR(ratio): 1.57 at 8/11/2021  9:23 AM  Age: 30 years     AMA negative  ROLANDO + 1:640  F actin 9  IgG normal  Anti LKM negative  SLA negative    Hepatitis A IgG positive, IgM negative  Hepatitis B Sag negative, core negative, ab immune  TPMT normal 30.9 (22-44)    RUQ US 7/22/2021    GALLBLADDER: Not evaluated     LIVER: Partially visualized liver lesions, better evaluated on the recent MRI.       IVC: Normal where visualized.     ABDOMINAL DUPLEX: Technically limited due to overlying bowel gas and body habitus. The middle hepatic vein is not visualized. The left hepatic vein is patent with flow in the normal direction. The right hepatic vein is diminutive but patent with flow in the normal direction.. The hepatic artery, IVC, portal veins, and splenic vein are patent with flow in the normal direction.     IMPRESSION:   1.  Technically limited Doppler evaluation. The middle hepatic vein is not visualized. The left hepatic vein is patent with flow in the normal direction. The right hepatic vein is diminutive but patent with flow in the normal direction. The hepatic artery, IVC, portal veins, and splenic vein are patent with flow in the normal direction.     MRI ABD 7/3/21:    IMPRESSION:   1.  Multiple liver masses are indeterminate. Regenerative nodules are favored. Metastatic disease is less likely given the patient's age. Atypical presentation of a primary liver mass is possible. Follow-up MRI could be considered. Biopsy could be considered.   2.  Mild  splenomegaly is indeterminate. Portal hypertension could contribute to splenomegaly.   3.  Mild pericholecystic fluid is nonspecific. Acute cholecystitis is not excluded. There is no bile duct dilatation. No biliary filling defects or strictures identified.         MRI ABD 7/22/21:  IMPRESSION:   1.  Edematous gallbladder wall thickening has increased. Trace pericholecystic fluid is unchanged. Findings may indicate acalculus cholecystitis. Consider nuclear medicine hepatobiliary scan.     2.  Stable atypical appearance of the liver consistent with nonspecific heterogeneous parenchymal disease. The hepatic veins are poorly visualized. The differential diagnosis includes venoocclusive disease.     3.  Trace peripancreatic fluid has mildly increased and suggests acute pancreatitis.      Liver Bx:  LIVER, NEEDLE BIOPSY:  Hepatitis, severe, grade 4 /4 activity:   -Etiologic considerations include vaccine-induced autoimmune hepatitis vs. Other   -No fibrosis or neoplastic/mass lesion identified     Assessment  30-year-old female with no significant past medical history, who had recent diagnosis of autoimmune hepatitis.     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 Has a history of mild AST/ALT elevation in the past, may have been predisposed to AI hepatitis, which can also flare after childbirth.   Independently reviewed labs and imaging.     Assessment/Plan: Ms. Dias is a 30 year old woman with a history of mild LFT elevation during pregnancy, who presents for follow up of severe auto immune hepatitis.     Bx showing severe hepatitis with necrosis - felt to be autoimmune type hepatitis potentially triggered by COVID vaccination. She had a liver of elevated LFTs that improved post partum, potentially predisposing her to a flare as well.     Her LFTs have been slowly improving with steroids + imuran + ursodiol. INR improving as well.     Started liver transplant evaluation while  inpatient, given her improvement with medical therapy, closed transplant evaluation. Discussed that given the severity of her necrosis she could have scar formation related to that and could have portal HTN related to that in the future. Her platelet count has normalized, which is reassuring.     - Continue prednisone 10 mg daily - decrease by 2.5 mg with every decrease  - Continue imuran 100 mg daily. Recommend she get a pap smear with her OB/GYN while on this. Discussed risks of skin cancer. Discussed risk of pancreatitis. CBC stable on this  - Continue ursodiol 300 mg BID  - CBC, CMP, INR every 2 weeks  - Discussed that if LFTs normal on imuran, would get bx prior to weaning. She may need repeat imaging in the future    RTC 3 months.    Ashlee Borden MD MS  Hepatology/Liver Transplant  [facility]                Again, thank you for allowing me to participate in the care of your patient.        Sincerely,        Ashlee Borden MD

## 2021-09-28 ENCOUNTER — COMMITTEE REVIEW (OUTPATIENT)
Dept: TRANSPLANT | Facility: CLINIC | Age: 31
End: 2021-09-28

## 2021-09-28 DIAGNOSIS — K75.4 AUTOIMMUNE HEPATITIS (H): Primary | ICD-10-CM

## 2021-09-28 DIAGNOSIS — K75.4 AUTOIMMUNE HEPATITIS (H): ICD-10-CM

## 2021-09-28 RX ORDER — AZATHIOPRINE 50 MG/1
100 TABLET ORAL DAILY
Qty: 90 TABLET | Refills: 3 | Status: SHIPPED | OUTPATIENT
Start: 2021-09-28 | End: 2022-01-10

## 2021-09-28 NOTE — Clinical Note
Please set up [employer] visit, either video or in-person, sometime in December of this year. Labs before. Thanks, tk

## 2021-09-28 NOTE — COMMITTEE REVIEW
Abdominal Committee Review Note     Evaluation Date: 7/26/2021  Committee Review Date: 9/28/2021    Organ being evaluated for: Liver    Transplant Phase: Evaluation  Transplant Status: Active    Transplant Coordinator: Chinmay King Jr.  Transplant Surgeon:       Referring Physician: Sasha Fernandez    Primary Diagnosis: Cirrhosis: Autoimmune  Secondary Diagnosis:     Committee Review Members:  Nutrition Caroline Dobbins, RD   Pharmacist Bettye Jackson, [facility]    - Clinical Annie Villatoro, ERIC, Carlotta Kern, [facility]   Transplant Niya Mayfield, ALICIA, Sylvia Trent, RN, Di Alexis, ALICIA, Jr Chinmay King, ALICIA, Yaritza Rich MD, Kisha Barraza, APRN CNP, Enrique Lombardi MD   Transplant Hepatology  Noah Chu MD, Ashlee Borden MD, Craig Blake MD, Thomas M. Leventhal, MD   Transplant Surgery Asher Devlin MD, Jeffrey Orellana MD, Oumar Ramos MD, Parker Marquez MD       Transplant Eligibility: Autoimmune Hepatitis    Committee Review Decision: Declined    Relative Contraindications: None, Other, Too well - has recovered and txp not needed at this time    Absolute Contraindications: None    Committee Chair Noah Chu MD verbally attested to the committee's decision.    Committee Discussion Details:     Close evaluation. Better. txp not needed.

## 2021-09-28 NOTE — LETTER
September 30, 2021    Yaritza Dias  [address] [address]    Dear Ms. Dias,   The purpose of this letter is to let you know that on  the [facility] Multi-Disciplinary Selection Team reviewed the results of your transplant evaluation.  Based on the results of your evaluation and the selection criteria used by our program, the decision was made to not list you on the liver transplant list. This is because your liver tests continue to improve and you do not need a transplant at this time.   Important things you should know:    If you would like to discuss the decision, or if your medical status changes you may schedule a return visits with your doctor by calling [phone] and asking to speak to your transplant coordinator.    We recommend that you continue to follow up with your primary care doctor and Dr. Ashlee Borden in order to manage your health concerns.  Enclosed is a letter from [facility] which describes the services offered to patients by [facility] and the Organ Procurement and Transplantation Network.  Thank you for allowing us to participate in your care.  We wish you well.  Sincerely,    Chinmay King Jr., BSN, RN  Liver Transplant Coordinator  [phone]    Enclosures: [facility] Letter  cc: Care Team            The Organ Procurement and Transplantation Network  Toll-free patient services line:     Your resource for organ transplant information    If you have a question regarding your own medical care, you always should call your transplant hospital first. However, for general organ transplant-related information, you can call the Organ Procurement and Transplantation Network (OPTN) toll-free patient services line at [phone]- [phone]. Anyone, including potential transplant candidates, candidates, recipients, family members, friends, living donors, and donor family members, can call this number to:          Talk about organ donation, living donation,  the transplant process, the donation process, and transplant policies.    Get a free patient information kit with helpful booklets, waiting list and transplant information, and a list of all transplant hospitals.    Ask questions about the OPTN website (https://optn.transplant.hrsa.gov/), the United Network for Organ Sharing s (UNOS) website (https://unos.org/), or the UNOS website for living donors and transplant recipients. (https://www.transplantliving.org/).    Learn how the OPTN can help you.    Talk about any concerns that you may have with a transplant hospital.    The [facility] transplant system, the OPTN, is managed under federal contract by the United Network for Organ Sharing (UNOS), which is a non-profit charitable organization. The OPTN helps create and define organ sharing policies that make the best use of donated organs. This process continuously evaluating new advances and discoveries so policies can be adapted to best serve patients waiting for transplants. To do so, the OPTN works closely with transplant professionals, transplant patients, transplant candidates, donor families, living donors, and the public. All transplant programs and organ procurement organizations throughout the country are OPTN members and are obligated to follow the policies the OPTN creates for allocating organs.    The OPTN also is responsible for:      Providing educational material for patients, the public, and professionals.    Raising awareness of the need for donated organs and tissue.    Coordinating organ procurement, matching, and placement.    Collecting information about every organ transplant and donation that occurs in the [place].    Remember, you should contact your transplant hospital directly if you have questions or concerns about your own medical care including medical records, work-up progress, and test results.    We are not your transplant hospital, and our staff will not be able to answer  questions about your case, so please keep your transplant hospital s phone number handy.    However, while you research your transplant needs and learn as much as you can about transplantation and donation, we welcome your call to our toll-free patient services line at [phone].          Updated 4/1/2019

## 2021-09-30 ENCOUNTER — TELEPHONE (OUTPATIENT)
Dept: GASTROENTEROLOGY | Facility: CLINIC | Age: 31
End: 2021-09-30

## 2021-10-12 NOTE — CONSULTS
Psychosocial Assessment for Liver Transplant Evaluation  Yaritza Dias was seen on [facility] as part of her evaluation as a potential liver transplant recipient.    Living Situation: Yaritza lives with her  Jose R and their six month old infant Tesha in a house in [place], [place].  Yaritza reports they have lived at this residence for the past year and a half.  She denies any concerns with their living situation.  Yaritza did not require any care giving prior to hospitalization.  Education/Employment:  Yaritza graduated high school and obtained a bachelor's degree in political science from the [employer].  Yaritza is not a .  Yaritza has worked fro [employer] for the past three and a half years.  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    Financial /Income: Yaritza and her  Jose R both have income from their employment.  Yaritza is employed at [employer] and Jose R is employed by [employer].  Yaritza reports having short and long term disability benefits available to her.  She used short-term disability for her maternity leave and is familiar with filing a claim.  Yaritza continues to work, even during this hospitalization.  Health Insurance:  [employer], through Yaritza's employer.  This writer talked with Yaritza about the financial risks of transplant, particularly about the high cost of transplant related medications and the importance of maintaining adequate health insurance coverage.  Family/Social Support:  Yaritza and her  Jose R have been  for five years.  Yaritza reports Jose R is involved and supportive.  They have a six month old baby girl named Tesha.  She was born with pulmonary valve stenosis which has required medical care.  Yaritza reports having strong support from her parents, Minh and Tru, who live in [place] on [address], and from her three sisters: Annamarie ([place], [place]), Patricia (lives with parents)  and Anastasia ([place], [place]).  This writer stressed the importance of having a stable and involved support network before and after transplant.  Provided Yaritza  with education about the relationship between a stable support system and better surgical and post-transplant outcomes compared to patients with a limited support system.    Functional Status: There are no functional concerns to transplant.  Chemical Dependency:  Yaritza denies any history of using tobacco products, illicit drug use, pain medication abuse or heavy alcohol consumption.  Her last consumption of alcohol was a glass of wine on Father's Day in  of this year.  Yaritza reports an average of one glass of wine per month, though no alcohol while pregnant.  Yaritza has no history of chemical dependency treatment or legal consequences of her consumption.  Mental Health: Yaritza denies any mental health history.  She specifically denies any history of suicidal ideation or hospitalization for mental health treatment.  Yaritza reports she has never been prescribed medication for her mental health.   She also reports she has never been under the care of a mental health provider.  Adjustment to Illness: Yaritza is appropriately overwhelmed with her medical status and the potentiality of having a liver transplant.  Her mother-in-law Floridalma received a  donor liver transplant a few years ago and as a result she has some familiarity with liver transplantation.  Yaritza is hopeful her condition will improve without liver transplant, though if she is recommended to have a liver transplant she accepts our team's recommendation.  Yaritza reports willingness to follow medical directives and compliance with medications and complete abstinence from alcohol lifelong.  This writer provided Yaritza  with supportive counseling throughout this interview.  This writer also encouraged Yaritza to attend the liver transplant support group for additional support and encouragement.    Impression/Recommendations:   Yaritza verbalizes understanding the psychosocial risks of transplant and teaching provided during this evaluation.  There are no contraindications to transplant from a psychosocial perspective.  Yaritza has adequate finances and health insurance for transplant, a stable living situation and identified care giver support ( Jose R, parents and three sisters).  There are no chemical or mental health concerns.  I did offer her a referral to a mental health provider for adjustment to illness counseling should she want additional support.  She politely declines this offer today.  Yaritza is an acceptable transplant candidate from a psychosocial perspective.  This writer will remain available to assist patient throughout the evaluation process and will follow patient through transplant if she is listed.  It was a pleasure to evaluate this patient for liver transplant.   Teaching completed during assessment:  1.     Housing and relocation needs post transplant.  2.     Caregiver needs post transplant.  3.     Financial issues related to transplant.  4.     Risks of alcohol use post transplant.  5.     Common psychosocial stressors pre/post transplant.          6.     Liver Transplant support group availability.          7.     [facility] Directive-form and education provided             Psychosocial Risks of Transplant Reviewed:  1.     Increased stress related to your emotional, family, social, employment, or   financial situation.  2.     Affect on work and/or disability benefits.  3.     Affect on future health and life insurance.  4.     Transplant outcome expectations may not be met.  5.     Mental Health risks: anxiety, depression, PTSD, guilt, grief and chronic fatigue.     EM Coleman   No

## 2021-10-17 ENCOUNTER — HEALTH MAINTENANCE LETTER (OUTPATIENT)
Age: 31
End: 2021-10-17

## 2021-11-05 ENCOUNTER — TELEPHONE (OUTPATIENT)
Dept: GASTROENTEROLOGY | Facility: CLINIC | Age: 31
End: 2021-11-05

## 2021-11-05 DIAGNOSIS — K75.4 AUTOIMMUNE HEPATITIS (H): Primary | ICD-10-CM

## 2021-11-05 NOTE — TELEPHONE ENCOUNTER
----- Message from Ashlee Borden MD sent at 11/4/2021  5:10 PM CDT -----  Hey - can you send standing weekly orders to [facility] for this patient? LFTS, BMP, cbc, inr? Thanks!

## 2021-12-13 ENCOUNTER — TELEPHONE (OUTPATIENT)
Dept: GASTROENTEROLOGY | Facility: CLINIC | Age: 31
End: 2021-12-13

## 2021-12-13 ENCOUNTER — VIRTUAL VISIT (OUTPATIENT)
Dept: GASTROENTEROLOGY | Facility: CLINIC | Age: 31
End: 2021-12-13
Attending: STUDENT IN AN ORGANIZED HEALTH CARE EDUCATION/TRAINING PROGRAM
Payer: COMMERCIAL

## 2021-12-13 DIAGNOSIS — R79.89 ELEVATED LFTS: ICD-10-CM

## 2021-12-13 DIAGNOSIS — K75.4 AUTOIMMUNE HEPATITIS (H): Primary | ICD-10-CM

## 2021-12-13 DIAGNOSIS — R17 ELEVATED BILIRUBIN: Primary | ICD-10-CM

## 2021-12-13 DIAGNOSIS — K75.4 AUTOIMMUNE HEPATITIS (H): ICD-10-CM

## 2021-12-13 PROCEDURE — 99214 OFFICE O/P EST MOD 30 MIN: CPT | Mod: GT | Performed by: STUDENT IN AN ORGANIZED HEALTH CARE EDUCATION/TRAINING PROGRAM

## 2021-12-13 ASSESSMENT — PAIN SCALES - GENERAL: PAINLEVEL: NO PAIN (0)

## 2021-12-13 NOTE — TELEPHONE ENCOUNTER
----- Message from Ashlee Borden MD sent at 12/13/2021 10:10 AM CST -----  Hey - can you call [facility] and ask if they can add on a differential to her CBC today? And update her future CBCs to include differentials?    Also can you fax the US order to [facility]?    Thanks!

## 2021-12-13 NOTE — LETTER
"  12/13/2021     RE: Yaritza Dias  [address]  [facility] [address]    Dear Colleague,    Thank you for referring your patient, Yaritza Dias, to the [facility] [place]. Please see a copy of my visit note below.    [facility] Return Patient Visit    Date of Visit: December 13th, 2021    Reason for referral: Follow up severe autoimmune hepatitis with necrosis    Subjective: Ms. Dias is a 31 year old woman with a history of mild LFT elevation during pregnancy, who presents for evaluation of severe auto immune hepatitis.     She presented with jaundice the end of June. Received her 2/2 COVID shots 5/2021. She had mild AST/ALT elevations during pregnancy, but normalized after pregnancy. She went to her PCP and got labs that showed   TBR 12.8 DBR 7.6 INR 1.6. She had a MRI 7/2/2021 that showed pericholecystic fluid. Also showed 4 \"masses\" in the liver - favored regenerative nodules. \"Slightly hyperintense on precontrast T1-weighted imaging, exhibits hypoechoic intensity on postcontrast imaging with enhancement of septations, exhibits diffusion restriction, and is hypointense on T2-weighted imaging. The largest mass is in the medial left hepatic lobe and is 7.7 x 6.5 x 6.2 cm. The right hepatic lobe is small. Much of the liver is in the left upper quadrant.\" Multiple liver cysts. Biliary system normal without dilation. Spleen was mildly enlarged with a few prominent belkis hepatic lymph nodes    Underwent liver bx 7/6 that showing severe hepatitis necrosis, thought to be autoimmune hepatitis, was started on prednisone 40 mg on July 14.  She had repeat labs done on July 18 showing worsening alkaline phosphatase and INR.  Patient also had hypotension upon her outpatient lab draw, upon second admission her white count was elevated concerning for sepsis, she was treated with antibiotics.  Her INR slightly went up to 2.2, patient was " transferred to [facility] for further evaluation.    While admitted to [facility], labs were stable and LFTs slowly improving. Prednisone was weaned to 30 on discharge.     Interval Events  - Ran out of prednisone about a week ago, was taking 2.5 mg daily  - Taking imuran 100 mg daily and ursodiol 300 mg BID  - She continues to feel well. Feels a lot better off steroids  - Not drinking alcohol    ROS: 14 point ROS negative except for positives noted in HPI.    PMHx:  Past Medical History:   Diagnosis Date     Gestational diabetes    Autoimmune hepatitis potentially triggered by COVID vaccination    PSHx:   Vaginal delivery  Liver bx    FamHx:  Family History   Problem Relation Age of Onset     Celiac Disease Sister    Father had coronary artery disease, sister has celiac disease, no other autoimmune disorders in family.  Mother in law had liver transplant for ALD    SocHx:  Social History     Socioeconomic History     Marital status:      Spouse name: Not on file     Number of children: Not on file     Years of education: Not on file     Highest education level: Not on file   Occupational History     Not on file   Tobacco Use     Smoking status: Never Smoker     Smokeless tobacco: Never Used   Substance and Sexual Activity     Alcohol use: Not on file     Comment: very rare social drinker     Drug use: Never     Sexual activity: Not on file   Other Topics Concern     Not on file   Social History Narrative    Lives with her , Jose R and their daughter Tesha.      Social Determinants of Health     Financial Resource Strain: Not on file   Food Insecurity: Not on file   Transportation Needs: Not on file   Physical Activity: Not on file   Stress: Not on file   Social Connections: Not on file   Intimate Partner Violence: Not on file   Housing Stability: Not on file       Medications:  Current Outpatient Medications   Medication     azaTHIOprine (IMURAN) 50 MG tablet     calcium gluconate 500 MG tablet     cholecalciferol  25 MCG (1000 UT) TABS     predniSONE (DELTASONE) 5 MG tablet     ursodiol (ACTIGALL) 300 MG capsule     No current facility-administered medications for this visit.       Allergies:  Allergies   Allergen Reactions     Penicillins GI Disturbance       Objective:  There were no vitals taken for this visit.  Constitutional: pleasant woman in NAD  Eyes: non icteric  Respiratory: Normal respiratory excursion   MSK: normal range of motion of visualized extremities  Abd: Non distended  Skin: No jaundice  Psychiatric: normal mood and orientation    Labs:  Last Comprehensive Metabolic Panel:  Sodium   Date Value Ref Range Status   08/11/2021 140 133 - 144 mmol/L Final     Potassium   Date Value Ref Range Status   08/11/2021 3.9 3.4 - 5.3 mmol/L Final     Chloride   Date Value Ref Range Status   08/11/2021 107 94 - 109 mmol/L Final     Carbon Dioxide (CO2)   Date Value Ref Range Status   08/11/2021 29 20 - 32 mmol/L Final     Anion Gap   Date Value Ref Range Status   08/11/2021 4 3 - 14 mmol/L Final     Glucose   Date Value Ref Range Status   08/11/2021 100 (H) 70 - 99 mg/dL Final     Urea Nitrogen   Date Value Ref Range Status   08/11/2021 10 7 - 30 mg/dL Final     Creatinine   Date Value Ref Range Status   08/11/2021 0.84 0.52 - 1.04 mg/dL Final     GFR Estimate   Date Value Ref Range Status   08/11/2021 >90 >60 mL/min/1.73m2 Final     Comment:     As of July 11, 2021, eGFR is calculated by the CKD-EPI creatinine equation, without race adjustment. eGFR can be influenced by muscle mass, exercise, and diet. The reported eGFR is an estimation only and is only applicable if the renal function is stable.     Calcium   Date Value Ref Range Status   08/11/2021 8.4 (L) 8.5 - 10.1 mg/dL Final     Bilirubin Total   Date Value Ref Range Status   08/11/2021 5.9 (H) 0.2 - 1.3 mg/dL Final     Alkaline Phosphatase   Date Value Ref Range Status   08/11/2021 158 (H) 40 - 150 U/L Final     ALT   Date Value Ref Range Status   08/11/2021 103  (H) 0 - 50 U/L Final     AST   Date Value Ref Range Status   08/11/2021 126 (H) 0 - 45 U/L Final       Lab Results   Component Value Date    WBC 19.4 07/26/2021     Lab Results   Component Value Date    RBC 4.39 07/26/2021     Lab Results   Component Value Date    HGB 12.7 07/26/2021     Lab Results   Component Value Date    HCT 38.6 07/26/2021     Lab Results   Component Value Date    MCV 88 07/26/2021     Lab Results   Component Value Date    MCH 28.9 07/26/2021     Lab Results   Component Value Date    MCHC 32.9 07/26/2021     Lab Results   Component Value Date    RDW 21.2 07/26/2021     Lab Results   Component Value Date     07/26/2021       INR (External)   Date Value Ref Range Status   08/25/2021 1.4 (H) 0.9 - 1.1 Final      AMA negative  ROLANDO + 1:640  F actin 9  IgG normal  Anti LKM negative  SLA negative    Hepatitis A IgG positive, IgM negative  Hepatitis B Sag negative, core negative, ab immune  TPMT normal 30.9 (22-44)    RUQ US 7/22/2021    GALLBLADDER: Not evaluated     LIVER: Partially visualized liver lesions, better evaluated on the recent MRI.       IVC: Normal where visualized.     ABDOMINAL DUPLEX: Technically limited due to overlying bowel gas and body habitus. The middle hepatic vein is not visualized. The left hepatic vein is patent with flow in the normal direction. The right hepatic vein is diminutive but patent with flow in the normal direction.. The hepatic artery, IVC, portal veins, and splenic vein are patent with flow in the normal direction.     IMPRESSION:   1.  Technically limited Doppler evaluation. The middle hepatic vein is not visualized. The left hepatic vein is patent with flow in the normal direction. The right hepatic vein is diminutive but patent with flow in the normal direction. The hepatic artery, IVC, portal veins, and splenic vein are patent with flow in the normal direction.     MRI ABD 7/3/21:    IMPRESSION:   1.  Multiple liver masses are indeterminate.  Regenerative nodules are favored. Metastatic disease is less likely given the patient's age. Atypical presentation of a primary liver mass is possible. Follow-up MRI could be considered. Biopsy could be considered.   2.  Mild splenomegaly is indeterminate. Portal hypertension could contribute to splenomegaly.   3.  Mild pericholecystic fluid is nonspecific. Acute cholecystitis is not excluded. There is no bile duct dilatation. No biliary filling defects or strictures identified.         MRI ABD 7/22/21:  IMPRESSION:   1.  Edematous gallbladder wall thickening has increased. Trace pericholecystic fluid is unchanged. Findings may indicate acalculus cholecystitis. Consider nuclear medicine hepatobiliary scan.     2.  Stable atypical appearance of the liver consistent with nonspecific heterogeneous parenchymal disease. The hepatic veins are poorly visualized. The differential diagnosis includes venoocclusive disease.     3.  Trace peripancreatic fluid has mildly increased and suggests acute pancreatitis.      Liver Bx:  LIVER, NEEDLE BIOPSY:  Hepatitis, severe, grade 4 /4 activity:   -Etiologic considerations include vaccine-induced autoimmune hepatitis vs. Other   -No fibrosis or neoplastic/mass lesion identified     Assessment  30-year-old female with no significant past medical history, who had recent diagnosis of autoimmune hepatitis.     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 Has a history of mild AST/ALT elevation in the past, may have been predisposed to AI hepatitis, which can also flare after childbirth.   Independently reviewed labs and imaging.     Assessment/Plan: Ms. Dias is a 31 year old woman with a history of mild LFT elevation during pregnancy, who presents for follow up of severe auto immune hepatitis.     Bx showing severe hepatitis with necrosis - felt to be autoimmune type hepatitis potentially triggered by COVID vaccination. She had a liver of elevated  LFTs that improved post partum, potentially predisposing her to a flare as well.     Her LFTs have been slowly improving with steroids + imuran + ursodiol. INR improving as well. Stopped prednisone 12/2021.     Started liver transplant evaluation while inpatient, given her improvement with medical therapy, closed transplant evaluation. Discussed that given the severity of her necrosis she could have scar formation related to that and could have portal HTN related to that in the future.     - Patient recently stopped prednisone  - Continue imuran 100 mg daily. Will add on differential to CBC for WBC slightly down  - Continue ursodiol 300 mg BID  - CBC, CMP, INR every 2 weeks  - Obtain Abd Us  - Will get bx after LFTs normalize    RTC 3 months.    Ashlee Borden MD MS  Hepatology/Liver Transplant  [facility]      Yaritza is a 31 year old who is being evaluated via a billable video visit.      How would you like to obtain your AVS? MyChart  If the video visit is dropped, the invitation should be resent by: Send to e-mail at: [email or web address]  Will anyone else be joining your video visit? No      Video Start Time: 9:48 AM  Video-Visit Details  Type of service:  Video Visit  Video End Time:9:57 AMOriginating Location (pt. Location): Home  Distant Location (provider location):  [facility] [place]   Platform used for Video Visit: [employer]    Again, thank you for allowing me to participate in the care of your patient.      Sincerely,    Ashlee Borden MD

## 2021-12-13 NOTE — PROGRESS NOTES
"[facility] Return Patient Visit    Date of Visit: December 13th, 2021    Reason for referral: Follow up severe autoimmune hepatitis with necrosis    Subjective: Ms. Dias is a 31 year old woman with a history of mild LFT elevation during pregnancy, who presents for evaluation of severe auto immune hepatitis.     She presented with jaundice the end of June. Received her 2/2 COVID shots 5/2021. She had mild AST/ALT elevations during pregnancy, but normalized after pregnancy. She went to her PCP and got labs that showed   TBR 12.8 DBR 7.6 INR 1.6. She had a MRI 7/2/2021 that showed pericholecystic fluid. Also showed 4 \"masses\" in the liver - favored regenerative nodules. \"Slightly hyperintense on precontrast T1-weighted imaging, exhibits hypoechoic intensity on postcontrast imaging with enhancement of septations, exhibits diffusion restriction, and is hypointense on T2-weighted imaging. The largest mass is in the medial left hepatic lobe and is 7.7 x 6.5 x 6.2 cm. The right hepatic lobe is small. Much of the liver is in the left upper quadrant.\" Multiple liver cysts. Biliary system normal without dilation. Spleen was mildly enlarged with a few prominent belkis hepatic lymph nodes    Underwent liver bx 7/6 that showing severe hepatitis necrosis, thought to be autoimmune hepatitis, was started on prednisone 40 mg on July 14.  She had repeat labs done on July 18 showing worsening alkaline phosphatase and INR.  Patient also had hypotension upon her outpatient lab draw, upon second admission her white count was elevated concerning for sepsis, she was treated with antibiotics.  Her INR slightly went up to 2.2, patient was transferred to [facility] for further evaluation.    While admitted to [facility], labs were stable and LFTs slowly improving. Prednisone was weaned to 30 on discharge.     Interval Events  - Ran out of prednisone about a week ago, was taking 2.5 mg daily  - Taking imuran 100 " mg daily and ursodiol 300 mg BID  - She continues to feel well. Feels a lot better off steroids  - Not drinking alcohol    ROS: 14 point ROS negative except for positives noted in HPI.    PMHx:  Past Medical History:   Diagnosis Date     Gestational diabetes    Autoimmune hepatitis potentially triggered by COVID vaccination    PSHx:   Vaginal delivery  Liver bx    FamHx:  Family History   Problem Relation Age of Onset     Celiac Disease Sister    Father had coronary artery disease, sister has celiac disease, no other autoimmune disorders in family.  Mother in law had liver transplant for ALD    SocHx:  Social History     Socioeconomic History     Marital status:      Spouse name: Not on file     Number of children: Not on file     Years of education: Not on file     Highest education level: Not on file   Occupational History     Not on file   Tobacco Use     Smoking status: Never Smoker     Smokeless tobacco: Never Used   Substance and Sexual Activity     Alcohol use: Not on file     Comment: very rare social drinker     Drug use: Never     Sexual activity: Not on file   Other Topics Concern     Not on file   Social History Narrative    Lives with her , Jose R and their daughter Tesha.      Social Determinants of Health     Financial Resource Strain: Not on file   Food Insecurity: Not on file   Transportation Needs: Not on file   Physical Activity: Not on file   Stress: Not on file   Social Connections: Not on file   Intimate Partner Violence: Not on file   Housing Stability: Not on file       Medications:  Current Outpatient Medications   Medication     azaTHIOprine (IMURAN) 50 MG tablet     calcium gluconate 500 MG tablet     cholecalciferol 25 MCG (1000 UT) TABS     predniSONE (DELTASONE) 5 MG tablet     ursodiol (ACTIGALL) 300 MG capsule     No current facility-administered medications for this visit.       Allergies:  Allergies   Allergen Reactions     Penicillins GI Disturbance        Objective:  There were no vitals taken for this visit.  Constitutional: pleasant woman in NAD  Eyes: non icteric  Respiratory: Normal respiratory excursion   MSK: normal range of motion of visualized extremities  Abd: Non distended  Skin: No jaundice  Psychiatric: normal mood and orientation    Labs:  Last Comprehensive Metabolic Panel:  Sodium   Date Value Ref Range Status   08/11/2021 140 133 - 144 mmol/L Final     Potassium   Date Value Ref Range Status   08/11/2021 3.9 3.4 - 5.3 mmol/L Final     Chloride   Date Value Ref Range Status   08/11/2021 107 94 - 109 mmol/L Final     Carbon Dioxide (CO2)   Date Value Ref Range Status   08/11/2021 29 20 - 32 mmol/L Final     Anion Gap   Date Value Ref Range Status   08/11/2021 4 3 - 14 mmol/L Final     Glucose   Date Value Ref Range Status   08/11/2021 100 (H) 70 - 99 mg/dL Final     Urea Nitrogen   Date Value Ref Range Status   08/11/2021 10 7 - 30 mg/dL Final     Creatinine   Date Value Ref Range Status   08/11/2021 0.84 0.52 - 1.04 mg/dL Final     GFR Estimate   Date Value Ref Range Status   08/11/2021 >90 >60 mL/min/1.73m2 Final     Comment:     As of July 11, 2021, eGFR is calculated by the CKD-EPI creatinine equation, without race adjustment. eGFR can be influenced by muscle mass, exercise, and diet. The reported eGFR is an estimation only and is only applicable if the renal function is stable.     Calcium   Date Value Ref Range Status   08/11/2021 8.4 (L) 8.5 - 10.1 mg/dL Final     Bilirubin Total   Date Value Ref Range Status   08/11/2021 5.9 (H) 0.2 - 1.3 mg/dL Final     Alkaline Phosphatase   Date Value Ref Range Status   08/11/2021 158 (H) 40 - 150 U/L Final     ALT   Date Value Ref Range Status   08/11/2021 103 (H) 0 - 50 U/L Final     AST   Date Value Ref Range Status   08/11/2021 126 (H) 0 - 45 U/L Final       Lab Results   Component Value Date    WBC 19.4 07/26/2021     Lab Results   Component Value Date    RBC 4.39 07/26/2021     Lab Results    Component Value Date    HGB 12.7 07/26/2021     Lab Results   Component Value Date    HCT 38.6 07/26/2021     Lab Results   Component Value Date    MCV 88 07/26/2021     Lab Results   Component Value Date    MCH 28.9 07/26/2021     Lab Results   Component Value Date    MCHC 32.9 07/26/2021     Lab Results   Component Value Date    RDW 21.2 07/26/2021     Lab Results   Component Value Date     07/26/2021       INR (External)   Date Value Ref Range Status   08/25/2021 1.4 (H) 0.9 - 1.1 Final      AMA negative  ROLANDO + 1:640  F actin 9  IgG normal  Anti LKM negative  SLA negative    Hepatitis A IgG positive, IgM negative  Hepatitis B Sag negative, core negative, ab immune  TPMT normal 30.9 (22-44)    RUQ US 7/22/2021    GALLBLADDER: Not evaluated     LIVER: Partially visualized liver lesions, better evaluated on the recent MRI.       IVC: Normal where visualized.     ABDOMINAL DUPLEX: Technically limited due to overlying bowel gas and body habitus. The middle hepatic vein is not visualized. The left hepatic vein is patent with flow in the normal direction. The right hepatic vein is diminutive but patent with flow in the normal direction.. The hepatic artery, IVC, portal veins, and splenic vein are patent with flow in the normal direction.     IMPRESSION:   1.  Technically limited Doppler evaluation. The middle hepatic vein is not visualized. The left hepatic vein is patent with flow in the normal direction. The right hepatic vein is diminutive but patent with flow in the normal direction. The hepatic artery, IVC, portal veins, and splenic vein are patent with flow in the normal direction.     MRI ABD 7/3/21:    IMPRESSION:   1.  Multiple liver masses are indeterminate. Regenerative nodules are favored. Metastatic disease is less likely given the patient's age. Atypical presentation of a primary liver mass is possible. Follow-up MRI could be considered. Biopsy could be considered.   2.  Mild splenomegaly is  indeterminate. Portal hypertension could contribute to splenomegaly.   3.  Mild pericholecystic fluid is nonspecific. Acute cholecystitis is not excluded. There is no bile duct dilatation. No biliary filling defects or strictures identified.         MRI ABD 7/22/21:  IMPRESSION:   1.  Edematous gallbladder wall thickening has increased. Trace pericholecystic fluid is unchanged. Findings may indicate acalculus cholecystitis. Consider nuclear medicine hepatobiliary scan.     2.  Stable atypical appearance of the liver consistent with nonspecific heterogeneous parenchymal disease. The hepatic veins are poorly visualized. The differential diagnosis includes venoocclusive disease.     3.  Trace peripancreatic fluid has mildly increased and suggests acute pancreatitis.      Liver Bx:  LIVER, NEEDLE BIOPSY:  Hepatitis, severe, grade 4 /4 activity:   -Etiologic considerations include vaccine-induced autoimmune hepatitis vs. Other   -No fibrosis or neoplastic/mass lesion identified     Assessment  30-year-old female with no significant past medical history, who had recent diagnosis of autoimmune hepatitis.     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 Has a history of mild AST/ALT elevation in the past, may have been predisposed to AI hepatitis, which can also flare after childbirth.   Independently reviewed labs and imaging.     Assessment/Plan: Ms. Dias is a 31 year old woman with a history of mild LFT elevation during pregnancy, who presents for follow up of severe auto immune hepatitis.     Bx showing severe hepatitis with necrosis - felt to be autoimmune type hepatitis potentially triggered by COVID vaccination. She had a liver of elevated LFTs that improved post partum, potentially predisposing her to a flare as well.     Her LFTs have been slowly improving with steroids + imuran + ursodiol. INR improving as well. Stopped prednisone 12/2021.     Started liver transplant  evaluation while inpatient, given her improvement with medical therapy, closed transplant evaluation. Discussed that given the severity of her necrosis she could have scar formation related to that and could have portal HTN related to that in the future.     - Patient recently stopped prednisone  - Continue imuran 100 mg daily. Will add on differential to CBC for WBC slightly down  - Continue ursodiol 300 mg BID  - CBC, CMP, INR every 2 weeks  - Obtain Abd Us  - Will get bx after LFTs normalize    RTC 3 months.    Ashlee Borden MD MS  Hepatology/Liver Transplant  [facility]      Yaritza is a 31 year old who is being evaluated via a billable video visit.      How would you like to obtain your AVS? MyChart  If the video visit is dropped, the invitation should be resent by: Send to e-mail at: [email or web address]  Will anyone else be joining your video visit? No      Video Start Time: 9:48 AM  Video-Visit Details    Type of service:  Video Visit    Video End Time:9:57 AM    Originating Location (pt. Location): Home    Distant Location (provider location):  [facility] [place]     Platform used for Video Visit: avolution

## 2021-12-13 NOTE — TELEPHONE ENCOUNTER
Call to  lab [facility] location. Differential can be added to today's CBC,  asked that updated order be faxed. Order faxed to number provided by . Also, faxed ultrasound order to general fax number given by call center. Asked that  imaging scheduler reach out to schedule ultrasound.    Shana TIWARI LPN  Hepatology Clinic

## 2022-01-07 ENCOUNTER — TELEPHONE (OUTPATIENT)
Dept: GASTROENTEROLOGY | Facility: CLINIC | Age: 32
End: 2022-01-07
Payer: COMMERCIAL

## 2022-01-07 NOTE — TELEPHONE ENCOUNTER
[facility] Call Center    Phone Message    May a detailed message be left on voicemail: yes     Reason for Call: Other: Zahida at [facility] called as patient has an appt on Tuesday, 1/11/22, for the Ultrasound.  Zahida needs to verify if the US should be dopplar or limited, based on what she is coming in for.  Please follow up with them at [phone].  Thank you.    Action Taken: Message routed to:  [facility]    Travel Screening: Not Applicable

## 2022-01-07 NOTE — TELEPHONE ENCOUNTER
Call back to Zahida with [facility]. Per Dr. Borden limited is ok. Zahida voiced understanding.    Shana TIWARI LPN  Hepatology Clinic

## 2022-01-10 DIAGNOSIS — K75.4 AUTOIMMUNE HEPATITIS (H): ICD-10-CM

## 2022-01-10 RX ORDER — AZATHIOPRINE 75 MG/1
100 TABLET ORAL DAILY
Qty: 90 TABLET | Refills: 3 | Status: SHIPPED | OUTPATIENT
Start: 2022-01-10 | End: 2023-02-21

## 2022-01-17 DIAGNOSIS — R17 ELEVATED BILIRUBIN: ICD-10-CM

## 2022-01-17 DIAGNOSIS — K75.4 AUTOIMMUNE HEPATITIS (H): Primary | ICD-10-CM

## 2022-02-04 ENCOUNTER — TELEPHONE (OUTPATIENT)
Dept: GASTROENTEROLOGY | Facility: CLINIC | Age: 32
End: 2022-02-04
Payer: COMMERCIAL

## 2022-02-19 ENCOUNTER — ANCILLARY PROCEDURE (OUTPATIENT)
Dept: MRI IMAGING | Facility: CLINIC | Age: 32
End: 2022-02-19
Attending: STUDENT IN AN ORGANIZED HEALTH CARE EDUCATION/TRAINING PROGRAM
Payer: COMMERCIAL

## 2022-02-19 DIAGNOSIS — R17 ELEVATED BILIRUBIN: ICD-10-CM

## 2022-02-19 DIAGNOSIS — K75.4 AUTOIMMUNE HEPATITIS (H): ICD-10-CM

## 2022-02-19 PROCEDURE — A9585 GADOBUTROL INJECTION: HCPCS | Performed by: RADIOLOGY

## 2022-02-19 PROCEDURE — 74183 MRI ABD W/O CNTR FLWD CNTR: CPT | Performed by: RADIOLOGY

## 2022-02-19 RX ORDER — GADOBUTROL 604.72 MG/ML
10 INJECTION INTRAVENOUS ONCE
Status: COMPLETED | OUTPATIENT
Start: 2022-02-19 | End: 2022-02-19

## 2022-02-19 RX ADMIN — GADOBUTROL 10 ML: 604.72 INJECTION INTRAVENOUS at 10:11

## 2022-02-19 NOTE — DISCHARGE INSTRUCTIONS
MRI Contrast Discharge Instructions    The IV contrast you received today will pass out of your body in your  urine. This will happen in the next 24 hours. You will not feel this process.  Your urine will not change color.    Drink at least 4 extra glasses of water or juice today (unless your doctor  has restricted your fluids). This reduces the stress on your kidneys.  You may take your regular medicines.    If you are on dialysis: It is best to have dialysis today.    If you have a reaction: Most reactions happen right away. If you have  any new symptoms after leaving the hospital (such as hives or swelling),  call your hospital at the correct number below. Or call your family doctor.  If you have breathing distress or wheezing, call 911.    Special instructions: ***    I have read and understand the above information.    Signature:______________________________________ Date:___________    Staff:__________________________________________ Date:___________     Time:__________    [place] [facility] Departments:    ___[place]: [phone]  ___[facility]: [phone]  ___[place]: [phone] ___[facility]: [phone]  ___[facility]: [phone]  ___[facility]: [phone]  ___[phone]  ___[facility]: [phone]  ___[phone]

## 2022-02-21 DIAGNOSIS — R79.89 ELEVATED LFTS: Primary | ICD-10-CM

## 2022-02-21 NOTE — PROGRESS NOTES
Liver biopsy order entered. Message sent to IR to schedule appointment.    Shana TIWARI LPN  Hepatology Clinic    --------------  Ashlee Borden MD [place], Shannon, LPN Hey - can you put in an order for a random liver bx for this patient? Thank you

## 2022-03-01 ENCOUNTER — VIRTUAL VISIT (OUTPATIENT)
Dept: GASTROENTEROLOGY | Facility: CLINIC | Age: 32
End: 2022-03-01
Attending: STUDENT IN AN ORGANIZED HEALTH CARE EDUCATION/TRAINING PROGRAM
Payer: COMMERCIAL

## 2022-03-01 DIAGNOSIS — R79.89 ELEVATED LFTS: ICD-10-CM

## 2022-03-01 DIAGNOSIS — K75.4 AUTOIMMUNE HEPATITIS (H): Primary | ICD-10-CM

## 2022-03-01 DIAGNOSIS — Z11.59 ENCOUNTER FOR SCREENING FOR OTHER VIRAL DISEASES: Primary | ICD-10-CM

## 2022-03-01 PROCEDURE — 99214 OFFICE O/P EST MOD 30 MIN: CPT | Mod: GT | Performed by: STUDENT IN AN ORGANIZED HEALTH CARE EDUCATION/TRAINING PROGRAM

## 2022-03-01 RX ORDER — FERROUS SULFATE 325(65) MG
TABLET ORAL
Status: ON HOLD | COMMUNITY
Start: 2021-03-22 | End: 2022-03-18

## 2022-03-01 NOTE — PROGRESS NOTES
"Yaritza is a 31 year old who is being evaluated via a billable video visit.  .    How would you like to obtain your AVS? MyChart  If the video visit is dropped, the invitation should be resent by: Text to cell phone: [phone]  Will anyone else be joining your video visit? No      Video Start Time: 9:50 AM  Video-Visit Details    Type of service:  Video Visit    Video End Time: 10:06 AM    Originating Location (pt. Location): Home    Distant Location (provider location):  [facility] [place]     Platform used for Video Visit: Alhaji Jacome MA on 3/1/2022 at 9:22 AM    [facility] Return Patient Visit    Date of Visit: March 1st, 2022    Reason for referral: Follow up severe autoimmune hepatitis with necrosis    Subjective: Ms. Dias is a 31 year old woman with a history of mild LFT elevation during pregnancy, who presents for evaluation of severe auto immune hepatitis.     She presented with jaundice the end of June. Received her 2/2 COVID shots 5/2021. She had mild AST/ALT elevations during pregnancy, but normalized after pregnancy. She went to her PCP and got labs that showed   TBR 12.8 DBR 7.6 INR 1.6. She had a MRI 7/2/2021 that showed pericholecystic fluid. Also showed 4 \"masses\" in the liver - favored regenerative nodules. \"Slightly hyperintense on precontrast T1-weighted imaging, exhibits hypoechoic intensity on postcontrast imaging with enhancement of septations, exhibits diffusion restriction, and is hypointense on T2-weighted imaging. The largest mass is in the medial left hepatic lobe and is 7.7 x 6.5 x 6.2 cm. The right hepatic lobe is small. Much of the liver is in the left upper quadrant.\" Multiple liver cysts. Biliary system normal without dilation. Spleen was mildly enlarged with a few prominent belkis hepatic lymph nodes    Underwent liver bx 7/6 that showing severe hepatitis necrosis, thought to be autoimmune " hepatitis, was started on prednisone 40 mg on July 14.  She had repeat labs done on July 18 showing worsening alkaline phosphatase and INR.  Patient also had hypotension upon her outpatient lab draw, upon second admission her white count was elevated concerning for sepsis, she was treated with antibiotics.  Her INR slightly went up to 2.2, patient was transferred to [facility] for further evaluation.    While admitted to [facility], labs were stable and LFTs slowly improving. Weaned of steroids 12/2021    Interval Events  - MRCP showing lobular appearing liver and enlarged spleen - relatively unchanged since her last MRI  - Taking imuran 75 mg daily and ursodiol 300 mg BID  - Not drinking alcohol, feel well    ROS: 14 point ROS negative except for positives noted in HPI.    PMHx:  Past Medical History:   Diagnosis Date     Gestational diabetes    Autoimmune hepatitis potentially triggered by COVID vaccination    PSHx:   Vaginal delivery  Liver bx    FamHx:  Family History   Problem Relation Age of Onset     Celiac Disease Sister    Father had coronary artery disease, sister has celiac disease, no other autoimmune disorders in family.  Mother in law had liver transplant for ALD    SocHx:  Social History     Socioeconomic History     Marital status:      Spouse name: Not on file     Number of children: Not on file     Years of education: Not on file     Highest education level: Not on file   Occupational History     Not on file   Tobacco Use     Smoking status: Never Smoker     Smokeless tobacco: Never Used   Substance and Sexual Activity     Alcohol use: Not Currently     Drug use: Never     Sexual activity: Not on file   Other Topics Concern     Not on file   Social History Narrative    Lives with her , Jose R and their daughter Tesha.      Social Determinants of Health     Financial Resource Strain: Not on file   Food Insecurity: Not on file   Transportation Needs: Not on file   Physical Activity: Not on file    Stress: Not on file   Social Connections: Not on file   Intimate Partner Violence: Not on file   Housing Stability: Not on file       Medications:  Current Outpatient Medications   Medication     azaTHIOprine 75 MG TABS     calcium gluconate 500 MG tablet     cholecalciferol 25 MCG (1000 UT) TABS     ferrous sulfate (FEROSUL) 325 (65 Fe) MG tablet     ursodiol (ACTIGALL) 300 MG capsule     No current facility-administered medications for this visit.       Allergies:  Allergies   Allergen Reactions     Penicillins GI Disturbance       Objective:  There were no vitals taken for this visit.  Constitutional: pleasant woman in NAD  Eyes: non icteric  Respiratory: Normal respiratory excursion   MSK: normal range of motion of visualized extremities  Abd: Non distended  Skin: No jaundice  Psychiatric: normal mood and orientation    Labs: reviewed in EHR         AMA negative  ROLANDO + 1:640  F actin 9  IgG normal  Anti LKM negative  SLA negative    Hepatitis A IgG positive, IgM negative  Hepatitis B Sag negative, core negative, ab immune  TPMT normal 30.9 (22-44)    MRCP 2/19/2022    Liver: Architectural distortion of the liver parenchyma, lobulated  with nodular contours, capsular retraction, atrophic right lobe,  elongated left hepatic lobe, scattered liver cysts and nodular areas  of abnormal decreased enhancement in the arterial, portal venous and  delayed phases. Nodular foci demonstrate mild increased precontrast T1  signal, decreased T2 signal and no restricted diffusion in the present  study. Mild prominent intrahepatic biliary tree dilation. Unremarkable  common bile duct.     Gallbladder: No cholelithiasis. No gallbladder wall thickening. No  pericholecystic fluid.     Spleen: Splenule. Enlarged spleen measuring 16 cm in craniocaudad  dimension. No focal lesions.     Kidneys: No focal lesions. No hydronephrosis.     Adrenal glands: No nodules.     Pancreas: Preserved increased precontrast T1 signal. Main  pancreatic  duct is not dilated. No focal lesions.     Bowel: No dilated loops of bowel.     Lymph nodes: Prominent belkis hepatis lymph nodes, likely reactive.  Additional prominent retroperitoneal and mesenteric lymph nodes,  stable.     Blood vessels: Patent liver vasculature. Patent abdominal vasculature.  No abdominal aortic aneurysm.     Lung bases: Unremarkable.     Bones and soft tissues: No aggressive bone lesions.     Mesentery and abdominal wall: Unremarkable.     Ascites: No ascites.     IMPRESSION: In this patient with history of altered immune hepatitis  and biopsy-proven hepatic parenchyma necrosis:  1. Lobulated nodular liver parenchyma with capsular retraction and  nodular areas of hypoenhancement, grossly stable from 7/3/2021 with no  convincing acute MRI findings on the present study.  2. Splenomegaly. No ascites.  3. Additional incidental findings as described above.    RUQ US 7/22/2021    GALLBLADDER: Not evaluated     LIVER: Partially visualized liver lesions, better evaluated on the recent MRI.       IVC: Normal where visualized.     ABDOMINAL DUPLEX: Technically limited due to overlying bowel gas and body habitus. The middle hepatic vein is not visualized. The left hepatic vein is patent with flow in the normal direction. The right hepatic vein is diminutive but patent with flow in the normal direction.. The hepatic artery, IVC, portal veins, and splenic vein are patent with flow in the normal direction.     IMPRESSION:   1.  Technically limited Doppler evaluation. The middle hepatic vein is not visualized. The left hepatic vein is patent with flow in the normal direction. The right hepatic vein is diminutive but patent with flow in the normal direction. The hepatic artery, IVC, portal veins, and splenic vein are patent with flow in the normal direction.     MRI ABD 7/3/21:    IMPRESSION:   1.  Multiple liver masses are indeterminate. Regenerative nodules are favored. Metastatic disease is  less likely given the patient's age. Atypical presentation of a primary liver mass is possible. Follow-up MRI could be considered. Biopsy could be considered.   2.  Mild splenomegaly is indeterminate. Portal hypertension could contribute to splenomegaly.   3.  Mild pericholecystic fluid is nonspecific. Acute cholecystitis is not excluded. There is no bile duct dilatation. No biliary filling defects or strictures identified.         MRI ABD 7/22/21:  IMPRESSION:   1.  Edematous gallbladder wall thickening has increased. Trace pericholecystic fluid is unchanged. Findings may indicate acalculus cholecystitis. Consider nuclear medicine hepatobiliary scan.     2.  Stable atypical appearance of the liver consistent with nonspecific heterogeneous parenchymal disease. The hepatic veins are poorly visualized. The differential diagnosis includes venoocclusive disease.     3.  Trace peripancreatic fluid has mildly increased and suggests acute pancreatitis.      Liver Bx:  LIVER, NEEDLE BIOPSY:  Hepatitis, severe, grade 4 /4 activity:   -Etiologic considerations include vaccine-induced autoimmune hepatitis vs. Other   -No fibrosis or neoplastic/mass lesion identified     Assessment  30-year-old female with no significant past medical history, who had recent diagnosis of autoimmune hepatitis.     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 Has a history of mild AST/ALT elevation in the past, may have been predisposed to AI hepatitis, which can also flare after childbirth.   Independently reviewed labs and imaging.     Assessment/Plan: Ms. Dias is a 31 year old woman with a history of mild LFT elevation during pregnancy, who presents for follow up of severe auto immune hepatitis.     Bx showing severe hepatitis with necrosis - felt to be autoimmune type hepatitis potentially triggered by COVID vaccination. She had a liver of elevated LFTs that improved post partum, potentially predisposing  her to a flare as well.     Her LFTs have been slowly improving with steroids + imuran + ursodiol. INR improving as well. Stopped prednisone 12/2021.     Started liver transplant evaluation while inpatient, given her improvement with medical therapy, closed transplant evaluation. Discussed that given the severity of her necrosis she could have scar formation related to that and could have portal HTN related to that in the future.     MRCP showing lobular appearing liver, similar to last MRI. Recommend liver bx to assess disease activity (AST/ALT normal) and fibrosis. Imuran decreased to 75 mg daily 2/2 leukopenia, LFTs stable on this. If she has active disease on bx, will resume prednisone    - Continue imuran 75 mg daily  - Continue ursodiol 300 mg BID  - CBC, CMP, INR every month  - Liver bx ordered    RTC 3 months.    Ashlee Borden MD MS  Hepatology/Liver Transplant  [facility]

## 2022-03-01 NOTE — LETTER
"  3/1/2022     RE: Yaritza Dias  [address] [place] [facility] [address]    Dear Colleague,    Thank you for referring your patient, Yaritza Dias, to the [facility] [place]. Please see a copy of my visit note below.    Yaritza is a 31 year old who is being evaluated via a billable video visit.  .    How would you like to obtain your AVS? MyChart  If the video visit is dropped, the invitation should be resent by: Text to cell phone: [phone]  Will anyone else be joining your video visit? No      Video Start Time: 9:50 AM  Video-Visit Details    Type of service:  Video Visit    Video End Time: 10:06 AM    Originating Location (pt. Location): Home    Distant Location (provider location):  [facility] [place]     Platform used for Video Visit: Alhaji Jacome MA on 3/1/2022 at 9:22 AM    [facility] Return Patient Visit    Date of Visit: March 1st, 2022    Reason for referral: Follow up severe autoimmune hepatitis with necrosis    Subjective: Ms. Dias is a 31 year old woman with a history of mild LFT elevation during pregnancy, who presents for evaluation of severe auto immune hepatitis.     She presented with jaundice the end of June. Received her 2/2 COVID shots 5/2021. She had mild AST/ALT elevations during pregnancy, but normalized after pregnancy. She went to her PCP and got labs that showed   TBR 12.8 DBR 7.6 INR 1.6. She had a MRI 7/2/2021 that showed pericholecystic fluid. Also showed 4 \"masses\" in the liver - favored regenerative nodules. \"Slightly hyperintense on precontrast T1-weighted imaging, exhibits hypoechoic intensity on postcontrast imaging with enhancement of septations, exhibits diffusion restriction, and is hypointense on T2-weighted imaging. The largest mass is in the medial left hepatic lobe and is 7.7 x 6.5 x 6.2 cm. The right hepatic lobe is small. Much of the liver is in the " "left upper quadrant.\" Multiple liver cysts. Biliary system normal without dilation. Spleen was mildly enlarged with a few prominent belkis hepatic lymph nodes    Underwent liver bx 7/6 that showing severe hepatitis necrosis, thought to be autoimmune hepatitis, was started on prednisone 40 mg on July 14.  She had repeat labs done on July 18 showing worsening alkaline phosphatase and INR.  Patient also had hypotension upon her outpatient lab draw, upon second admission her white count was elevated concerning for sepsis, she was treated with antibiotics.  Her INR slightly went up to 2.2, patient was transferred to [facility] for further evaluation.    While admitted to [facility], labs were stable and LFTs slowly improving. Weaned of steroids 12/2021    Interval Events  - MRCP showing lobular appearing liver and enlarged spleen - relatively unchanged since her last MRI  - Taking imuran 75 mg daily and ursodiol 300 mg BID  - Not drinking alcohol, feel well    ROS: 14 point ROS negative except for positives noted in HPI.    PMHx:  Past Medical History:   Diagnosis Date     Gestational diabetes    Autoimmune hepatitis potentially triggered by COVID vaccination    PSHx:   Vaginal delivery  Liver bx    FamHx:  Family History   Problem Relation Age of Onset     Celiac Disease Sister    Father had coronary artery disease, sister has celiac disease, no other autoimmune disorders in family.  Mother in law had liver transplant for ALD    SocHx:  Social History     Socioeconomic History     Marital status:      Spouse name: Not on file     Number of children: Not on file     Years of education: Not on file     Highest education level: Not on file   Occupational History     Not on file   Tobacco Use     Smoking status: Never Smoker     Smokeless tobacco: Never Used   Substance and Sexual Activity     Alcohol use: Not Currently     Drug use: Never     Sexual activity: Not on file   Other Topics Concern     Not on file   Social History " Narrative    Lives with her , Jose R and their daughter Tesha.      Social Determinants of Health     Financial Resource Strain: Not on file   Food Insecurity: Not on file   Transportation Needs: Not on file   Physical Activity: Not on file   Stress: Not on file   Social Connections: Not on file   Intimate Partner Violence: Not on file   Housing Stability: Not on file       Medications:  Current Outpatient Medications   Medication     azaTHIOprine 75 MG TABS     calcium gluconate 500 MG tablet     cholecalciferol 25 MCG (1000 UT) TABS     ferrous sulfate (FEROSUL) 325 (65 Fe) MG tablet     ursodiol (ACTIGALL) 300 MG capsule     No current facility-administered medications for this visit.       Allergies:  Allergies   Allergen Reactions     Penicillins GI Disturbance       Objective:  There were no vitals taken for this visit.  Constitutional: pleasant woman in NAD  Eyes: non icteric  Respiratory: Normal respiratory excursion   MSK: normal range of motion of visualized extremities  Abd: Non distended  Skin: No jaundice  Psychiatric: normal mood and orientation    Labs: reviewed in EHR         AMA negative  ROLANDO + 1:640  F actin 9  IgG normal  Anti LKM negative  SLA negative    Hepatitis A IgG positive, IgM negative  Hepatitis B Sag negative, core negative, ab immune  TPMT normal 30.9 (22-44)    MRCP 2/19/2022    Liver: Architectural distortion of the liver parenchyma, lobulated  with nodular contours, capsular retraction, atrophic right lobe,  elongated left hepatic lobe, scattered liver cysts and nodular areas  of abnormal decreased enhancement in the arterial, portal venous and  delayed phases. Nodular foci demonstrate mild increased precontrast T1  signal, decreased T2 signal and no restricted diffusion in the present  study. Mild prominent intrahepatic biliary tree dilation. Unremarkable  common bile duct.     Gallbladder: No cholelithiasis. No gallbladder wall thickening. No  pericholecystic  fluid.     Spleen: Splenule. Enlarged spleen measuring 16 cm in craniocaudad  dimension. No focal lesions.     Kidneys: No focal lesions. No hydronephrosis.     Adrenal glands: No nodules.     Pancreas: Preserved increased precontrast T1 signal. Main pancreatic  duct is not dilated. No focal lesions.     Bowel: No dilated loops of bowel.     Lymph nodes: Prominent belkis hepatis lymph nodes, likely reactive.  Additional prominent retroperitoneal and mesenteric lymph nodes,  stable.     Blood vessels: Patent liver vasculature. Patent abdominal vasculature.  No abdominal aortic aneurysm.     Lung bases: Unremarkable.     Bones and soft tissues: No aggressive bone lesions.     Mesentery and abdominal wall: Unremarkable.     Ascites: No ascites.     IMPRESSION: In this patient with history of altered immune hepatitis  and biopsy-proven hepatic parenchyma necrosis:  1. Lobulated nodular liver parenchyma with capsular retraction and  nodular areas of hypoenhancement, grossly stable from 7/3/2021 with no  convincing acute MRI findings on the present study.  2. Splenomegaly. No ascites.  3. Additional incidental findings as described above.    RUQ US 7/22/2021    GALLBLADDER: Not evaluated     LIVER: Partially visualized liver lesions, better evaluated on the recent MRI.       IVC: Normal where visualized.     ABDOMINAL DUPLEX: Technically limited due to overlying bowel gas and body habitus. The middle hepatic vein is not visualized. The left hepatic vein is patent with flow in the normal direction. The right hepatic vein is diminutive but patent with flow in the normal direction.. The hepatic artery, IVC, portal veins, and splenic vein are patent with flow in the normal direction.     IMPRESSION:   1.  Technically limited Doppler evaluation. The middle hepatic vein is not visualized. The left hepatic vein is patent with flow in the normal direction. The right hepatic vein is diminutive but patent with flow in the normal  direction. The hepatic artery, IVC, portal veins, and splenic vein are patent with flow in the normal direction.     MRI ABD 7/3/21:    IMPRESSION:   1.  Multiple liver masses are indeterminate. Regenerative nodules are favored. Metastatic disease is less likely given the patient's age. Atypical presentation of a primary liver mass is possible. Follow-up MRI could be considered. Biopsy could be considered.   2.  Mild splenomegaly is indeterminate. Portal hypertension could contribute to splenomegaly.   3.  Mild pericholecystic fluid is nonspecific. Acute cholecystitis is not excluded. There is no bile duct dilatation. No biliary filling defects or strictures identified.         MRI ABD 7/22/21:  IMPRESSION:   1.  Edematous gallbladder wall thickening has increased. Trace pericholecystic fluid is unchanged. Findings may indicate acalculus cholecystitis. Consider nuclear medicine hepatobiliary scan.     2.  Stable atypical appearance of the liver consistent with nonspecific heterogeneous parenchymal disease. The hepatic veins are poorly visualized. The differential diagnosis includes venoocclusive disease.     3.  Trace peripancreatic fluid has mildly increased and suggests acute pancreatitis.      Liver Bx:  LIVER, NEEDLE BIOPSY:  Hepatitis, severe, grade 4 /4 activity:   -Etiologic considerations include vaccine-induced autoimmune hepatitis vs. Other   -No fibrosis or neoplastic/mass lesion identified     Assessment  30-year-old female with no significant past medical history, who had recent diagnosis of autoimmune hepatitis.     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 Has a history of mild AST/ALT elevation in the past, may have been predisposed to AI hepatitis, which can also flare after childbirth.   Independently reviewed labs and imaging.     Assessment/Plan: Ms. Dias is a 31 year old woman with a history of mild LFT elevation during pregnancy, who presents for follow  up of severe auto immune hepatitis.     Bx showing severe hepatitis with necrosis - felt to be autoimmune type hepatitis potentially triggered by COVID vaccination. She had a liver of elevated LFTs that improved post partum, potentially predisposing her to a flare as well.     Her LFTs have been slowly improving with steroids + imuran + ursodiol. INR improving as well. Stopped prednisone 12/2021.     Started liver transplant evaluation while inpatient, given her improvement with medical therapy, closed transplant evaluation. Discussed that given the severity of her necrosis she could have scar formation related to that and could have portal HTN related to that in the future.     MRCP showing lobular appearing liver, similar to last MRI. Recommend liver bx to assess disease activity (AST/ALT normal) and fibrosis. Imuran decreased to 75 mg daily 2/2 leukopenia, LFTs stable on this. If she has active disease on bx, will resume prednisone    - Continue imuran 75 mg daily  - Continue ursodiol 300 mg BID  - CBC, CMP, INR every month  - Liver bx ordered    RTC 3 months.    Ashlee Borden MD MS  Hepatology/Liver Transplant  [facility]

## 2022-03-01 NOTE — PROGRESS NOTES
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 Dr. Borden recommends liver bx to assess disease activity (AST/ALT normal) and fibrosis. If she has active disease on bx, will resume prednisone. IR has been asked to biopsy the most normal appearing liver tissue or random liver for ongoing therapy by Dr. Michi NIELSEN.    Liver Bx: 7/6/21  LIVER, NEEDLE BIOPSY: left liver mass at [facility]  Hepatitis, severe, grade 4 /4 activity:   -Etiologic considerations include vaccine-induced autoimmune hepatitis vs. Other   -No fibrosis or neoplastic/mass lesion identified       MRCP 2/19/2022    IMPRESSION: In this patient with history of altered immune hepatitis  and biopsy-proven hepatic parenchyma necrosis:  1. Lobulated nodular liver parenchyma with capsular retraction and  nodular areas of hypoenhancement, grossly stable from 7/3/2021 with no  convincing acute MRI findings on the present study.  2. Splenomegaly. No ascites.    Case and imaging was reviewed with Dr. Plata and Dr. Longoria from IR and biopsy of the most normal appearing tissue for a random liver biopsy is approved. IR providers choice of tissue sample.     Procedure order, surgical pathology orders placed.    If requesting team would like samples sent for anything else please enter them or notify IR prior to scheduled procedure.    Primary team Dr. Michi NIELSEN made aware of IR recommendations via epic messaging.      CATRACHITO Carrington CNP  Interventional Radiology   IR on-call pager: [phone]

## 2022-03-14 ENCOUNTER — LAB (OUTPATIENT)
Dept: LAB | Facility: CLINIC | Age: 32
End: 2022-03-14
Attending: STUDENT IN AN ORGANIZED HEALTH CARE EDUCATION/TRAINING PROGRAM
Payer: COMMERCIAL

## 2022-03-14 DIAGNOSIS — Z11.59 ENCOUNTER FOR SCREENING FOR OTHER VIRAL DISEASES: ICD-10-CM

## 2022-03-14 PROCEDURE — U0005 INFEC AGEN DETEC AMPLI PROBE: HCPCS

## 2022-03-14 PROCEDURE — U0003 INFECTIOUS AGENT DETECTION BY NUCLEIC ACID (DNA OR RNA); SEVERE ACUTE RESPIRATORY SYNDROME CORONAVIRUS 2 (SARS-COV-2) (CORONAVIRUS DISEASE [COVID-19]), AMPLIFIED PROBE TECHNIQUE, MAKING USE OF HIGH THROUGHPUT TECHNOLOGIES AS DESCRIBED BY CMS-2020-01-R: HCPCS

## 2022-03-15 LAB — SARS-COV-2 RNA RESP QL NAA+PROBE: NEGATIVE

## 2022-03-18 ENCOUNTER — APPOINTMENT (OUTPATIENT)
Dept: INTERVENTIONAL RADIOLOGY/VASCULAR | Facility: CLINIC | Age: 32
End: 2022-03-18
Attending: STUDENT IN AN ORGANIZED HEALTH CARE EDUCATION/TRAINING PROGRAM
Payer: COMMERCIAL

## 2022-03-18 ENCOUNTER — APPOINTMENT (OUTPATIENT)
Dept: MEDSURG UNIT | Facility: CLINIC | Age: 32
End: 2022-03-18
Attending: STUDENT IN AN ORGANIZED HEALTH CARE EDUCATION/TRAINING PROGRAM
Payer: COMMERCIAL

## 2022-03-18 ENCOUNTER — HOSPITAL ENCOUNTER (OUTPATIENT)
Facility: CLINIC | Age: 32
Discharge: HOME | End: 2022-03-18
Attending: STUDENT IN AN ORGANIZED HEALTH CARE EDUCATION/TRAINING PROGRAM | Admitting: PHYSICIAN ASSISTANT
Payer: COMMERCIAL

## 2022-03-18 VITALS
TEMPERATURE: 98.3 F | OXYGEN SATURATION: 100 % | RESPIRATION RATE: 16 BRPM | SYSTOLIC BLOOD PRESSURE: 121 MMHG | DIASTOLIC BLOOD PRESSURE: 80 MMHG | HEART RATE: 98 BPM

## 2022-03-18 DIAGNOSIS — K75.4 AUTOIMMUNE HEPATITIS (H): ICD-10-CM

## 2022-03-18 LAB
INR PPP: 1.29 (ref 0.85–1.15)
PLATELET # BLD AUTO: 118 10E3/UL (ref 150–450)

## 2022-03-18 PROCEDURE — 76942 ECHO GUIDE FOR BIOPSY: CPT | Mod: 26 | Performed by: PHYSICIAN ASSISTANT

## 2022-03-18 PROCEDURE — 250N000009 HC RX 250: Performed by: PHYSICIAN ASSISTANT

## 2022-03-18 PROCEDURE — 88313 SPECIAL STAINS GROUP 2: CPT | Mod: 26 | Performed by: PATHOLOGY

## 2022-03-18 PROCEDURE — 36415 COLL VENOUS BLD VENIPUNCTURE: CPT | Performed by: NURSE PRACTITIONER

## 2022-03-18 PROCEDURE — 47000 NEEDLE BIOPSY OF LIVER PERQ: CPT | Performed by: PHYSICIAN ASSISTANT

## 2022-03-18 PROCEDURE — 88307 TISSUE EXAM BY PATHOLOGIST: CPT | Mod: 26 | Performed by: PATHOLOGY

## 2022-03-18 PROCEDURE — 258N000003 HC RX IP 258 OP 636: Performed by: NURSE PRACTITIONER

## 2022-03-18 PROCEDURE — 85049 AUTOMATED PLATELET COUNT: CPT | Performed by: NURSE PRACTITIONER

## 2022-03-18 PROCEDURE — 250N000011 HC RX IP 250 OP 636: Performed by: PHYSICIAN ASSISTANT

## 2022-03-18 PROCEDURE — 999N000133 HC STATISTIC PP CARE STAGE 2

## 2022-03-18 PROCEDURE — 99152 MOD SED SAME PHYS/QHP 5/>YRS: CPT

## 2022-03-18 PROCEDURE — 88313 SPECIAL STAINS GROUP 2: CPT | Mod: TC,59 | Performed by: STUDENT IN AN ORGANIZED HEALTH CARE EDUCATION/TRAINING PROGRAM

## 2022-03-18 PROCEDURE — 85610 PROTHROMBIN TIME: CPT | Performed by: NURSE PRACTITIONER

## 2022-03-18 PROCEDURE — 250N000013 HC RX MED GY IP 250 OP 250 PS 637: Performed by: PHYSICIAN ASSISTANT

## 2022-03-18 PROCEDURE — 272N000505 IR LIVER BIOPSY PERCUTANEOUS

## 2022-03-18 PROCEDURE — 99152 MOD SED SAME PHYS/QHP 5/>YRS: CPT | Performed by: PHYSICIAN ASSISTANT

## 2022-03-18 PROCEDURE — 999N000142 HC STATISTIC PROCEDURE PREP ONLY

## 2022-03-18 RX ORDER — SODIUM CHLORIDE 9 MG/ML
INJECTION, SOLUTION INTRAVENOUS CONTINUOUS
Status: DISCONTINUED | OUTPATIENT
Start: 2022-03-18 | End: 2022-03-18 | Stop reason: HOSPADM

## 2022-03-18 RX ORDER — FLUMAZENIL 0.1 MG/ML
0.2 INJECTION, SOLUTION INTRAVENOUS
Status: DISCONTINUED | OUTPATIENT
Start: 2022-03-18 | End: 2022-03-18 | Stop reason: HOSPADM

## 2022-03-18 RX ORDER — FENTANYL CITRATE 50 UG/ML
25-50 INJECTION, SOLUTION INTRAMUSCULAR; INTRAVENOUS EVERY 5 MIN PRN
Status: DISCONTINUED | OUTPATIENT
Start: 2022-03-18 | End: 2022-03-18 | Stop reason: HOSPADM

## 2022-03-18 RX ORDER — LIDOCAINE 40 MG/G
CREAM TOPICAL
Status: DISCONTINUED | OUTPATIENT
Start: 2022-03-18 | End: 2022-03-18 | Stop reason: HOSPADM

## 2022-03-18 RX ORDER — NALOXONE HYDROCHLORIDE 0.4 MG/ML
0.2 INJECTION, SOLUTION INTRAMUSCULAR; INTRAVENOUS; SUBCUTANEOUS
Status: DISCONTINUED | OUTPATIENT
Start: 2022-03-18 | End: 2022-03-18 | Stop reason: HOSPADM

## 2022-03-18 RX ORDER — OXYCODONE HYDROCHLORIDE 5 MG/1
5 TABLET ORAL ONCE
Status: COMPLETED | OUTPATIENT
Start: 2022-03-18 | End: 2022-03-18

## 2022-03-18 RX ORDER — NALOXONE HYDROCHLORIDE 0.4 MG/ML
0.4 INJECTION, SOLUTION INTRAMUSCULAR; INTRAVENOUS; SUBCUTANEOUS
Status: DISCONTINUED | OUTPATIENT
Start: 2022-03-18 | End: 2022-03-18 | Stop reason: HOSPADM

## 2022-03-18 RX ORDER — URSODIOL 250 MG/1
250 TABLET, FILM COATED ORAL 2 TIMES DAILY
COMMUNITY
End: 2024-02-23

## 2022-03-18 RX ADMIN — FENTANYL CITRATE 25 MCG: 50 INJECTION, SOLUTION INTRAMUSCULAR; INTRAVENOUS at 09:35

## 2022-03-18 RX ADMIN — MIDAZOLAM 0.5 MG: 1 INJECTION INTRAMUSCULAR; INTRAVENOUS at 09:35

## 2022-03-18 RX ADMIN — FENTANYL CITRATE 50 MCG: 50 INJECTION, SOLUTION INTRAMUSCULAR; INTRAVENOUS at 09:27

## 2022-03-18 RX ADMIN — MIDAZOLAM 1 MG: 1 INJECTION INTRAMUSCULAR; INTRAVENOUS at 09:27

## 2022-03-18 RX ADMIN — OXYCODONE HYDROCHLORIDE 5 MG: 5 TABLET ORAL at 10:33

## 2022-03-18 RX ADMIN — LIDOCAINE HYDROCHLORIDE 10 ML: 10 INJECTION, SOLUTION EPIDURAL; INFILTRATION; INTRACAUDAL; PERINEURAL at 09:36

## 2022-03-18 RX ADMIN — SODIUM CHLORIDE: 9 INJECTION, SOLUTION INTRAVENOUS at 08:35

## 2022-03-18 NOTE — PROCEDURES
[facility]    Procedure: IR Procedure Note    Date/Time: 3/18/2022 9:59 AM  Performed by: Josh Reina PA-C  Authorized by: Josh Reina PA-C       UNIVERSAL PROTOCOL   Site Marked: NA  Prior Images Obtained and Reviewed:  Yes  Required items: Required blood products, implants, devices and special equipment available    Patient identity confirmed:  Verbally with patient, arm band, provided demographic data and hospital-assigned identification number  Patient was reevaluated immediately before administering moderate or deep sedation or anesthesia  Confirmation Checklist:  Patient's identity using two indicators, relevant allergies, procedure was appropriate and matched the consent or emergent situation and correct equipment/implants were available  Time out: Immediately prior to the procedure a time out was called    Universal Protocol: the Joint Commission Universal Protocol was followed    Preparation: Patient was prepped and draped in usual sterile fashion    ESBL (mL):  3     ANESTHESIA    Anesthesia: Local infiltration  Local Anesthetic:  Lidocaine 1% without epinephrine  Anesthetic Total (mL):  10      SEDATION  Patient Sedated: Yes    Sedation Type:  Moderate (conscious) sedation  Sedation:  Fentanyl and midazolam  Vital signs: Vital signs monitored during sedation    See dictated procedure note for full details.  Findings: U/S guided random liver biopsy performed using a subxiphoid approach. Three 18 gauge cores taken.    Specimens: core needle biopsy specimens sent for pathological analysis    Complications: None    Condition: Stable    Plan: Bedrest for 2-4 hours. No strenuous activity for 3 days. Follow up per primary team.       PROCEDURE    Patient Tolerance:  Patient tolerated the procedure well with no immediate complications  Length of time physician/provider present for 1:1 monitoring during sedation: 20

## 2022-03-18 NOTE — PROGRESS NOTES
Pt returned to 2A #7 following liver biopsy. Resting comfortably. Water provided. No further needs. PA Latosha at bedside to talk to patient and family.

## 2022-03-18 NOTE — PRE-PROCEDURE
GENERAL PRE-PROCEDURE:     Verbal consent obtained?: Yes    Written consent obtained?: Yes    Risks and benefits: Risks, benefits and alternatives were discussed    Consent given by:  Patient  Patient states understanding of procedure being performed: Yes    Patient's understanding of procedure matches consent: Yes    Procedure consent matches procedure scheduled: Yes    Appropriately NPO:  Yes  Mallampati  :  Grade 3- soft palate visible, posterior pharyngeal wall not visible  Lungs:  Lungs clear with good breath sounds bilaterally (Distant lung sounds throughout.)  Heart:  Normal heart sounds and rate  History & Physical reviewed:  History and physical reviewed and no updates needed  Statement of review:  I have reviewed the lab findings, diagnostic data, medications, and the plan for sedation

## 2022-03-18 NOTE — PROGRESS NOTES
Pt tolerated oral intake. Pain improved after oxycodone. Discharge instructions reviewed, copy given to pt. Pt tolerated ambulation. Voided. PIV marino'd. Pt will discharge home accompanied by .

## 2022-03-18 NOTE — DISCHARGE INSTRUCTIONS
[facility]    Interventional Radiology  Patient Instructions Following Liver Biopsy    AFTER YOU GO HOME  ? If you were given sedation DO NOT drive or operate machinery at home or at work for at least 24 hours  ? DO relax and take it easy for 48 hours, no strenuous activity for 24 hours  ? DO drink plenty of fluids  ? DO resume your regular diet, unless otherwise instructed by your Primary Physician  ? Keep the dressing dry and in place for 24 hours.  ? DO NOT SMOKE FOR AT LEAST 24 HOURS, if you have been given any medications that were to help you relax or sedate you during your procedure  ? DO NOT drink alcoholic beverages the day of your procedure  ? DO NOT do any strenuous exercise or lifting (> 10 lbs) for at least 3 days following your procedure  ? DO NOT take a bath or shower for at least 12 hours following your procedure  ? Remove dressing after shower the next day. Replace with Band aid for 2 days.  Never leave a wet dressing in place.  ? DO NOT make any important or legal decisions for 24 hours following your procedure  ? There should be minimum drainage from the biopsy site    CALL THE PHYSICIAN IF:  ? You start bleeding from the procedure site.  If you do start to bleed from that site,  hold pressure on the site for a minimum of 10 minutes.  Your physician will tell you if you need to return to the hospital  ? You develop nausea or vomiting  ? You have excessive swelling, redness, or tenderness at the site  ? You have drainage that looks like it is infected.  ? You experience severe pain  ? You develop hives or a rash or unexplained itching  ? You develop shortness of breath  ? You develop a temperature of 101 degrees F or greater    ADDITIONAL INSTRUCTIONS: None    [facility] INTERVENTIONAL RADIOLOGY DEPARTMENT  Procedure Physician:         Krishan Reina           Date of procedure: March 18, 2022  Telephone Numbers: [phone] Monday-Friday 8:00 am to 4:30 pm  [phone] After 4:30 pm  Monday-Friday, Weekends & Holidays.   Ask for the Interventional Radiologist on call.  Someone is on call 24 hrs/day  [facility] toll free number: [phone] Monday-Friday 8:00 am to 4:30 pm  [facility] Emergency Dept: [phone]

## 2022-03-18 NOTE — IR NOTE
Patient Name: Yaritza Dias  Medical Record Number: [id number]  Today's Date: 3/18/2022    Procedure: Image guided liver biopsy  Proceduralist: Krishan REED  Pathology present: no    Procedure Start: 929  Procedure end: 948  Sedation medications administered: 75 mcg fentanyl, 1.5 mg versed     Report given to: Radha VARGAS 2A  : jacquelyn    Other Notes: Pt arrived to IR room 6 from . Consent reviewed. Pt denies any questions or concerns regarding procedure. Pt positioned supine and monitored per protocol. Pt tolerated procedure without any noted complications. Pt transferred back to .    2 hrs bedrest.      0 = swallows foods/liquids without difficulty

## 2022-03-21 DIAGNOSIS — R17 ELEVATED BILIRUBIN: ICD-10-CM

## 2022-03-21 DIAGNOSIS — K75.4 AUTOIMMUNE HEPATITIS (H): Primary | ICD-10-CM

## 2022-03-21 RX ORDER — URSODIOL 250 MG/1
250 TABLET, FILM COATED ORAL 2 TIMES DAILY
Qty: 180 TABLET | Refills: 3 | Status: SHIPPED | OUTPATIENT
Start: 2022-03-21 | End: 2022-06-19

## 2022-03-21 RX ORDER — AZATHIOPRINE 75 MG/1
75 TABLET ORAL DAILY
Qty: 90 TABLET | Refills: 3 | Status: SHIPPED | OUTPATIENT
Start: 2022-03-21 | End: 2022-12-16

## 2022-03-23 LAB
PATH REPORT.COMMENTS IMP SPEC: NORMAL
PATH REPORT.FINAL DX SPEC: NORMAL
PATH REPORT.GROSS SPEC: NORMAL
PATH REPORT.MICROSCOPIC SPEC OTHER STN: NORMAL
PATH REPORT.RELEVANT HX SPEC: NORMAL
PHOTO IMAGE: NORMAL

## 2022-06-25 ENCOUNTER — MYC MEDICAL ADVICE (OUTPATIENT)
Dept: GASTROENTEROLOGY | Facility: CLINIC | Age: 32
End: 2022-06-25

## 2022-06-25 DIAGNOSIS — K75.4 AUTOIMMUNE HEPATITIS (H): Primary | ICD-10-CM

## 2022-06-25 RX ORDER — PREDNISONE 10 MG/1
40 TABLET ORAL DAILY
Qty: 120 TABLET | Refills: 0 | Status: SHIPPED | OUTPATIENT
Start: 2022-06-25 | End: 2022-07-25

## 2022-06-30 DIAGNOSIS — R79.89 ELEVATED LFTS: Primary | ICD-10-CM

## 2022-06-30 DIAGNOSIS — K75.4 AUTOIMMUNE HEPATITIS (H): ICD-10-CM

## 2022-06-30 NOTE — PROGRESS NOTES
Faxed lab orders to  in [place]. Patient has lab appointment today.    Shana TIWARI LPN  Hepatology Clinic

## 2022-07-06 DIAGNOSIS — K75.4 AUTOIMMUNE HEPATITIS (H): ICD-10-CM

## 2022-07-06 DIAGNOSIS — R17 ELEVATED BILIRUBIN: Primary | ICD-10-CM

## 2022-07-20 ENCOUNTER — TELEPHONE (OUTPATIENT)
Dept: GASTROENTEROLOGY | Facility: CLINIC | Age: 32
End: 2022-07-20

## 2022-07-20 NOTE — TELEPHONE ENCOUNTER
Screening Questions    BlueKIND OF PREP RedLOCATION [review exclusion criteria] GreenSEDATION TYPE      1. Are you active on mychart? Y    2. What insurance is in the chart? [employer]     3.   Ordering/Referring Provider: DR. MORENO     4. BMI   (If greater than 40 review exclusion criteria [PAC APPT IF [MAC] @ [facility])  32.6  [If yes, BMI OVER 40-EXTENDED PREP]      **(Sedation review/consideration needed)**  Do you have a legal guardian or Medical Power of    and/or are you able to give consent for your medical care?     Y    5. Have you had a positive covid test in the last 90 days?   N - N    6.  Are you currently on dialysis?   N [ If yes, G-PREP & HOSPITAL setting ONLY]     7.  Do you have chronic kidney disease?  N [ If yes, G-PREP ]    8.   Do you have a diagnosis of diabetes?   N   [ If yes, G-PREP ]    9.  On a regular basis do you go 3-5 days between bowel movements?      [ If yes, EXTENDED PREP]    10.  Are you taking any prescription pain medications on a routine schedule?    N - N [ If yes, EXTENDED PREP] [If yes, MAC]      11.   Do you have any chemical dependencies such as alcohol, street drugs, or methadone?    N [If yes, MAC]    12.   Do you have any history of post-traumatic stress syndrome, severe anxiety or history of psychosis?    N  [If yes, MAC]    13.  [FEMALES] Are you currently pregnant? N    If yes, how many weeks?       Respiratory/Heart Screening:  [If yes to any of the following HOSPITAL setting only]     14. Do you have Pulmonary Hypertension [Lungs]?   N       15. Do you have UNCONTROLLED asthma?   N     16.  Do you use daily home oxygen?  N      17. Do you have mod to severe Obstructive Sleep Apnea?         (OKAY @ [facility]  [facility]  SH  PH  RI  MG - if pt is not on OXYGEN)  N      18.   Have you had a heart or lung transplant?   N      19.   Have you had a stroke or Transient ischemic attack (TIA - aka  mini stroke ) within 6 months?  (If yes, please review exclusion  criteria)  N     20.   In the past 6 months, have you had any heart related issues including cardiomyopathy or heart attack?   N           If yes, did it require cardiac stenting or other implantable device?   N      21.   Do you have any implantable devices in your body (pacemaker, defib, LVAD)? (If yes, please review exclusion criteria)  N     22. Do you take nitroglycerin?   N           If yes, how often? N  (if yes, HOSPITAL setting ONLY)    23.  Are you currently taking any blood thinners?    [If yes, INFORM patient to follw up w/ ORDERING PROVIDER FOR BRIDGING INSTRUCTIONS]     N    24.   Do you transfer independently?                (If NO, please HOSPITAL setting ONLY)  Y    25.   Preferred LOCAL Pharmacy for Pre Prescription:      [id number] - [place], [place] - [address] [place] [address]  AT [facility] [address]    Scheduling Details  (Please ask for phone number if not scheduled by patient)      Caller : BANDAR   Date of Procedure: 9/8/2022  Surgeon: VICENTE   Location: [facility]        Sedation Type: MAC l   Conscious Sedation- Needs  for 6 hours after the procedure  MAC/General-Needs  for 24 hours after procedure    N :[Pre-op Required] at [facility]  MG and OR for MAC sedation   (advise patient they will need a pre-op WITH IN 30 DAYS of procedure date)     Type of Procedure Scheduled:   Upper Endoscopy [EGD]    Which Colonoscopy Prep was Sent?:    -     [facility]OR[facility]S CF PATIENTS & GRO[facility]S PATIENTS NEEDS EXTENDED PREP       Informed patient they will need an adult  Y  Cannot take any type of public or medical transportation alone    Pre-Procedure Covid test to be completed at [facility] or Externally: Y  **INFORMED OF HOME TESTING & LAB OPTION**        Confirmed Nurse will call to complete assessment Y    Additional comments: N

## 2022-08-30 ENCOUNTER — TELEPHONE (OUTPATIENT)
Dept: GASTROENTEROLOGY | Facility: CLINIC | Age: 32
End: 2022-08-30

## 2022-08-30 NOTE — TELEPHONE ENCOUNTER
Attempted to contact patient regarding upcoming EGD procedure on 9.8.2022 for pre assessment questions. No answer.     Left message to return call to [phone] #3    Discuss at home rapid antigen COVID test 1-2 days prior to procedure.    Arrival time: 1300    Facility location: [facility]    Sedation type: MAC    Indication for procedure: liver disease, evaluate for varices    Karmen Phillips RN

## 2022-09-06 NOTE — TELEPHONE ENCOUNTER
2nd attempt    Attempted to contact patient regarding upcoming EGD procedure on 9.8.22 for pre assessment questions. No answer.     Left message to return call to [phone] #4    Producteevt message sent.    Kaleigh Barlow RN

## 2022-09-06 NOTE — TELEPHONE ENCOUNTER
Pre assessment questions completed for upcoming EGD procedure scheduled on 9.8.22    Discussed at home rapid antigen COVID test 1-2 days prior to procedure.    Reviewed procedural arrival time 1300 and facility location [facility].    Designated  policy reviewed. Instructed to have someone stay 24 hours post procedure.     Anticoagulation/blood thinners? No    Electronic implanted devices? No    Reviewed EGD prep instructions with patient.     Patient verbalized understanding and had no questions or concerns at this time.    Kaleigh Barlow RN

## 2022-09-08 ENCOUNTER — ANESTHESIA EVENT (OUTPATIENT)
Dept: SURGERY | Facility: AMBULATORY SURGERY CENTER | Age: 32
End: 2022-09-08
Payer: COMMERCIAL

## 2022-09-08 ENCOUNTER — ANESTHESIA (OUTPATIENT)
Dept: SURGERY | Facility: AMBULATORY SURGERY CENTER | Age: 32
End: 2022-09-08
Payer: COMMERCIAL

## 2022-09-08 ENCOUNTER — HOSPITAL ENCOUNTER (OUTPATIENT)
Facility: AMBULATORY SURGERY CENTER | Age: 32
Discharge: HOME | End: 2022-09-08
Attending: STUDENT IN AN ORGANIZED HEALTH CARE EDUCATION/TRAINING PROGRAM
Payer: COMMERCIAL

## 2022-09-08 VITALS — HEART RATE: 127 BPM

## 2022-09-08 VITALS
HEART RATE: 115 BPM | DIASTOLIC BLOOD PRESSURE: 88 MMHG | HEIGHT: 62 IN | WEIGHT: 202 LBS | OXYGEN SATURATION: 95 % | SYSTOLIC BLOOD PRESSURE: 127 MMHG | RESPIRATION RATE: 18 BRPM | TEMPERATURE: 98 F | BODY MASS INDEX: 37.17 KG/M2

## 2022-09-08 DIAGNOSIS — K75.4 AUTOIMMUNE HEPATITIS (H): Primary | ICD-10-CM

## 2022-09-08 LAB
HCG UR QL: NEGATIVE
INTERNAL QC OK POCT: NORMAL
POCT KIT EXPIRATION DATE: NORMAL
POCT KIT LOT NUMBER: NORMAL
UPPER GI ENDOSCOPY: NORMAL

## 2022-09-08 PROCEDURE — 81025 URINE PREGNANCY TEST: CPT | Performed by: PATHOLOGY

## 2022-09-08 PROCEDURE — 43235 EGD DIAGNOSTIC BRUSH WASH: CPT

## 2022-09-08 RX ORDER — KETAMINE HYDROCHLORIDE 10 MG/ML
INJECTION INTRAMUSCULAR; INTRAVENOUS PRN
Status: DISCONTINUED | OUTPATIENT
Start: 2022-09-08 | End: 2022-09-08

## 2022-09-08 RX ORDER — NALOXONE HYDROCHLORIDE 0.4 MG/ML
0.4 INJECTION, SOLUTION INTRAMUSCULAR; INTRAVENOUS; SUBCUTANEOUS
Status: DISCONTINUED | OUTPATIENT
Start: 2022-09-08 | End: 2022-09-09 | Stop reason: HOSPADM

## 2022-09-08 RX ORDER — GLYCOPYRROLATE 0.2 MG/ML
INJECTION, SOLUTION INTRAMUSCULAR; INTRAVENOUS PRN
Status: DISCONTINUED | OUTPATIENT
Start: 2022-09-08 | End: 2022-09-08

## 2022-09-08 RX ORDER — ONDANSETRON 2 MG/ML
4 INJECTION INTRAMUSCULAR; INTRAVENOUS
Status: DISCONTINUED | OUTPATIENT
Start: 2022-09-08 | End: 2022-09-08 | Stop reason: HOSPADM

## 2022-09-08 RX ORDER — SODIUM CHLORIDE, SODIUM LACTATE, POTASSIUM CHLORIDE, CALCIUM CHLORIDE 600; 310; 30; 20 MG/100ML; MG/100ML; MG/100ML; MG/100ML
INJECTION, SOLUTION INTRAVENOUS CONTINUOUS
Status: DISCONTINUED | OUTPATIENT
Start: 2022-09-08 | End: 2022-09-08 | Stop reason: HOSPADM

## 2022-09-08 RX ORDER — PROCHLORPERAZINE MALEATE 10 MG
10 TABLET ORAL EVERY 6 HOURS PRN
Status: DISCONTINUED | OUTPATIENT
Start: 2022-09-08 | End: 2022-09-09 | Stop reason: HOSPADM

## 2022-09-08 RX ORDER — FLUMAZENIL 0.1 MG/ML
0.2 INJECTION, SOLUTION INTRAVENOUS
Status: DISCONTINUED | OUTPATIENT
Start: 2022-09-08 | End: 2022-09-09 | Stop reason: HOSPADM

## 2022-09-08 RX ORDER — LIDOCAINE HYDROCHLORIDE 20 MG/ML
INJECTION, SOLUTION INFILTRATION; PERINEURAL PRN
Status: DISCONTINUED | OUTPATIENT
Start: 2022-09-08 | End: 2022-09-08

## 2022-09-08 RX ORDER — LIDOCAINE 40 MG/G
CREAM TOPICAL
Status: DISCONTINUED | OUTPATIENT
Start: 2022-09-08 | End: 2022-09-08 | Stop reason: HOSPADM

## 2022-09-08 RX ORDER — NALOXONE HYDROCHLORIDE 0.4 MG/ML
0.2 INJECTION, SOLUTION INTRAMUSCULAR; INTRAVENOUS; SUBCUTANEOUS
Status: DISCONTINUED | OUTPATIENT
Start: 2022-09-08 | End: 2022-09-09 | Stop reason: HOSPADM

## 2022-09-08 RX ORDER — ONDANSETRON 2 MG/ML
4 INJECTION INTRAMUSCULAR; INTRAVENOUS EVERY 6 HOURS PRN
Status: DISCONTINUED | OUTPATIENT
Start: 2022-09-08 | End: 2022-09-09 | Stop reason: HOSPADM

## 2022-09-08 RX ORDER — ONDANSETRON 4 MG/1
4 TABLET, ORALLY DISINTEGRATING ORAL EVERY 6 HOURS PRN
Status: DISCONTINUED | OUTPATIENT
Start: 2022-09-08 | End: 2022-09-09 | Stop reason: HOSPADM

## 2022-09-08 RX ORDER — PROPOFOL 10 MG/ML
INJECTION, EMULSION INTRAVENOUS PRN
Status: DISCONTINUED | OUTPATIENT
Start: 2022-09-08 | End: 2022-09-08

## 2022-09-08 RX ORDER — PROPOFOL 10 MG/ML
INJECTION, EMULSION INTRAVENOUS CONTINUOUS PRN
Status: DISCONTINUED | OUTPATIENT
Start: 2022-09-08 | End: 2022-09-08

## 2022-09-08 RX ADMIN — PROPOFOL 350 MCG/KG/MIN: 10 INJECTION, EMULSION INTRAVENOUS at 14:30

## 2022-09-08 RX ADMIN — PROPOFOL 50 MG: 10 INJECTION, EMULSION INTRAVENOUS at 14:30

## 2022-09-08 RX ADMIN — GLYCOPYRROLATE 0.2 MG: 0.2 INJECTION, SOLUTION INTRAMUSCULAR; INTRAVENOUS at 14:30

## 2022-09-08 RX ADMIN — LIDOCAINE HYDROCHLORIDE 60 MG: 20 INJECTION, SOLUTION INFILTRATION; PERINEURAL at 14:30

## 2022-09-08 RX ADMIN — SODIUM CHLORIDE, SODIUM LACTATE, POTASSIUM CHLORIDE, CALCIUM CHLORIDE: 600; 310; 30; 20 INJECTION, SOLUTION INTRAVENOUS at 14:26

## 2022-09-08 RX ADMIN — KETAMINE HYDROCHLORIDE 20 MG: 10 INJECTION INTRAMUSCULAR; INTRAVENOUS at 14:30

## 2022-09-08 NOTE — H&P
"Yaritza Dias  [id number]  female  31 year old      Reason for procedure/surgery: EGD screening for varices    Patient Active Problem List   Diagnosis     Autoimmune hepatitis (H)       Past Surgical History:    Past Surgical History:   Procedure Laterality Date     IR LIVER BIOPSY PERCUTANEOUS  3/18/2022       Past Medical History:   Past Medical History:   Diagnosis Date     Autoimmune hepatitis (H)      Gestational diabetes        Social History:   Social History     Tobacco Use     Smoking status: Never Smoker     Smokeless tobacco: Never Used   Substance Use Topics     Alcohol use: Not Currently       Family History:   Family History   Problem Relation Age of Onset     Celiac Disease Sister        Allergies:   Allergies   Allergen Reactions     Penicillins GI Disturbance       Active Medications:   Current Outpatient Medications   Medication Sig Dispense Refill     azaTHIOprine 75 MG TABS Take 75 mg by mouth daily 90 tablet 3     azaTHIOprine 75 MG TABS Take 100 mg by mouth daily (Patient taking differently: Take 100 mg by mouth daily) 90 tablet 3     calcium gluconate 500 MG tablet Take 1,000 mg by mouth daily        cholecalciferol 25 MCG (1000 UT) TABS Take 1,000 Units by mouth daily        ursodiol (ACTIGALL) 250 MG tablet Take 250 mg by mouth 2 times daily         Systemic Review:   CONSTITUTIONAL: NEGATIVE for fever, chills, change in weight  ENT/MOUTH: NEGATIVE for ear, mouth and throat problems  RESP: NEGATIVE for significant cough or SOB  CV: NEGATIVE for chest pain, palpitations or peripheral edema    Physical Examination:   Vital Signs: BP (!) 135/94   Temp 97.2  F (36.2  C)   Resp 14   Ht 1.575 m (5' 2\")   Wt 91.6 kg (202 lb)   SpO2 100%   BMI 36.95 kg/m    GENERAL: healthy, alert and no distress  NECK: no adenopathy, no asymmetry, masses, or scars  RESP: lungs clear to auscultation - no rales, rhonchi or wheezes  CV: regular rate and rhythm, normal S1 S2, no S3 or S4, no murmur, click or " rub, no peripheral edema and peripheral pulses strong  ABDOMEN: soft, nontender, no hepatosplenomegaly, no masses and bowel sounds normal  MS: no gross musculoskeletal defects noted, no edema      Plan: Appropriate to proceed as scheduled.      Ashlee Borden MD  9/8/2022    PCP:  Laureano Douglas

## 2022-09-08 NOTE — ANESTHESIA PREPROCEDURE EVALUATION
Anesthesia Pre-Procedure Evaluation    Patient: Yaritza Dias   MRN: [id number] : 1990        Procedure : Procedure(s):  ESOPHAGOGASTRODUODENOSCOPY (EGD)          Past Medical History:   Diagnosis Date     Autoimmune hepatitis (H)      Gestational diabetes       Past Surgical History:   Procedure Laterality Date     IR LIVER BIOPSY PERCUTANEOUS  3/18/2022      Allergies   Allergen Reactions     Penicillins GI Disturbance      Social History     Tobacco Use     Smoking status: Never Smoker     Smokeless tobacco: Never Used   Substance Use Topics     Alcohol use: Not Currently      Wt Readings from Last 1 Encounters:   22 91.6 kg (202 lb)        Anesthesia Evaluation            ROS/MED HX  ENT/Pulmonary:       Neurologic:       Cardiovascular:       METS/Exercise Tolerance:     Hematologic:       Musculoskeletal:       GI/Hepatic:     (+) hepatitis type Other, liver disease,     Renal/Genitourinary:       Endo:     (+) type II DM (GESTATIONAL),     Psychiatric/Substance Use:       Infectious Disease:       Malignancy:       Other:               OUTSIDE LABS:  CBC:   Lab Results   Component Value Date    WBC 8.0 2021    WBC 19.4 (H) 2021    HGB 12.6 2021    HGB 12.7 2021    HCT 38.4 2021    HCT 38.6 2021     (L) 2022     (L) 2021     BMP:   Lab Results   Component Value Date     2021     2021    POTASSIUM 3.9 2021    POTASSIUM 4.0 2021    CHLORIDE 107 09/15/2021    CHLORIDE 107 2021    CO2 29 2021    CO2 26 2021    BUN 10 2021    BUN 11 2021    CR 0.84 2021    CR 0.65 2021     (H) 2021    GLC 79 2021     COAGS:   Lab Results   Component Value Date    PTT 36 2021    INR 1.29 (H) 2022    FIBR 146 (L) 2021     POC:   Lab Results   Component Value Date    HCG Negative 2022     HEPATIC:   Lab Results   Component Value Date     ALBUMIN 2.3 (L) 08/11/2021    PROTTOTAL 5.2 (L) 08/11/2021     (H) 08/11/2021     (H) 08/11/2021    ALKPHOS 158 (H) 08/11/2021    BILITOTAL 5.9 (H) 08/11/2021    JILL <10 (L) 07/26/2021     OTHER:   Lab Results   Component Value Date    LACT 1.5 07/25/2021    KENDY 8.4 (L) 08/11/2021    PHOS 3.6 07/26/2021    TSH 0.17 (L) 07/23/2021    T4 1.06 07/23/2021    CRP 5.7 07/25/2021       Anesthesia Plan    ASA Status:  2      Anesthesia Type: MAC.     - Reason for MAC: immobility needed              Consents    Anesthesia Plan(s) and associated risks, benefits, and realistic alternatives discussed. Questions answered and patient/representative(s) expressed understanding.    - Discussed:     - Discussed with:  Patient      - Extended Intubation/Ventilatory Support Discussed: No.      - Patient is DNR/DNI Status: No    Use of blood products discussed: No .     Postoperative Care    Pain management: IV analgesics, Oral pain medications.   PONV prophylaxis: Ondansetron (or other 5HT-3), Dexamethasone or Solumedrol     Comments:                Alfred Morataya MD, MD

## 2022-09-08 NOTE — ANESTHESIA POSTPROCEDURE EVALUATION
Patient: Yaritza Dias    Procedure: Procedure(s):  ESOPHAGOGASTRODUODENOSCOPY (EGD)       Anesthesia Type:  MAC    Note:  Disposition: Outpatient   Postop Pain Control: Uneventful            Sign Out: Well controlled pain   PONV: No   Neuro/Psych: Uneventful            Sign Out: Acceptable/Baseline neuro status   Airway/Respiratory: Uneventful            Sign Out: Acceptable/Baseline resp. status   CV/Hemodynamics: Uneventful            Sign Out: Acceptable CV status; No obvious hypovolemia; No obvious fluid overload   Other NRE: NONE   DID A NON-ROUTINE EVENT OCCUR? No           Last vitals:  Vitals Value Taken Time   BP 96/56 09/08/22 1442   Temp 36.8  C (98.2  F) 09/08/22 1442   Pulse 133 09/08/22 1442   Resp 16 09/08/22 1442   SpO2 95 % 09/08/22 1442       Electronically Signed By: Alfred Morataya MD, MD  September 8, 2022  2:56 PM

## 2022-09-08 NOTE — ANESTHESIA CARE TRANSFER NOTE
Patient: Yaritza Dias    Procedure: Procedure(s):  ESOPHAGOGASTRODUODENOSCOPY (EGD)       Diagnosis: Autoimmune hepatitis (H) [K75.4]  Diagnosis Additional Information: No value filed.    Anesthesia Type:   MAC     Note:    Oropharynx: oropharynx clear of all foreign objects and spontaneously breathing  Level of Consciousness: awake  Oxygen Supplementation: room air    Independent Airway: airway patency satisfactory and stable  Dentition: dentition unchanged  Vital Signs Stable: post-procedure vital signs reviewed and stable  Report to RN Given: handoff report given  Patient transferred to: Phase II  Comments: Report to Phase II RN. Resps easy and regular.   Handoff Report: Identifed the Patient, Identified the Reponsible Provider, Reviewed the pertinent medical history, Discussed the surgical course, Reviewed Intra-OP anesthesia mangement and issues during anesthesia, Set expectations for post-procedure period and Allowed opportunity for questions and acknowledgement of understanding      Vitals:  Vitals Value Taken Time   BP 96/56 09/08/22 1442   Temp 36.8  C (98.2  F) 09/08/22 1442   Pulse 133 09/08/22 1442   Resp 16 09/08/22 1442   SpO2 95 % 09/08/22 1442       Electronically Signed By: CATRACHITO VEGA CRNA  September 8, 2022  2:44 PM

## 2022-09-08 NOTE — OR NURSING
Patient's heart rate 130 down to 115. Reviewed with Dr. Morataya who did not order any interventions and gave VORB to discharge when she feels ready.     Rocio Burrows RN

## 2022-09-15 ENCOUNTER — TELEPHONE (OUTPATIENT)
Dept: GASTROENTEROLOGY | Facility: CLINIC | Age: 32
End: 2022-09-15

## 2022-09-19 ENCOUNTER — TELEPHONE (OUTPATIENT)
Dept: GASTROENTEROLOGY | Facility: CLINIC | Age: 32
End: 2022-09-19

## 2022-10-03 ENCOUNTER — HEALTH MAINTENANCE LETTER (OUTPATIENT)
Age: 32
End: 2022-10-03

## 2022-12-12 ENCOUNTER — LAB (OUTPATIENT)
Dept: LAB | Facility: CLINIC | Age: 32
End: 2022-12-12
Payer: COMMERCIAL

## 2022-12-12 DIAGNOSIS — K75.4 AUTOIMMUNE HEPATITIS (H): ICD-10-CM

## 2022-12-12 LAB
BASOPHILS # BLD AUTO: 0 10E3/UL (ref 0–0.2)
BASOPHILS NFR BLD AUTO: 1 %
EOSINOPHIL # BLD AUTO: 0.1 10E3/UL (ref 0–0.7)
EOSINOPHIL NFR BLD AUTO: 3 %
ERYTHROCYTE [DISTWIDTH] IN BLOOD BY AUTOMATED COUNT: 14.7 % (ref 10–15)
HCT VFR BLD AUTO: 34 % (ref 35–47)
HGB BLD-MCNC: 11.7 G/DL (ref 11.7–15.7)
IMM GRANULOCYTES # BLD: 0 10E3/UL
IMM GRANULOCYTES NFR BLD: 0 %
LYMPHOCYTES # BLD AUTO: 0.8 10E3/UL (ref 0.8–5.3)
LYMPHOCYTES NFR BLD AUTO: 31 %
MCH RBC QN AUTO: 31.8 PG (ref 26.5–33)
MCHC RBC AUTO-ENTMCNC: 34.4 G/DL (ref 31.5–36.5)
MCV RBC AUTO: 92 FL (ref 78–100)
MONOCYTES # BLD AUTO: 0.2 10E3/UL (ref 0–1.3)
MONOCYTES NFR BLD AUTO: 9 %
NEUTROPHILS # BLD AUTO: 1.5 10E3/UL (ref 1.6–8.3)
NEUTROPHILS NFR BLD AUTO: 57 %
PLATELET # BLD AUTO: 92 10E3/UL (ref 150–450)
RBC # BLD AUTO: 3.68 10E6/UL (ref 3.8–5.2)
WBC # BLD AUTO: 2.6 10E3/UL (ref 4–11)

## 2022-12-12 PROCEDURE — 85025 COMPLETE CBC W/AUTO DIFF WBC: CPT

## 2022-12-12 PROCEDURE — 36415 COLL VENOUS BLD VENIPUNCTURE: CPT

## 2022-12-16 ENCOUNTER — VIRTUAL VISIT (OUTPATIENT)
Dept: GASTROENTEROLOGY | Facility: CLINIC | Age: 32
End: 2022-12-16
Attending: STUDENT IN AN ORGANIZED HEALTH CARE EDUCATION/TRAINING PROGRAM
Payer: COMMERCIAL

## 2022-12-16 DIAGNOSIS — D69.6 THROMBOCYTOPENIA (H): ICD-10-CM

## 2022-12-16 DIAGNOSIS — K76.6 PORTAL HYPERTENSION (H): ICD-10-CM

## 2022-12-16 DIAGNOSIS — K75.4 AUTOIMMUNE HEPATITIS (H): Primary | ICD-10-CM

## 2022-12-16 PROCEDURE — 99214 OFFICE O/P EST MOD 30 MIN: CPT | Mod: GT | Performed by: STUDENT IN AN ORGANIZED HEALTH CARE EDUCATION/TRAINING PROGRAM

## 2022-12-16 RX ORDER — AZATHIOPRINE 50 MG/1
50 TABLET ORAL DAILY
Qty: 90 TABLET | Refills: 3 | Status: SHIPPED | OUTPATIENT
Start: 2022-12-16 | End: 2023-06-12

## 2022-12-16 ASSESSMENT — PAIN SCALES - GENERAL: PAINLEVEL: NO PAIN (0)

## 2022-12-16 NOTE — LETTER
"    12/16/2022         RE: Yaritza Dias  [address] [facility] [address]        Dear Colleague,    Thank you for referring your patient, Yaritza Dias, to the [facility] [place]. Please see a copy of my visit note below.    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 Dias is a 32 year old woman with a history of mild LFT elevation during pregnancy, who presents for evaluation of severe auto immune hepatitis.     Initial history:    She presented with jaundice the end of June 2021. Received her 2/2 COVID shots 5/2021. She had mild AST/ALT elevations during pregnancy, but normalized after pregnancy. She went to her PCP and got labs that showed   TBR 12.8 DBR 7.6 INR 1.6. She had a MRI 7/2/2021 that showed pericholecystic fluid. Also showed 4 \"masses\" in the liver - favored regenerative nodules. \"Slightly hyperintense on precontrast T1-weighted imaging, exhibits hypoechoic intensity on postcontrast imaging with enhancement of septations, exhibits diffusion restriction, and is hypointense on T2-weighted imaging. The largest mass is in the medial left hepatic lobe and is 7.7 x 6.5 x 6.2 cm. The right hepatic lobe is small. Much of the liver is in the left upper quadrant.\" Multiple liver cysts. Biliary system normal without dilation. Spleen was mildly enlarged with a few prominent belkis hepatic lymph nodes    Underwent liver bx 7/6 that showing severe hepatitis necrosis, thought to be autoimmune hepatitis, was started on prednisone 40 mg on July 14.  She had repeat labs done on July 18 showing worsening alkaline phosphatase and INR.  Patient also had hypotension upon her outpatient lab draw, upon second " admission her white count was elevated concerning for sepsis, she was treated with antibiotics.  Her INR slightly went up to 2.2, patient was transferred to [facility] for further evaluation.    While admitted to [facility], labs were stable and LFTs slowly improving. Weaned of steroids 12/2021    MRCP 2/2022showing lobular appearing liver and enlarged spleen - relatively unchanged since her last MRI    Interval Events  Taking imuran 50 mg daily and ursodiol 300 mg BID  - Not drinking alcohol, feeling well   - Underwent random liver bx 3/2022 showing Regenerative changes consistent with resolved severe hepatitis   .  Comment  Liver biopsy is optimal for evaluation by usual criteria and shows minimal portal or lobular inflammation. There is focal bile ductular  reaction without any evidence of cholestasis. No portal or lobular necroinflammatory activity is noted. Trichrome stain shows mild  portal fibrosis. Reticulin stain show features of regenerative nodular hyperplasia. PAS/PAS-D stains are negative for any significant  intracytoplasmic accumulations. Iron stain show no significant iron deposition.  Overall the changes seen in this biopsy are that of resolved severe hepatitis with mild portal fibrosis and features of nodular  regenerative hyperplasia. There is no evidence of active liver disease.    - EGD 9/2022 without varices     ROS: 14 point ROS negative except for positives noted in HPI.    PMHx:  Past Medical History:   Diagnosis Date     Autoimmune hepatitis (H)      Gestational diabetes    Autoimmune hepatitis potentially triggered by COVID vaccination    PSHx:   Vaginal delivery  Liver bx    FamHx:  Family History   Problem Relation Age of Onset     Celiac Disease Sister    Father had coronary artery disease, sister has celiac disease, no other autoimmune disorders in family.  Mother in law had liver transplant for ALD    SocHx:  Social History     Socioeconomic History     Marital status:      Spouse name:  Not on file     Number of children: Not on file     Years of education: Not on file     Highest education level: Not on file   Occupational History     Not on file   Tobacco Use     Smoking status: Never     Smokeless tobacco: Never   Substance and Sexual Activity     Alcohol use: Not Currently     Drug use: Never     Sexual activity: Not on file   Other Topics Concern     Parent/sibling w/ CABG, MI or angioplasty before 65F 55M? Not Asked   Social History Narrative    Lives with her , Jose R and their daughter Tesha.      Social Determinants of Health     Financial Resource Strain: Not on file   Food Insecurity: Not on file   Transportation Needs: Not on file   Physical Activity: Not on file   Stress: Not on file   Social Connections: Not on file   Intimate Partner Violence: Not on file   Housing Stability: Not on file       Medications:  Current Outpatient Medications   Medication     azaTHIOprine 75 MG TABS     azaTHIOprine 75 MG TABS     calcium gluconate 500 MG tablet     cholecalciferol 25 MCG (1000 UT) TABS     ursodiol (ACTIGALL) 250 MG tablet     No current facility-administered medications for this visit.       Allergies:  Allergies   Allergen Reactions     Penicillins GI Disturbance       Objective:  There were no vitals taken for this visit.  Constitutional: pleasant woman in NAD  Eyes: non icteric  Respiratory: Normal respiratory excursion   MSK: normal range of motion of visualized extremities  Abd: Non distended  Skin: No jaundice  Psychiatric: normal mood and orientation    Labs: reviewed in EHR    AMA negative  ROLANDO + 1:640  F actin 9  IgG normal  Anti LKM negative  SLA negative    Hepatitis A IgG positive, IgM negative  Hepatitis B Sag negative, core negative, ab immune  TPMT normal 30.9 (22-44)    MRCP 2/19/2022    Liver: Architectural distortion of the liver parenchyma, lobulated  with nodular contours, capsular retraction, atrophic right lobe,  elongated left hepatic lobe, scattered liver  cysts and nodular areas  of abnormal decreased enhancement in the arterial, portal venous and  delayed phases. Nodular foci demonstrate mild increased precontrast T1  signal, decreased T2 signal and no restricted diffusion in the present  study. Mild prominent intrahepatic biliary tree dilation. Unremarkable  common bile duct.     Gallbladder: No cholelithiasis. No gallbladder wall thickening. No  pericholecystic fluid.     Spleen: Splenule. Enlarged spleen measuring 16 cm in craniocaudad  dimension. No focal lesions.     Kidneys: No focal lesions. No hydronephrosis.     Adrenal glands: No nodules.     Pancreas: Preserved increased precontrast T1 signal. Main pancreatic  duct is not dilated. No focal lesions.     Bowel: No dilated loops of bowel.     Lymph nodes: Prominent belkis hepatis lymph nodes, likely reactive.  Additional prominent retroperitoneal and mesenteric lymph nodes,  stable.     Blood vessels: Patent liver vasculature. Patent abdominal vasculature.  No abdominal aortic aneurysm.     Lung bases: Unremarkable.     Bones and soft tissues: No aggressive bone lesions.     Mesentery and abdominal wall: Unremarkable.     Ascites: No ascites.     IMPRESSION: In this patient with history of altered immune hepatitis  and biopsy-proven hepatic parenchyma necrosis:  1. Lobulated nodular liver parenchyma with capsular retraction and  nodular areas of hypoenhancement, grossly stable from 7/3/2021 with no  convincing acute MRI findings on the present study.  2. Splenomegaly. No ascites.  3. Additional incidental findings as described above.    RUQ US 7/22/2021    GALLBLADDER: Not evaluated     LIVER: Partially visualized liver lesions, better evaluated on the recent MRI.       IVC: Normal where visualized.     ABDOMINAL DUPLEX: Technically limited due to overlying bowel gas and body habitus. The middle hepatic vein is not visualized. The left hepatic vein is patent with flow in the normal direction. The right  hepatic vein is diminutive but patent with flow in the normal direction.. The hepatic artery, IVC, portal veins, and splenic vein are patent with flow in the normal direction.     IMPRESSION:   1.  Technically limited Doppler evaluation. The middle hepatic vein is not visualized. The left hepatic vein is patent with flow in the normal direction. The right hepatic vein is diminutive but patent with flow in the normal direction. The hepatic artery, IVC, portal veins, and splenic vein are patent with flow in the normal direction.     MRI ABD 7/3/21:    IMPRESSION:   1.  Multiple liver masses are indeterminate. Regenerative nodules are favored. Metastatic disease is less likely given the patient's age. Atypical presentation of a primary liver mass is possible. Follow-up MRI could be considered. Biopsy could be considered.   2.  Mild splenomegaly is indeterminate. Portal hypertension could contribute to splenomegaly.   3.  Mild pericholecystic fluid is nonspecific. Acute cholecystitis is not excluded. There is no bile duct dilatation. No biliary filling defects or strictures identified.         MRI ABD 7/22/21:  IMPRESSION:   1.  Edematous gallbladder wall thickening has increased. Trace pericholecystic fluid is unchanged. Findings may indicate acalculus cholecystitis. Consider nuclear medicine hepatobiliary scan.     2.  Stable atypical appearance of the liver consistent with nonspecific heterogeneous parenchymal disease. The hepatic veins are poorly visualized. The differential diagnosis includes venoocclusive disease.     3.  Trace peripancreatic fluid has mildly increased and suggests acute pancreatitis.      Liver Bx:  LIVER, NEEDLE BIOPSY:  Hepatitis, severe, grade 4 /4 activity:   -Etiologic considerations include vaccine-induced autoimmune hepatitis vs. Other   -No fibrosis or neoplastic/mass lesion identified     Assessment  32 - year-old female with no significant past medical history, who had recent diagnosis  of autoimmune hepatitis.     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 Has a history of mild AST/ALT elevation in the past, may have been predisposed to AI hepatitis, which can also flare after childbirth.   Independently reviewed labs and imaging.     Initial Bx showing severe hepatitis with necrosis - felt to be autoimmune type hepatitis potentially triggered by COVID vaccination. She had a liver of elevated LFTs that improved post partum, potentially predisposing her to a flare as well.     Her LFTs have been slowly improving with steroids + imuran + ursodiol. INR improving as well. Stopped prednisone 12/2021. LFTs yadira when stopping imuran, had to restart and now is on low dose (50 mg daily) imuran.  She has had mild leukopenia with Imuran.    Started liver transplant evaluation while inpatient, given her improvement with medical therapy, closed transplant evaluation.     MRCP showing lobular appearing liver, similar to last MRI.  Underwent follow-up liver biopsy that showed  resolved hepatitis, but reticulin stain did show findings of nodular regenerative hyperplasia.  Given her history, feel the nodular regenerative hyperplasia was secondary to the initial insult, rather than Imuran use.  Even though she only had periportal fibrosis in the biopsy, essentially feel she has some noncirrhotic portal hypertension, splenomegaly.  She did not have varices on upper endoscopy September 2021.  This may also be explaining some of her leukopenia and normal cytopenia (in addition to Imuran).  I discussed that I do not feel Imuran is the primary culprit, also feel it is the best medication for her autoimmune liver disease, particularly if helping to become pregnant in the future.  Discussed that she can continue Imuran during pregnancy.  Second line treatment would be CellCept, which is not safe in pregnancy.  Additional options would be tacrolimus or cyclosporine.      She is interested  in pursuing pregnancy if this is safe.  She had an EGD that did not show varices December 2022.  We will review her imaging to ensure no evidence of a splenic artery aneurysm.  Also referring her to high risk OB to discuss risks of pregnancy.    - Repeat liver enzymes with CBC and differential in a month  - Obtain right breast ultrasound  - Continue Imuran 50 mg daily  - Continue ursodiol 250 mg twice a day.  - Refer to M to discuss pregnancy.  Recommend that she have another EGD within the year of becoming pregnant     RTC 6 months.    Ashlee Borden MD MS  Hepatology/Liver Transplant  [facility]            Again, thank you for allowing me to participate in the care of your patient.        Sincerely,        Ashlee Borden MD

## 2022-12-16 NOTE — PROGRESS NOTES
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 Dias is a 32 year old woman with a history of mild LFT elevation during pregnancy, who presents for evaluation of severe auto immune hepatitis.     Initial history:    She presented with jaundice the end of June 2021. Received her 2/2 COVID shots 5/2021. She had mild AST/ALT elevations during pregnancy, but normalized after pregnancy. She went to her PCP and got labs that showed   TBR 12.8 DBR 7.6 INR 1.6. She had a MRI 7/2/2021 that showed pericholecystic fluid. Also showed 4 \"masses\" in the liver - favored regenerative nodules. \"Slightly hyperintense on precontrast T1-weighted imaging, exhibits hypoechoic intensity on postcontrast imaging with enhancement of septations, exhibits diffusion restriction, and is hypointense on T2-weighted imaging. The largest mass is in the medial left hepatic lobe and is 7.7 x 6.5 x 6.2 cm. The right hepatic lobe is small. Much of the liver is in the left upper quadrant.\" Multiple liver cysts. Biliary system normal without dilation. Spleen was mildly enlarged with a few prominent belkis hepatic lymph nodes    Underwent liver bx 7/6 that showing severe hepatitis necrosis, thought to be autoimmune hepatitis, was started on prednisone 40 mg on July 14.  She had repeat labs done on July 18 showing worsening alkaline phosphatase and INR.  Patient also had hypotension upon her outpatient lab draw, upon second admission her white count was elevated concerning for sepsis, she was treated with antibiotics.  Her INR slightly went up to 2.2, patient was transferred to [facility] for further evaluation.    While admitted to [facility], labs were stable and LFTs slowly improving. Weaned of " steroids 12/2021    MRCP 2/2022showing lobular appearing liver and enlarged spleen - relatively unchanged since her last MRI    Interval Events  Taking imuran 50 mg daily and ursodiol 300 mg BID  - Not drinking alcohol, feeling well   - Underwent random liver bx 3/2022 showing Regenerative changes consistent with resolved severe hepatitis   .  Comment  Liver biopsy is optimal for evaluation by usual criteria and shows minimal portal or lobular inflammation. There is focal bile ductular  reaction without any evidence of cholestasis. No portal or lobular necroinflammatory activity is noted. Trichrome stain shows mild  portal fibrosis. Reticulin stain show features of regenerative nodular hyperplasia. PAS/PAS-D stains are negative for any significant  intracytoplasmic accumulations. Iron stain show no significant iron deposition.  Overall the changes seen in this biopsy are that of resolved severe hepatitis with mild portal fibrosis and features of nodular  regenerative hyperplasia. There is no evidence of active liver disease.    - EGD 9/2022 without varices     ROS: 14 point ROS negative except for positives noted in HPI.    PMHx:  Past Medical History:   Diagnosis Date     Autoimmune hepatitis (H)      Gestational diabetes    Autoimmune hepatitis potentially triggered by COVID vaccination    PSHx:   Vaginal delivery  Liver bx    FamHx:  Family History   Problem Relation Age of Onset     Celiac Disease Sister    Father had coronary artery disease, sister has celiac disease, no other autoimmune disorders in family.  Mother in law had liver transplant for ALD    SocHx:  Social History     Socioeconomic History     Marital status:      Spouse name: Not on file     Number of children: Not on file     Years of education: Not on file     Highest education level: Not on file   Occupational History     Not on file   Tobacco Use     Smoking status: Never     Smokeless tobacco: Never   Substance and Sexual Activity      Alcohol use: Not Currently     Drug use: Never     Sexual activity: Not on file   Other Topics Concern     Parent/sibling w/ CABG, MI or angioplasty before 65F 55M? Not Asked   Social History Narrative    Lives with her , Jose R and their daughter Tesha.      Social Determinants of Health     Financial Resource Strain: Not on file   Food Insecurity: Not on file   Transportation Needs: Not on file   Physical Activity: Not on file   Stress: Not on file   Social Connections: Not on file   Intimate Partner Violence: Not on file   Housing Stability: Not on file       Medications:  Current Outpatient Medications   Medication     azaTHIOprine 75 MG TABS     azaTHIOprine 75 MG TABS     calcium gluconate 500 MG tablet     cholecalciferol 25 MCG (1000 UT) TABS     ursodiol (ACTIGALL) 250 MG tablet     No current facility-administered medications for this visit.       Allergies:  Allergies   Allergen Reactions     Penicillins GI Disturbance       Objective:  There were no vitals taken for this visit.  Constitutional: pleasant woman in NAD  Eyes: non icteric  Respiratory: Normal respiratory excursion   MSK: normal range of motion of visualized extremities  Abd: Non distended  Skin: No jaundice  Psychiatric: normal mood and orientation    Labs: reviewed in EHR    AMA negative  ROLANDO + 1:640  F actin 9  IgG normal  Anti LKM negative  SLA negative    Hepatitis A IgG positive, IgM negative  Hepatitis B Sag negative, core negative, ab immune  TPMT normal 30.9 (22-44)    MRCP 2/19/2022    Liver: Architectural distortion of the liver parenchyma, lobulated  with nodular contours, capsular retraction, atrophic right lobe,  elongated left hepatic lobe, scattered liver cysts and nodular areas  of abnormal decreased enhancement in the arterial, portal venous and  delayed phases. Nodular foci demonstrate mild increased precontrast T1  signal, decreased T2 signal and no restricted diffusion in the present  study. Mild prominent  intrahepatic biliary tree dilation. Unremarkable  common bile duct.     Gallbladder: No cholelithiasis. No gallbladder wall thickening. No  pericholecystic fluid.     Spleen: Splenule. Enlarged spleen measuring 16 cm in craniocaudad  dimension. No focal lesions.     Kidneys: No focal lesions. No hydronephrosis.     Adrenal glands: No nodules.     Pancreas: Preserved increased precontrast T1 signal. Main pancreatic  duct is not dilated. No focal lesions.     Bowel: No dilated loops of bowel.     Lymph nodes: Prominent belkis hepatis lymph nodes, likely reactive.  Additional prominent retroperitoneal and mesenteric lymph nodes,  stable.     Blood vessels: Patent liver vasculature. Patent abdominal vasculature.  No abdominal aortic aneurysm.     Lung bases: Unremarkable.     Bones and soft tissues: No aggressive bone lesions.     Mesentery and abdominal wall: Unremarkable.     Ascites: No ascites.     IMPRESSION: In this patient with history of altered immune hepatitis  and biopsy-proven hepatic parenchyma necrosis:  1. Lobulated nodular liver parenchyma with capsular retraction and  nodular areas of hypoenhancement, grossly stable from 7/3/2021 with no  convincing acute MRI findings on the present study.  2. Splenomegaly. No ascites.  3. Additional incidental findings as described above.    RUQ US 7/22/2021    GALLBLADDER: Not evaluated     LIVER: Partially visualized liver lesions, better evaluated on the recent MRI.       IVC: Normal where visualized.     ABDOMINAL DUPLEX: Technically limited due to overlying bowel gas and body habitus. The middle hepatic vein is not visualized. The left hepatic vein is patent with flow in the normal direction. The right hepatic vein is diminutive but patent with flow in the normal direction.. The hepatic artery, IVC, portal veins, and splenic vein are patent with flow in the normal direction.     IMPRESSION:   1.  Technically limited Doppler evaluation. The middle hepatic vein is  not visualized. The left hepatic vein is patent with flow in the normal direction. The right hepatic vein is diminutive but patent with flow in the normal direction. The hepatic artery, IVC, portal veins, and splenic vein are patent with flow in the normal direction.     MRI ABD 7/3/21:    IMPRESSION:   1.  Multiple liver masses are indeterminate. Regenerative nodules are favored. Metastatic disease is less likely given the patient's age. Atypical presentation of a primary liver mass is possible. Follow-up MRI could be considered. Biopsy could be considered.   2.  Mild splenomegaly is indeterminate. Portal hypertension could contribute to splenomegaly.   3.  Mild pericholecystic fluid is nonspecific. Acute cholecystitis is not excluded. There is no bile duct dilatation. No biliary filling defects or strictures identified.         MRI ABD 7/22/21:  IMPRESSION:   1.  Edematous gallbladder wall thickening has increased. Trace pericholecystic fluid is unchanged. Findings may indicate acalculus cholecystitis. Consider nuclear medicine hepatobiliary scan.     2.  Stable atypical appearance of the liver consistent with nonspecific heterogeneous parenchymal disease. The hepatic veins are poorly visualized. The differential diagnosis includes venoocclusive disease.     3.  Trace peripancreatic fluid has mildly increased and suggests acute pancreatitis.      Liver Bx:  LIVER, NEEDLE BIOPSY:  Hepatitis, severe, grade 4 /4 activity:   -Etiologic considerations include vaccine-induced autoimmune hepatitis vs. Other   -No fibrosis or neoplastic/mass lesion identified     Assessment  32 - year-old female with no significant past medical history, who had recent diagnosis of autoimmune hepatitis.     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 Has a history of mild AST/ALT elevation in the past, may have been predisposed to AI hepatitis, which can also flare after childbirth.   Independently  reviewed labs and imaging.     Initial Bx showing severe hepatitis with necrosis - felt to be autoimmune type hepatitis potentially triggered by COVID vaccination. She had a liver of elevated LFTs that improved post partum, potentially predisposing her to a flare as well.     Her LFTs have been slowly improving with steroids + imuran + ursodiol. INR improving as well. Stopped prednisone 12/2021. LFTs yadira when stopping imuran, had to restart and now is on low dose (50 mg daily) imuran.  She has had mild leukopenia with Imuran.    Started liver transplant evaluation while inpatient, given her improvement with medical therapy, closed transplant evaluation.     MRCP showing lobular appearing liver, similar to last MRI.  Underwent follow-up liver biopsy that showed  resolved hepatitis, but reticulin stain did show findings of nodular regenerative hyperplasia.  Given her history, feel the nodular regenerative hyperplasia was secondary to the initial insult, rather than Imuran use.  Even though she only had periportal fibrosis in the biopsy, essentially feel she has some noncirrhotic portal hypertension, splenomegaly.  She did not have varices on upper endoscopy September 2021.  This may also be explaining some of her leukopenia and normal cytopenia (in addition to Imuran).  I discussed that I do not feel Imuran is the primary culprit, also feel it is the best medication for her autoimmune liver disease, particularly if helping to become pregnant in the future.  Discussed that she can continue Imuran during pregnancy.  Second line treatment would be CellCept, which is not safe in pregnancy.  Additional options would be tacrolimus or cyclosporine.      She is interested in pursuing pregnancy if this is safe.  She had an EGD that did not show varices December 2022.  We will review her imaging to ensure no evidence of a splenic artery aneurysm.  Also referring her to high risk OB to discuss risks of pregnancy.    - Repeat  liver enzymes with CBC and differential in a month  - Obtain right breast ultrasound  - Continue Imuran 50 mg daily  - Continue ursodiol 250 mg twice a day.  - Refer to MFM to discuss pregnancy.  Recommend that she have another EGD within the year of becoming pregnant     RTC 6 months.    Ashlee Borden MD MS  Hepatology/Liver Transplant  [facility]

## 2022-12-28 ENCOUNTER — TELEPHONE (OUTPATIENT)
Dept: GASTROENTEROLOGY | Facility: CLINIC | Age: 32
End: 2022-12-28

## 2022-12-28 NOTE — TELEPHONE ENCOUNTER
Left Voicemail (1st Attempt) for the patient to call back and schedule the following:    Appointment type: labs & 6mo followup  Provider: Dr. Ashlee Borden  Return date: around 6/16/2023  Specialty phone number: [phone]  Additional appointment(s) needed: labs same day or up to 7 days prior  Additonal Notes: n/a

## 2023-01-02 ENCOUNTER — LAB (OUTPATIENT)
Dept: LAB | Facility: CLINIC | Age: 33
End: 2023-01-02
Payer: COMMERCIAL

## 2023-01-02 DIAGNOSIS — K75.4 AUTOIMMUNE HEPATITIS (H): ICD-10-CM

## 2023-01-02 LAB
AFP SERPL-MCNC: 1.9 NG/ML
ALBUMIN SERPL BCG-MCNC: 3.8 G/DL (ref 3.5–5.2)
ALP SERPL-CCNC: 99 U/L (ref 35–104)
ALT SERPL W P-5'-P-CCNC: 7 U/L (ref 10–35)
AST SERPL W P-5'-P-CCNC: 20 U/L (ref 10–35)
BASOPHILS # BLD AUTO: 0 10E3/UL (ref 0–0.2)
BASOPHILS NFR BLD AUTO: 0 %
BILIRUB DIRECT SERPL-MCNC: 0.5 MG/DL (ref 0–0.3)
BILIRUB SERPL-MCNC: 1.7 MG/DL
EOSINOPHIL # BLD AUTO: 0.1 10E3/UL (ref 0–0.7)
EOSINOPHIL NFR BLD AUTO: 1 %
ERYTHROCYTE [DISTWIDTH] IN BLOOD BY AUTOMATED COUNT: 14.5 % (ref 10–15)
HCT VFR BLD AUTO: 37 % (ref 35–47)
HGB BLD-MCNC: 12.9 G/DL (ref 11.7–15.7)
IMM GRANULOCYTES # BLD: 0 10E3/UL
IMM GRANULOCYTES NFR BLD: 0 %
LYMPHOCYTES # BLD AUTO: 0.8 10E3/UL (ref 0.8–5.3)
LYMPHOCYTES NFR BLD AUTO: 17 %
MCH RBC QN AUTO: 31.9 PG (ref 26.5–33)
MCHC RBC AUTO-ENTMCNC: 34.9 G/DL (ref 31.5–36.5)
MCV RBC AUTO: 92 FL (ref 78–100)
MONOCYTES # BLD AUTO: 0.3 10E3/UL (ref 0–1.3)
MONOCYTES NFR BLD AUTO: 6 %
NEUTROPHILS # BLD AUTO: 3.6 10E3/UL (ref 1.6–8.3)
NEUTROPHILS NFR BLD AUTO: 76 %
PLATELET # BLD AUTO: 94 10E3/UL (ref 150–450)
PROT SERPL-MCNC: 6.4 G/DL (ref 6.4–8.3)
RBC # BLD AUTO: 4.04 10E6/UL (ref 3.8–5.2)
WBC # BLD AUTO: 4.7 10E3/UL (ref 4–11)

## 2023-01-02 PROCEDURE — 36415 COLL VENOUS BLD VENIPUNCTURE: CPT

## 2023-01-02 PROCEDURE — 80076 HEPATIC FUNCTION PANEL: CPT

## 2023-01-02 PROCEDURE — 82105 ALPHA-FETOPROTEIN SERUM: CPT

## 2023-01-02 PROCEDURE — 85025 COMPLETE CBC W/AUTO DIFF WBC: CPT

## 2023-02-10 ENCOUNTER — PRE VISIT (OUTPATIENT)
Dept: MATERNAL FETAL MEDICINE | Facility: CLINIC | Age: 33
End: 2023-02-10
Payer: COMMERCIAL

## 2023-02-13 NOTE — PROGRESS NOTES
"Maternal-Fetal Medicine Consultation    Yaritza Dias  : 1990  MRN: [id number]    REFERRAL:  Yaritza Dias is a 32 year old sent by Dr. Borden from [facility] for MFM consultation.    HPI:  Yaritza Dias is a 32 year old  here for MFM preconception consultation regarding history of autoimmune hepatitis. She is here by herself.    In terms of her history of autoimmune hepatitis, she initially had jaundice in 2021 after she had her COVID vaccine in May 2021. Her work-up was notable for   TBR 12.8 DBR 7.6 INR 1.6. She had an MRI 2021 that showed liver masses and cysts and underwent liver biopsy that showed severe hepatitis necrosis, thought to be autoimmune hepatitis, was started on prednisone 40 mg on . Weaned of steroids 2021. She underwent MRCP in 2022 which was relatively unchanged since her last MRI. She has since been stable on imuran 50 mg daily and ursodiol 300 mg BID. Underwent random liver bx 3/2022 showing \"regenerative changes consistent with resolved severe hepatitis\" and EGD 2022 showed no varices.    She reports she had one minor flare in May of last year. She thinks her daughter had a cold at the time and that was her trigger. Her symptoms resolved with prednisone. Since then she has not had any symptoms.    She also, importantly, notes that she has not had a period since she was diagnosed with autoimmune hepatitis 2 years ago. She denies menopausal symptoms, hot flashes, vaginal dryness, dyspareunia.     Today she reports feeling well. She is wondering about why she is not having a period and if it would be safe for her to get pregnant.    Obstetrics History:  OB History    Para Term  AB Living   1 1 1 0 0 1   SAB IAB Ectopic Multiple Live Births   0 0 0 0 1      # Outcome Date GA Lbr Eric/2nd Weight Sex Delivery Anes PTL Lv   1 Term 21 38w6d 05:53 / 02:33 2.91 kg (6 lb 6.7 oz) F Vag-Spont  N MIKEL      Name: " SNEHA PEPE      Apgar1: 8  Apgar5: 9       Gynecologic History:  - Menstral history: regular, LMP: No LMP recorded.  - Last Pap: NILM 4/25/2019  - Denies any history of abnormal pap smears  - Denies prior cervical surgery or procedures  - Denies any history of frequent UTIs, vaginal infections, or STIs    Past Medical History:  Past Medical History:   Diagnosis Date     Autoimmune hepatitis (H)      Gestational diabetes        Past Surgical History:  Past Surgical History:   Procedure Laterality Date     ESOPHAGOSCOPY, GASTROSCOPY, DUODENOSCOPY (EGD), COMBINED N/A 9/8/2022    Procedure: ESOPHAGOGASTRODUODENOSCOPY (EGD);  Surgeon: Ashlee Borden MD;  Location: [facility] [place] [place] OR      LIVER BIOPSY PERCUTANEOUS  3/18/2022       Current Medications:  Prior to Admission medications    Medication Sig Last Dose Taking? Auth Provider Long Term End Date   azaTHIOprine (IMURAN) 50 MG tablet Take 1 tablet (50 mg) by mouth daily   Ashlee Borden MD Yes 12/16/23   azaTHIOprine 75 MG TABS Take 100 mg by mouth daily  Patient taking differently: Take 50 mg by mouth daily   Ashlee Borden MD Yes    calcium gluconate 500 MG tablet Take 1,000 mg by mouth daily    Reported, Patient     cholecalciferol 25 MCG (1000 UT) TABS Take 1,000 Units by mouth daily    Reported, Patient     ursodiol (ACTIGALL) 250 MG tablet Take 250 mg by mouth 2 times daily   Reported, Patient         Allergies:  Penicillins    Social History:   Denies use of alcohol, drugs or smoking    Family History:  No significant family history   She specifically denies a family history of motor/intellectual impairment, stillbirth, genetic or chromosome abnormalities or congenital anomalies.     ROS:  10-point ROS negative except as in HPI     PHYSICAL EXAM:  /84 (BP Location: Left arm, Patient Position: Sitting, Cuff Size: Adult Regular)   Pulse 100   Resp 18   SpO2 99%      Due to the consultative nature of today's visit remainder of physical exam  deferred    Labs  Component      Latest Ref Rng & Units 2021   Protein Total      6.4 - 8.3 g/dL 4.6 (L) 5.2 (L) 6.4   Albumin      3.5 - 5.2 g/dL   3.8   Bilirubin Total      <=1.2 mg/dL 8.2 (H) 5.9 (H) 1.7 (H)   Alkaline Phosphatase      35 - 104 U/L 156 (H) 158 (H) 99   AST      10 - 35 U/L 140 (H) 126 (H) 20   ALT      10 - 35 U/L 131 (H) 103 (H) 7 (L)   Bilirubin Direct      0.00 - 0.30 mg/dL   0.50 (H)     Component      Latest Ref Rng & Units 2021 3/18/2022 2022 2023   WBC      4.0 - 11.0 10e3/uL 8.0  2.6 (L) 4.7   RBC Count      3.80 - 5.20 10e6/uL 4.24  3.68 (L) 4.04   Hemoglobin      11.7 - 15.7 g/dL 12.6  11.7 12.9   Hematocrit      35.0 - 47.0 % 38.4  34.0 (L) 37.0   MCV      78 - 100 fL 91  92 92   MCH      26.5 - 33.0 pg 29.7  31.8 31.9   MCHC      31.5 - 36.5 g/dL 32.8  34.4 34.9   RDW      10.0 - 15.0 % 18.0 (H)  14.7 14.5   Platelet Count      150 - 450 10e3/uL 102 (L) 118 (L) 92 (L) 94 (L)       Other Imaging:   MRI ABDOMEN (2022):  1. Lobulated nodular liver parenchyma with capsular retraction and nodular areas of hypoenhancement, grossly stable from 7/3/2021 with no convincing acute MRI findings on the present study.  2. Splenomegaly. No ascites.  3. Additional incidental findings as described above.    ASSESSMENT/PLAN:  Yaritza Dias is a 32 year old  here for MFM preconception consultation regarding:     Autoimmune Hepatitis (AIH)  Today we reviewed that most cases of chronic nonviral hepatitis in reproductive-age women result from AIH. AIH is a diagnosis of exclusion, and serologic evidence of antinuclear and anti-smooth muscle antibodies is common. In younger patients with AIH, antibodies that cross-react with liver-kidney microsomes may be detectable.    Amenorrhea and infertility are common in affected women and represent the classic phenotype of disease. Treatment with immunosuppressive regimens commonly stalls progression of disease and  results in renewed fertility. The most commonly used immunosuppressive agents are prednisolone and azathioprine, although cyclosporin and tacrolimus may be used in selected circumstances. If disease activity is well controlled, most women have a good prognosis for pregnancy. However, approximately 33% of patients have an AIH flare after delivery.    A large, single-center study of 81 women with AIH found that 33 (41%) pregnancies occurred in the contest of cirrhosis. At conception, 61 patients (75%) were on therapy for AIH. The live birth rate was 73% (59 of 81). Prematurity affected 12 (20%) of 59 pregnancies, and 6 infants (11%) required admission to a level 1  care unit. The overall maternal complication rate was 38% (31 of 81). A flare of disease activity occurred in 26 (33%) of 81 pregnancies. A serious maternal adverse event (i.e., death or need for liver transplantation) during or within 12 months of delivery or hepatic decompensation during or within 3 months of delivery occurred with 9 pregnancies (11%) and was more common among women with cirrhosis (P = .028).    Maternal therapy had no significant impact on the live birth rate, termination rate, or gestational period. AIH flares were more likely in patients who were not on therapy or who had a disease flare in the year before conception. Patients who had a flare associated with pregnancy were more likely to decompensate because of liver dysfunction. Given the reassuring data about the use of azathioprine during pregnancy and breast-feeding, women with AIH should continue treatment, because the risk of a flare outweighs the potential risks of treatment.    We agree with the GI recommendation to continue Azathioprine (Imuran) and Ursidiol, and that the other options including CellCept, tacrolimus, and cyclosporine would not be recommended in pregnancy. We also agreed on the recommendation for her to have a repeat EGD around the time of pregnancy,  and would defer to our GI colleagues about the timing of this study during her future pregnancy.    We reviewed with Yaritza that while pregnancy would not be contraindicated, that she would certainly be a high risk pregnancy as discussed above.  We reviewed the need to ensure that a splenic artery aneurysm is not present, as she is at risk for this given the splenomegaly and resultant mild thrombocytopenia.  She states she will schedule her abdominal US to evaluate this.      With regard to her amenorrhea, we discussed meeting with Ob/Gyn to evaluated her for secondary amenorrhea in the setting of autoimmune hepatitis.  We will facilitate this referral. We will also plan to check an SSA antibody given history of autoimmune disorder.  Yaritza states she will have this drawn with her next set of surveillance labs.     Recommendations:    Medications    Continue Imuran 50 mg daily    Continue ursodiol 250 mg twice a day.    Laboratory evaluation     Repeat LFTs and CBC with GI    Preconception Recommendations    Aim for 1 year without AIH flares prior to attempting conception     Referral to Ob/Gyn for evaluation of secondary amenorrhea    Repeat EGD with GI     Complete abdominal US to evaluate for splenic artery aneuryism    Maternal antepartum management     Continue to work closely with her Liver/GI specialist    Repeat EGD in 2nd or 3rd trimester to evaluate for esophageal varicies    Source:   1) Alfred Snyder, and Alfred Bowen.  Chronic Liver Disease in Pregnancy.  Umm's Maternal-Fetal Medicine: Principles and Practice, Elsevier, [place], 2014.     The patient was seen and evaluated with Dr. Rubio    Thank you for allowing us to participate in the care of your patient. Please do not hesitate to contact us if you have further questions regarding the management of your patient.     Lita Ca MD  Ob/Gyn Resident, PGY-2  02/16/2023 9:45 AM    Physician Attestation   IDavon,  MD, saw this patient and agree with the findings and plan of care as documented in the note.      Items personally reviewed/procedural attestation: vitals, labs and imaging and agree with the interpretation documented in the note.    Davon Rubio MD     I spent a total of 60 minutes on the date of this encounter including preparing to see the patient (reviewing medical records/tests), in direct face-to-face contact with the patient during her visit with the majority  spent counseling and discussing the plan of care and documenting the visit in the electronic medical record.  Please see note for details.

## 2023-02-16 ENCOUNTER — OFFICE VISIT (OUTPATIENT)
Dept: MATERNAL FETAL MEDICINE | Facility: CLINIC | Age: 33
End: 2023-02-16
Attending: OBSTETRICS & GYNECOLOGY
Payer: COMMERCIAL

## 2023-02-16 ENCOUNTER — TELEPHONE (OUTPATIENT)
Dept: OBGYN | Facility: CLINIC | Age: 33
End: 2023-02-16
Payer: COMMERCIAL

## 2023-02-16 VITALS
SYSTOLIC BLOOD PRESSURE: 131 MMHG | DIASTOLIC BLOOD PRESSURE: 84 MMHG | OXYGEN SATURATION: 99 % | HEART RATE: 100 BPM | RESPIRATION RATE: 18 BRPM

## 2023-02-16 DIAGNOSIS — Z31.69 ENCOUNTER FOR PRECONCEPTION CONSULTATION: Primary | ICD-10-CM

## 2023-02-16 PROCEDURE — 99205 OFFICE O/P NEW HI 60 MIN: CPT | Mod: GC | Performed by: OBSTETRICS & GYNECOLOGY

## 2023-02-16 PROCEDURE — 99212 OFFICE O/P EST SF 10 MIN: CPT | Performed by: OBSTETRICS & GYNECOLOGY

## 2023-02-16 ASSESSMENT — PAIN SCALES - GENERAL: PAINLEVEL: NO PAIN (0)

## 2023-02-16 NOTE — TELEPHONE ENCOUNTER
[facility] Call Center    Phone Message    May a detailed message be left on voicemail: yes     Reason for Call: Appointment Intake    Referring Provider Name: Sandrita Juarez RN  Diagnosis and/or Symptoms: Autoimmune hepatitis (H)    Patient being referred for Autoimmune hepatitis (H). Sending encounter message because Dx is not listed in guidelines for scheduling. Please review and follow-up with patient for scheduling. Thank you!    Action Taken: Other: [facility]    Travel Screening: Not Applicable

## 2023-02-16 NOTE — NURSING NOTE
Yaritza seen in clinic today for Preconception MFM Consult d/t autoimmune hepatitis. VS obtained. Meds and allergies reviewed. Dr. Ca and Dr. Rubio met with pt and discussed POC, see separate note. Plan for pt to return to see Dr. Ca in her clinic on 3/8/23. Labs ordered for SSA to be drawn with next liver panel. Pt discharged stable and ambulatory.      Lindsey Aguila RN

## 2023-03-08 ENCOUNTER — OFFICE VISIT (OUTPATIENT)
Dept: OBGYN | Facility: CLINIC | Age: 33
End: 2023-03-08
Payer: COMMERCIAL

## 2023-03-08 VITALS
BODY MASS INDEX: 32.95 KG/M2 | DIASTOLIC BLOOD PRESSURE: 96 MMHG | SYSTOLIC BLOOD PRESSURE: 136 MMHG | HEIGHT: 64 IN | HEART RATE: 106 BPM | WEIGHT: 193 LBS

## 2023-03-08 DIAGNOSIS — N91.2 AMENORRHEA: Primary | ICD-10-CM

## 2023-03-08 PROCEDURE — 99203 OFFICE O/P NEW LOW 30 MIN: CPT | Mod: GE | Performed by: OBSTETRICS & GYNECOLOGY

## 2023-03-08 PROCEDURE — 99213 OFFICE O/P EST LOW 20 MIN: CPT

## 2023-03-08 ASSESSMENT — PAIN SCALES - GENERAL: PAINLEVEL: NO PAIN (0)

## 2023-03-08 NOTE — PATIENT INSTRUCTIONS
Thank you for trusting us with your care!     If you need to contact us for questions about:  Symptoms, Scheduling & Medical Questions; Non-urgent (2-3 day response) Chase message, Urgent (needing response today) [phone] (if after 3:30pm next day response)   Prescriptions: Please call your Pharmacy   Billing: [place] [phone] or [facility]:[phone]

## 2023-03-08 NOTE — PROGRESS NOTES
[facility]  Gynecology Visit    Reason for Visit: Amenorrhea in the setting of autoimmune hepatitis    HPI:    Yaritza Dias is a 32 year old , here to discuss amenorrhea in the setting of autoimmune hepatitis.    She is hoping to become pregnant in the near future, and was recently seen in the [facility] clinic for diagnosis of autoimmune hepatitis (AIH). She was advised that it would be safe for her to attempt pregnancy since her liver appears to be healing and she does not have any evidence of esophageal varices. Her AIH was thought to be triggered by her COVID vaccine in May 2021, and she was treated with prednisone and has been stable on imuran and ursidiol. Her most recent biopsy and liver enzyme levels were normal, though she continues to have mild thrombocytopenia. Her next appointment and ultrasound with hepatology is in 2023. (Please see [facility] consult note for further details). She was referred to Ob/Gyn because she has not had a period since 2021 after she was diagnosed with AIH in 2021.    In terms of her menstrual history, she previously had very regular cycles, typically every 28-30 days with flow for 4-5 days. She denies a history of heavy or painful menses. She has had one prior pregnancy that resulted in a full term , and denies history of miscarriage. She is not currently using any form of birth control.    Overall she reports feeling well. She denies recent weight gain or loss, fevers, chills, chest pain, shortness of breath, diarrhea, constipation, dysuria. She denies symptoms of menopause including hot flashes, vaginal dryness, mood swings. She recently took a pregnancy test and states that it was negative.      GYN History  Age at menarche: 12  Length of menstrual cycle: every 28 days prior to 2021  Duration of menses: 4 to 5 days  Heavy bleeding: no  Pain with menses: no  LMP: 2021  Sexually active: yes, with male partner  History of STIs: denies  Pap  Smears:   No results found for: PAP  History of abnormal paps: denies  Contraception: none currently    OBHx  OB History    Para Term  AB Living   1 1 1 0 0 1   SAB IAB Ectopic Multiple Live Births   0 0 0 0 1      # Outcome Date GA Lbr Eric/2nd Weight Sex Delivery Anes PTL Lv   1 Term 21 38w6d 05:53 / 02:33 2.91 kg (6 lb 6.7 oz) F Vag-Spont  N MIKEL      Name: SNEHA PEPE      Apgar1: 8  Apgar5: 9       Past Medical History:   Diagnosis Date     Autoimmune hepatitis (H)      Gestational diabetes        Past Surgical History:   Procedure Laterality Date     ESOPHAGOSCOPY, GASTROSCOPY, DUODENOSCOPY (EGD), COMBINED N/A 2022    Procedure: ESOPHAGOGASTRODUODENOSCOPY (EGD);  Surgeon: Ashlee Borden MD;  Location: [facility] OR     IR LIVER BIOPSY PERCUTANEOUS  3/18/2022         Current Outpatient Medications:      azaTHIOprine (IMURAN) 50 MG tablet, Take 1 tablet (50 mg) by mouth daily, Disp: 90 tablet, Rfl: 3     calcium gluconate 500 MG tablet, Take 1,000 mg by mouth daily , Disp: , Rfl:      cholecalciferol 25 MCG (1000 UT) TABS, Take 1,000 Units by mouth daily , Disp: , Rfl:      ursodiol (ACTIGALL) 250 MG tablet, Take 250 mg by mouth 2 times daily, Disp: , Rfl:     Allergies   Allergen Reactions     Penicillins GI Disturbance       Family History   Problem Relation Age of Onset     Celiac Disease Sister        Social History     Socioeconomic History     Marital status:      Spouse name: Not on file     Number of children: Not on file     Years of education: Not on file     Highest education level: Not on file   Occupational History     Not on file   Tobacco Use     Smoking status: Never     Smokeless tobacco: Never   Substance and Sexual Activity     Alcohol use: Not Currently     Drug use: Never     Sexual activity: Not on file   Other Topics Concern     Parent/sibling w/ CABG, MI or angioplasty before 65F 55M? Not Asked   Social History Narrative    Lives with her , Jose R  and their daughter Tesha.      Social Determinants of Health     Financial Resource Strain: Not on file   Food Insecurity: Not on file   Transportation Needs: Not on file   Physical Activity: Not on file   Stress: Not on file   Social Connections: Not on file   Intimate Partner Violence: Not on file   Housing Stability: Not on file       ROS: 10-Point ROS negative except as noted in HPI    Physical Exam  There were no vitals taken for this visit.  Gen: Well-appearing, NAD  HEENT: Normocephalic, atraumatic  Neck: Thyroid is not enlarged, no appreciable masses palpated. Non-tender  CV:  RRR, no m/r/g auscultated  Pulm: CTAB, no w/r/r auscultated  Abd: Soft, non-tender, non-distended  Ext: No LE edema, extremities warm and well perfused    Assessment/Plan:  Yaritza Dias is a 32 year old  female here for amenorrhea likely in the setting of autoimmune hepatitis. Her LMP was 2021 and she has a recently negative pregnancy test. Review of literature suggests that patient with autoimmune hepatitis or cirrhosis do restart menstrual cycles after liver transplant, though no clear timeline was found for patients who do not undergo transplant. Given that this patient's LFT's and liver biopsy shows signs of improvement of her AIH, it is possible she may begin cycling again and subsequently become pregnant on her own. Today we discussed checking hormone levels and depending on the results of these and her desired timeline for pregnancy, offered referral to [facility].    Amenorrhea  Autoimmune Hepatitis  - LMP 2021  - Negative recent pregnancy test and OPKs  - Labs ordered: Progesterone, Estradiol (E2), FHS, LH, and AMH  - Referral sheet for [facility] clinics provided today    Return to clinic after labs.    Staffed with Dr. Ricky Ca MD  Ob/Gyn Resident, PGY-2  2023 1:08 PM     The Patient was seen in [facility] by JUAQUIN CA.  I reviewed the history & exam. Assessment and plan  were jointly made.    Elena García MD

## 2023-05-30 DIAGNOSIS — K75.4 AUTOIMMUNE HEPATITIS (H): Primary | ICD-10-CM

## 2023-06-09 ENCOUNTER — HOSPITAL ENCOUNTER (OUTPATIENT)
Dept: ULTRASOUND IMAGING | Facility: HOSPITAL | Age: 33
Discharge: HOME | End: 2023-06-09
Attending: STUDENT IN AN ORGANIZED HEALTH CARE EDUCATION/TRAINING PROGRAM | Admitting: STUDENT IN AN ORGANIZED HEALTH CARE EDUCATION/TRAINING PROGRAM
Payer: COMMERCIAL

## 2023-06-09 DIAGNOSIS — K76.6 PORTAL HYPERTENSION (H): ICD-10-CM

## 2023-06-09 DIAGNOSIS — K75.4 AUTOIMMUNE HEPATITIS (H): Primary | ICD-10-CM

## 2023-06-09 DIAGNOSIS — K75.4 AUTOIMMUNE HEPATITIS (H): ICD-10-CM

## 2023-06-09 PROCEDURE — 76705 ECHO EXAM OF ABDOMEN: CPT

## 2023-06-12 DIAGNOSIS — K75.4 AUTOIMMUNE HEPATITIS (H): ICD-10-CM

## 2023-06-12 RX ORDER — AZATHIOPRINE 50 MG/1
50 TABLET ORAL DAILY
Qty: 90 TABLET | Refills: 3 | Status: SHIPPED | OUTPATIENT
Start: 2023-06-12 | End: 2024-06-12

## 2023-06-14 ENCOUNTER — LAB (OUTPATIENT)
Dept: LAB | Facility: CLINIC | Age: 33
End: 2023-06-14
Payer: COMMERCIAL

## 2023-06-14 DIAGNOSIS — K75.4 AUTOIMMUNE HEPATITIS (H): ICD-10-CM

## 2023-06-14 DIAGNOSIS — Z31.69 ENCOUNTER FOR PRECONCEPTION CONSULTATION: ICD-10-CM

## 2023-06-14 LAB
AFP SERPL-MCNC: 2.7 NG/ML
ALBUMIN SERPL BCG-MCNC: 3.8 G/DL (ref 3.5–5.2)
ALP SERPL-CCNC: 75 U/L (ref 35–104)
ALT SERPL W P-5'-P-CCNC: 6 U/L (ref 0–50)
ANION GAP SERPL CALCULATED.3IONS-SCNC: 10 MMOL/L (ref 7–15)
AST SERPL W P-5'-P-CCNC: 19 U/L (ref 0–45)
BILIRUB DIRECT SERPL-MCNC: 0.3 MG/DL (ref 0–0.3)
BILIRUB SERPL-MCNC: 0.9 MG/DL
BUN SERPL-MCNC: 9.2 MG/DL (ref 6–20)
CALCIUM SERPL-MCNC: 9.1 MG/DL (ref 8.6–10)
CHLORIDE SERPL-SCNC: 106 MMOL/L (ref 98–107)
CREAT SERPL-MCNC: 0.72 MG/DL (ref 0.51–0.95)
DEPRECATED HCO3 PLAS-SCNC: 25 MMOL/L (ref 22–29)
ERYTHROCYTE [DISTWIDTH] IN BLOOD BY AUTOMATED COUNT: 14 % (ref 10–15)
GFR SERPL CREATININE-BSD FRML MDRD: >90 ML/MIN/1.73M2
GLUCOSE SERPL-MCNC: 91 MG/DL (ref 70–99)
HCT VFR BLD AUTO: 38.8 % (ref 35–47)
HGB BLD-MCNC: 13.3 G/DL (ref 11.7–15.7)
INR PPP: 1.17 (ref 0.85–1.15)
MCH RBC QN AUTO: 30.4 PG (ref 26.5–33)
MCHC RBC AUTO-ENTMCNC: 34.3 G/DL (ref 31.5–36.5)
MCV RBC AUTO: 89 FL (ref 78–100)
PLATELET # BLD AUTO: 103 10E3/UL (ref 150–450)
POTASSIUM SERPL-SCNC: 4.6 MMOL/L (ref 3.4–5.3)
PROT SERPL-MCNC: 6.5 G/DL (ref 6.4–8.3)
RBC # BLD AUTO: 4.37 10E6/UL (ref 3.8–5.2)
SODIUM SERPL-SCNC: 141 MMOL/L (ref 136–145)
WBC # BLD AUTO: 2.6 10E3/UL (ref 4–11)

## 2023-06-14 PROCEDURE — 36415 COLL VENOUS BLD VENIPUNCTURE: CPT

## 2023-06-14 PROCEDURE — 86235 NUCLEAR ANTIGEN ANTIBODY: CPT

## 2023-06-14 PROCEDURE — 82248 BILIRUBIN DIRECT: CPT

## 2023-06-14 PROCEDURE — 80053 COMPREHEN METABOLIC PANEL: CPT

## 2023-06-14 PROCEDURE — 85027 COMPLETE CBC AUTOMATED: CPT

## 2023-06-14 PROCEDURE — 82105 ALPHA-FETOPROTEIN SERUM: CPT

## 2023-06-14 PROCEDURE — 85610 PROTHROMBIN TIME: CPT

## 2023-06-15 ENCOUNTER — HOSPITAL ENCOUNTER (OUTPATIENT)
Dept: MRI IMAGING | Facility: HOSPITAL | Age: 33
Discharge: HOME | End: 2023-06-15
Attending: STUDENT IN AN ORGANIZED HEALTH CARE EDUCATION/TRAINING PROGRAM | Admitting: STUDENT IN AN ORGANIZED HEALTH CARE EDUCATION/TRAINING PROGRAM
Payer: COMMERCIAL

## 2023-06-15 DIAGNOSIS — K75.4 AUTOIMMUNE HEPATITIS (H): ICD-10-CM

## 2023-06-15 DIAGNOSIS — K76.6 PORTAL HYPERTENSION (H): ICD-10-CM

## 2023-06-15 LAB
ENA SS-A AB SER IA-ACNC: <0.5 U/ML
ENA SS-A AB SER IA-ACNC: NEGATIVE

## 2023-06-15 PROCEDURE — 74183 MRI ABD W/O CNTR FLWD CNTR: CPT

## 2023-06-15 PROCEDURE — 255N000002 HC RX 255 OP 636: Performed by: STUDENT IN AN ORGANIZED HEALTH CARE EDUCATION/TRAINING PROGRAM

## 2023-06-15 PROCEDURE — A9585 GADOBUTROL INJECTION: HCPCS | Performed by: STUDENT IN AN ORGANIZED HEALTH CARE EDUCATION/TRAINING PROGRAM

## 2023-06-15 RX ORDER — GADOBUTROL 604.72 MG/ML
0.1 INJECTION INTRAVENOUS ONCE
Status: COMPLETED | OUTPATIENT
Start: 2023-06-15 | End: 2023-06-15

## 2023-06-15 RX ADMIN — GADOBUTROL 8 ML: 604.72 INJECTION INTRAVENOUS at 07:36

## 2023-06-16 ENCOUNTER — TELEPHONE (OUTPATIENT)
Dept: MATERNAL FETAL MEDICINE | Facility: CLINIC | Age: 33
End: 2023-06-16

## 2023-06-16 ENCOUNTER — VIRTUAL VISIT (OUTPATIENT)
Dept: GASTROENTEROLOGY | Facility: CLINIC | Age: 33
End: 2023-06-16
Attending: STUDENT IN AN ORGANIZED HEALTH CARE EDUCATION/TRAINING PROGRAM
Payer: COMMERCIAL

## 2023-06-16 DIAGNOSIS — K75.4 AUTOIMMUNE HEPATITIS (H): Primary | ICD-10-CM

## 2023-06-16 DIAGNOSIS — D69.6 THROMBOCYTOPENIA (H): ICD-10-CM

## 2023-06-16 DIAGNOSIS — K76.6 PORTAL HYPERTENSION (H): ICD-10-CM

## 2023-06-16 DIAGNOSIS — K74.60 CIRRHOSIS OF LIVER WITHOUT ASCITES, UNSPECIFIED HEPATIC CIRRHOSIS TYPE (H): ICD-10-CM

## 2023-06-16 PROCEDURE — 99214 OFFICE O/P EST MOD 30 MIN: CPT | Mod: VID | Performed by: STUDENT IN AN ORGANIZED HEALTH CARE EDUCATION/TRAINING PROGRAM

## 2023-06-16 ASSESSMENT — PAIN SCALES - GENERAL: PAINLEVEL: NO PAIN (0)

## 2023-06-16 NOTE — TELEPHONE ENCOUNTER
Pt called to discuss SSA test results. VM left to call [facility] number back at [phone].    Lindsey Aguila RN

## 2023-06-16 NOTE — LETTER
"    6/16/2023         RE: Yaritza Dias  [address] [facility] [address]        Dear Colleague,    Thank you for referring your patient, Yaritza Dias, to the [facility] [place]. Please see a copy of my visit note below.    Virtual Visit Details    Type of service:  Video Visit     Originating Location (pt. 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 Dias is a 32 year old woman with a history of mild LFT elevation during pregnancy, who presents for evaluation of severe auto immune hepatitis.     Initial history:    She presented with jaundice the end of June 2021. Received her 2/2 COVID shots 5/2021. She had mild AST/ALT elevations during pregnancy, but normalized after pregnancy. She went to her PCP and got labs that showed   TBR 12.8 DBR 7.6 INR 1.6. She had a MRI 7/2/2021 that showed pericholecystic fluid. Also showed 4 \"masses\" in the liver - favored regenerative nodules. \"Slightly hyperintense on precontrast T1-weighted imaging, exhibits hypoechoic intensity on postcontrast imaging with enhancement of septations, exhibits diffusion restriction, and is hypointense on T2-weighted imaging. The largest mass is in the medial left hepatic lobe and is 7.7 x 6.5 x 6.2 cm. The right hepatic lobe is small. Much of the liver is in the left upper quadrant.\" Multiple liver cysts. Biliary system normal without dilation. Spleen was mildly enlarged with a few prominent belkis hepatic lymph nodes    Underwent liver bx 7/6 that showing severe hepatitis necrosis, thought to be autoimmune hepatitis, was started on prednisone 40 mg on July 14.  She had repeat " labs done on July 18 showing worsening alkaline phosphatase and INR.  Patient also had hypotension upon her outpatient lab draw, upon second admission her white count was elevated concerning for sepsis, she was treated with antibiotics.  Her INR slightly went up to 2.2, patient was transferred to [facility] for further evaluation.    While admitted to [facility], labs were stable and LFTs slowly improving. Weaned of steroids 12/2021    MRCP 2/2022 showing lobular appearing liver and enlarged spleen - relatively unchanged since her last MRI    Underwent random liver bx 3/2022 showing Regenerative changes consistent with resolved severe hepatitis     Comment  Liver biopsy is optimal for evaluation by usual criteria and shows minimal portal or lobular inflammation. There is focal bile ductular  reaction without any evidence of cholestasis. No portal or lobular necroinflammatory activity is noted. Trichrome stain shows mild  portal fibrosis. Reticulin stain show features of regenerative nodular hyperplasia. PAS/PAS-D stains are negative for any significant  intracytoplasmic accumulations. Iron stain show no significant iron deposition.  Overall the changes seen in this biopsy are that of resolved severe hepatitis with mild portal fibrosis and features of nodular  regenerative hyperplasia. There is no evidence of active liver disease.    EGD 9/2022 without varices    Interval Events  Taking imuran 50 mg daily and ursodiol 300 mg BID  Feeling well  Met with MFM - discussed her pregnancy would be higher risk but is not contraindicated  Did not have a period until June of this year  Thinking they may want to get pregnant again around the end of the year, using condoms currently  US showing concern for nodular liver or liver mass - Liver MRI without any concerning features, show known changes    ROS: 14 point ROS negative except for positives noted in HPI.    PMHx:  Past Medical History:   Diagnosis Date    Autoimmune hepatitis (H)      Gestational diabetes    Autoimmune hepatitis potentially triggered by COVID vaccination    PSHx:   Vaginal delivery  Liver bx    FamHx:  Family History   Problem Relation Age of Onset    Celiac Disease Sister    Father had coronary artery disease, sister has celiac disease, no other autoimmune disorders in family.  Mother in law had liver transplant for ALD    SocHx:  Social History     Socioeconomic History    Marital status:      Spouse name: Not on file    Number of children: Not on file    Years of education: Not on file    Highest education level: Not on file   Occupational History    Not on file   Tobacco Use    Smoking status: Never    Smokeless tobacco: Never   Vaping Use    Vaping status: Never Used   Substance and Sexual Activity    Alcohol use: Not Currently    Drug use: Never    Sexual activity: Yes     Partners: Male     Birth control/protection: Condom   Other Topics Concern    Parent/sibling w/ CABG, MI or angioplasty before 65F 55M? Not Asked   Social History Narrative    Lives with her , Jose R and their daughter Tesha.      Social Determinants of Health     Financial Resource Strain: Not on file   Food Insecurity: Not on file   Transportation Needs: Not on file   Physical Activity: Not on file   Stress: Not on file   Social Connections: Not on file   Intimate Partner Violence: Not on file   Housing Stability: Not on file       Medications:  Current Outpatient Medications   Medication    azaTHIOprine (IMURAN) 50 MG tablet    calcium gluconate 500 MG tablet    cholecalciferol 25 MCG (1000 UT) TABS    ursodiol (ACTIGALL) 250 MG tablet     No current facility-administered medications for this visit.       Allergies:  Allergies   Allergen Reactions    Penicillins GI Disturbance       Objective:  There were no vitals taken for this visit.  Constitutional: pleasant woman in NAD  Eyes: non icteric  Respiratory: Normal respiratory excursion   MSK: normal range of motion of visualized  extremities  Abd: Non distended  Skin: No jaundice  Psychiatric: normal mood and orientation    Labs: reviewed in EHR    AMA negative  ROLANDO + 1:640  F actin 9  IgG normal  Anti LKM negative  SLA negative    Hepatitis A IgG positive, IgM negative  Hepatitis B Sag negative, core negative, ab immune  TPMT normal 30.9 (22-44)    MRCP 2/19/2022    Liver: Architectural distortion of the liver parenchyma, lobulated  with nodular contours, capsular retraction, atrophic right lobe,  elongated left hepatic lobe, scattered liver cysts and nodular areas  of abnormal decreased enhancement in the arterial, portal venous and  delayed phases. Nodular foci demonstrate mild increased precontrast T1  signal, decreased T2 signal and no restricted diffusion in the present  study. Mild prominent intrahepatic biliary tree dilation. Unremarkable  common bile duct.     Gallbladder: No cholelithiasis. No gallbladder wall thickening. No  pericholecystic fluid.     Spleen: Splenule. Enlarged spleen measuring 16 cm in craniocaudad  dimension. No focal lesions.     Kidneys: No focal lesions. No hydronephrosis.     Adrenal glands: No nodules.     Pancreas: Preserved increased precontrast T1 signal. Main pancreatic  duct is not dilated. No focal lesions.     Bowel: No dilated loops of bowel.     Lymph nodes: Prominent belkis hepatis lymph nodes, likely reactive.  Additional prominent retroperitoneal and mesenteric lymph nodes,  stable.     Blood vessels: Patent liver vasculature. Patent abdominal vasculature.  No abdominal aortic aneurysm.     Lung bases: Unremarkable.     Bones and soft tissues: No aggressive bone lesions.     Mesentery and abdominal wall: Unremarkable.     Ascites: No ascites.     IMPRESSION: In this patient with history of altered immune hepatitis  and biopsy-proven hepatic parenchyma necrosis:  1. Lobulated nodular liver parenchyma with capsular retraction and  nodular areas of hypoenhancement, grossly stable from 7/3/2021 with  no  convincing acute MRI findings on the present study.  2. Splenomegaly. No ascites.  3. Additional incidental findings as described above.    RUQ US 7/22/2021    GALLBLADDER: Not evaluated     LIVER: Partially visualized liver lesions, better evaluated on the recent MRI.       IVC: Normal where visualized.     ABDOMINAL DUPLEX: Technically limited due to overlying bowel gas and body habitus. The middle hepatic vein is not visualized. The left hepatic vein is patent with flow in the normal direction. The right hepatic vein is diminutive but patent with flow in the normal direction.. The hepatic artery, IVC, portal veins, and splenic vein are patent with flow in the normal direction.     IMPRESSION:   1.  Technically limited Doppler evaluation. The middle hepatic vein is not visualized. The left hepatic vein is patent with flow in the normal direction. The right hepatic vein is diminutive but patent with flow in the normal direction. The hepatic artery, IVC, portal veins, and splenic vein are patent with flow in the normal direction.     MRI ABD 7/3/21:    IMPRESSION:   1.  Multiple liver masses are indeterminate. Regenerative nodules are favored. Metastatic disease is less likely given the patient's age. Atypical presentation of a primary liver mass is possible. Follow-up MRI could be considered. Biopsy could be considered.   2.  Mild splenomegaly is indeterminate. Portal hypertension could contribute to splenomegaly.   3.  Mild pericholecystic fluid is nonspecific. Acute cholecystitis is not excluded. There is no bile duct dilatation. No biliary filling defects or strictures identified.         MRI ABD 7/22/21:  IMPRESSION:   1.  Edematous gallbladder wall thickening has increased. Trace pericholecystic fluid is unchanged. Findings may indicate acalculus cholecystitis. Consider nuclear medicine hepatobiliary scan.     2.  Stable atypical appearance of the liver consistent with nonspecific heterogeneous parenchymal  disease. The hepatic veins are poorly visualized. The differential diagnosis includes venoocclusive disease.     3.  Trace peripancreatic fluid has mildly increased and suggests acute pancreatitis.      Liver Bx:  LIVER, NEEDLE BIOPSY:  Hepatitis, severe, grade 4 /4 activity:   -Etiologic considerations include vaccine-induced autoimmune hepatitis vs. Other   -No fibrosis or neoplastic/mass lesion identified     Assessment  32 - year-old female with no significant past medical history, who presents for follow up of her severe hepatic necrosis.      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 Has a history of mild AST/ALT elevation in the past, may have been predisposed to AI hepatitis, which can also flare after childbirth.     Independently reviewed labs and imaging.     Initial Bx showing severe hepatitis with necrosis - felt to be autoimmune type hepatitis potentially triggered by COVID vaccination. She had a liver of elevated LFTs that improved post partum, potentially predisposing her to a flare as well.     Her LFTs have been slowly improving with steroids + imuran + ursodiol. INR improving as well. Stopped prednisone 12/2021. LFTs yadira when stopping imuran, had to restart and now is on low dose (50 mg daily) imuran.  She has had mild leukopenia with Imuran.    Started liver transplant evaluation while inpatient, given her improvement with medical therapy, closed transplant evaluation.     Underwent follow-up liver biopsy that showed  resolved hepatitis, but reticulin stain did show findings of nodular regenerative hyperplasia.  Given her history, feel the nodular regenerative hyperplasia was secondary to the initial insult, rather than Imuran use.  Even though she only had periportal fibrosis in the biopsy, essentially feel she has some noncirrhotic portal hypertension, splenomegaly.  She did not have varices on upper endoscopy September 2022.  This may also be explaining  some of her leukopenia and normal cytopenia (in addition to Imuran).  I discussed that I do not feel Imuran is the primary culprit, also feel it is the best medication for her autoimmune liver disease, particularly if helping to become pregnant in the future.  Discussed that she can continue Imuran during pregnancy.  Second line treatment would be CellCept, which is not safe in pregnancy.  Additional options would be tacrolimus or cyclosporine.      Not trying to get pregnant currently, may in the future. Recommend EGD and screening for splenic artery aneurysm prior to getting pregnant.     - Repeat liver enzymes with CBC and differential in 3 months  - Obtain RUQ US every 6 months for liver cancer screening - may show abnormal appearing liver given her history  - Ordered DEXA, she takes calcium and vitamin D  - She sees dermatology annual no history of skin cancer  - Continue Imuran 50 mg daily  - Continue ursodiol 250 mg twice a day.  - Abdominal US with doppler to screen for splenic artery aneurysm, repeat EGD prior to getting pregnant or Fall 2024    RTC 6 months.    Ashlee Borden MD MS  Hepatology/Liver Transplant  [facility]            Again, thank you for allowing me to participate in the care of your patient.        Sincerely,        Ashlee Borden MD

## 2023-06-16 NOTE — NURSING NOTE
Is the patient currently in the state of [place]? YES    Visit mode:VIDEO    If the visit is dropped, the patient can be reconnected by: VIDEO VISIT: Send to e-mail at: [email or web address]    Will anyone else be joining the visit? NO      How would you like to obtain your AVS? MyChart    Are changes needed to the allergy or medication list? NO    Reason for visit: WOOD Cano

## 2023-06-16 NOTE — PROGRESS NOTES
"Virtual Visit Details    Type of service:  Video Visit     Originating Location (pt. 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 Dias is a 32 year old woman with a history of mild LFT elevation during pregnancy, who presents for evaluation of severe auto immune hepatitis.     Initial history:    She presented with jaundice the end of June 2021. Received her 2/2 COVID shots 5/2021. She had mild AST/ALT elevations during pregnancy, but normalized after pregnancy. She went to her PCP and got labs that showed   TBR 12.8 DBR 7.6 INR 1.6. She had a MRI 7/2/2021 that showed pericholecystic fluid. Also showed 4 \"masses\" in the liver - favored regenerative nodules. \"Slightly hyperintense on precontrast T1-weighted imaging, exhibits hypoechoic intensity on postcontrast imaging with enhancement of septations, exhibits diffusion restriction, and is hypointense on T2-weighted imaging. The largest mass is in the medial left hepatic lobe and is 7.7 x 6.5 x 6.2 cm. The right hepatic lobe is small. Much of the liver is in the left upper quadrant.\" Multiple liver cysts. Biliary system normal without dilation. Spleen was mildly enlarged with a few prominent belkis hepatic lymph nodes    Underwent liver bx 7/6 that showing severe hepatitis necrosis, thought to be autoimmune hepatitis, was started on prednisone 40 mg on July 14.  She had repeat labs done on July 18 showing worsening alkaline phosphatase and INR.  Patient also had hypotension upon her outpatient lab draw, upon second admission her white count was elevated concerning for sepsis, she was treated with antibiotics.  Her INR slightly went up to " 2.2, patient was transferred to [facility] for further evaluation.    While admitted to [facility], labs were stable and LFTs slowly improving. Weaned of steroids 12/2021    MRCP 2/2022 showing lobular appearing liver and enlarged spleen - relatively unchanged since her last MRI    Underwent random liver bx 3/2022 showing Regenerative changes consistent with resolved severe hepatitis     Comment  Liver biopsy is optimal for evaluation by usual criteria and shows minimal portal or lobular inflammation. There is focal bile ductular  reaction without any evidence of cholestasis. No portal or lobular necroinflammatory activity is noted. Trichrome stain shows mild  portal fibrosis. Reticulin stain show features of regenerative nodular hyperplasia. PAS/PAS-D stains are negative for any significant  intracytoplasmic accumulations. Iron stain show no significant iron deposition.  Overall the changes seen in this biopsy are that of resolved severe hepatitis with mild portal fibrosis and features of nodular  regenerative hyperplasia. There is no evidence of active liver disease.    EGD 9/2022 without varices    Interval Events  Taking imuran 50 mg daily and ursodiol 300 mg BID  Feeling well  Met with MFM - discussed her pregnancy would be higher risk but is not contraindicated  Did not have a period until June of this year  Thinking they may want to get pregnant again around the end of the year, using condoms currently  US showing concern for nodular liver or liver mass - Liver MRI without any concerning features, show known changes    ROS: 14 point ROS negative except for positives noted in HPI.    PMHx:  Past Medical History:   Diagnosis Date     Autoimmune hepatitis (H)      Gestational diabetes    Autoimmune hepatitis potentially triggered by COVID vaccination    PSHx:   Vaginal delivery  Liver bx    FamHx:  Family History   Problem Relation Age of Onset     Celiac Disease Sister    Father had coronary artery disease, sister has  celiac disease, no other autoimmune disorders in family.  Mother in law had liver transplant for ALD    SocHx:  Social History     Socioeconomic History     Marital status:      Spouse name: Not on file     Number of children: Not on file     Years of education: Not on file     Highest education level: Not on file   Occupational History     Not on file   Tobacco Use     Smoking status: Never     Smokeless tobacco: Never   Vaping Use     Vaping status: Never Used   Substance and Sexual Activity     Alcohol use: Not Currently     Drug use: Never     Sexual activity: Yes     Partners: Male     Birth control/protection: Condom   Other Topics Concern     Parent/sibling w/ CABG, MI or angioplasty before 65F 55M? Not Asked   Social History Narrative    Lives with her , Jose R and their daughter Tesha.      Social Determinants of Health     Financial Resource Strain: Not on file   Food Insecurity: Not on file   Transportation Needs: Not on file   Physical Activity: Not on file   Stress: Not on file   Social Connections: Not on file   Intimate Partner Violence: Not on file   Housing Stability: Not on file       Medications:  Current Outpatient Medications   Medication     azaTHIOprine (IMURAN) 50 MG tablet     calcium gluconate 500 MG tablet     cholecalciferol 25 MCG (1000 UT) TABS     ursodiol (ACTIGALL) 250 MG tablet     No current facility-administered medications for this visit.       Allergies:  Allergies   Allergen Reactions     Penicillins GI Disturbance       Objective:  There were no vitals taken for this visit.  Constitutional: pleasant woman in NAD  Eyes: non icteric  Respiratory: Normal respiratory excursion   MSK: normal range of motion of visualized extremities  Abd: Non distended  Skin: No jaundice  Psychiatric: normal mood and orientation    Labs: reviewed in EHR    AMA negative  ROLANDO + 1:640  F actin 9  IgG normal  Anti LKM negative  SLA negative    Hepatitis A IgG positive, IgM  negative  Hepatitis B Sag negative, core negative, ab immune  TPMT normal 30.9 (22-44)    MRCP 2/19/2022    Liver: Architectural distortion of the liver parenchyma, lobulated  with nodular contours, capsular retraction, atrophic right lobe,  elongated left hepatic lobe, scattered liver cysts and nodular areas  of abnormal decreased enhancement in the arterial, portal venous and  delayed phases. Nodular foci demonstrate mild increased precontrast T1  signal, decreased T2 signal and no restricted diffusion in the present  study. Mild prominent intrahepatic biliary tree dilation. Unremarkable  common bile duct.     Gallbladder: No cholelithiasis. No gallbladder wall thickening. No  pericholecystic fluid.     Spleen: Splenule. Enlarged spleen measuring 16 cm in craniocaudad  dimension. No focal lesions.     Kidneys: No focal lesions. No hydronephrosis.     Adrenal glands: No nodules.     Pancreas: Preserved increased precontrast T1 signal. Main pancreatic  duct is not dilated. No focal lesions.     Bowel: No dilated loops of bowel.     Lymph nodes: Prominent belkis hepatis lymph nodes, likely reactive.  Additional prominent retroperitoneal and mesenteric lymph nodes,  stable.     Blood vessels: Patent liver vasculature. Patent abdominal vasculature.  No abdominal aortic aneurysm.     Lung bases: Unremarkable.     Bones and soft tissues: No aggressive bone lesions.     Mesentery and abdominal wall: Unremarkable.     Ascites: No ascites.     IMPRESSION: In this patient with history of altered immune hepatitis  and biopsy-proven hepatic parenchyma necrosis:  1. Lobulated nodular liver parenchyma with capsular retraction and  nodular areas of hypoenhancement, grossly stable from 7/3/2021 with no  convincing acute MRI findings on the present study.  2. Splenomegaly. No ascites.  3. Additional incidental findings as described above.    RUQ US 7/22/2021    GALLBLADDER: Not evaluated     LIVER: Partially visualized liver  lesions, better evaluated on the recent MRI.       IVC: Normal where visualized.     ABDOMINAL DUPLEX: Technically limited due to overlying bowel gas and body habitus. The middle hepatic vein is not visualized. The left hepatic vein is patent with flow in the normal direction. The right hepatic vein is diminutive but patent with flow in the normal direction.. The hepatic artery, IVC, portal veins, and splenic vein are patent with flow in the normal direction.     IMPRESSION:   1.  Technically limited Doppler evaluation. The middle hepatic vein is not visualized. The left hepatic vein is patent with flow in the normal direction. The right hepatic vein is diminutive but patent with flow in the normal direction. The hepatic artery, IVC, portal veins, and splenic vein are patent with flow in the normal direction.     MRI ABD 7/3/21:    IMPRESSION:   1.  Multiple liver masses are indeterminate. Regenerative nodules are favored. Metastatic disease is less likely given the patient's age. Atypical presentation of a primary liver mass is possible. Follow-up MRI could be considered. Biopsy could be considered.   2.  Mild splenomegaly is indeterminate. Portal hypertension could contribute to splenomegaly.   3.  Mild pericholecystic fluid is nonspecific. Acute cholecystitis is not excluded. There is no bile duct dilatation. No biliary filling defects or strictures identified.         MRI ABD 7/22/21:  IMPRESSION:   1.  Edematous gallbladder wall thickening has increased. Trace pericholecystic fluid is unchanged. Findings may indicate acalculus cholecystitis. Consider nuclear medicine hepatobiliary scan.     2.  Stable atypical appearance of the liver consistent with nonspecific heterogeneous parenchymal disease. The hepatic veins are poorly visualized. The differential diagnosis includes venoocclusive disease.     3.  Trace peripancreatic fluid has mildly increased and suggests acute pancreatitis.      Liver Bx:  LIVER, NEEDLE  BIOPSY:  Hepatitis, severe, grade 4 /4 activity:   -Etiologic considerations include vaccine-induced autoimmune hepatitis vs. Other   -No fibrosis or neoplastic/mass lesion identified     Assessment  32 - year-old female with no significant past medical history, who presents for follow up of her severe hepatic necrosis.      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 Has a history of mild AST/ALT elevation in the past, may have been predisposed to AI hepatitis, which can also flare after childbirth.     Independently reviewed labs and imaging.     Initial Bx showing severe hepatitis with necrosis - felt to be autoimmune type hepatitis potentially triggered by COVID vaccination. She had a liver of elevated LFTs that improved post partum, potentially predisposing her to a flare as well.     Her LFTs have been slowly improving with steroids + imuran + ursodiol. INR improving as well. Stopped prednisone 12/2021. LFTs yadira when stopping imuran, had to restart and now is on low dose (50 mg daily) imuran.  She has had mild leukopenia with Imuran.    Started liver transplant evaluation while inpatient, given her improvement with medical therapy, closed transplant evaluation.     Underwent follow-up liver biopsy that showed  resolved hepatitis, but reticulin stain did show findings of nodular regenerative hyperplasia.  Given her history, feel the nodular regenerative hyperplasia was secondary to the initial insult, rather than Imuran use.  Even though she only had periportal fibrosis in the biopsy, essentially feel she has some noncirrhotic portal hypertension, splenomegaly.  She did not have varices on upper endoscopy September 2022.  This may also be explaining some of her leukopenia and normal cytopenia (in addition to Imuran).  I discussed that I do not feel Imuran is the primary culprit, also feel it is the best medication for her autoimmune liver disease, particularly if helping to  become pregnant in the future.  Discussed that she can continue Imuran during pregnancy.  Second line treatment would be CellCept, which is not safe in pregnancy.  Additional options would be tacrolimus or cyclosporine.      Not trying to get pregnant currently, may in the future. Recommend EGD and screening for splenic artery aneurysm prior to getting pregnant.     - Repeat liver enzymes with CBC and differential in 3 months  - Obtain RUQ US every 6 months for liver cancer screening - may show abnormal appearing liver given her history  - Ordered DEXA, she takes calcium and vitamin D  - She sees dermatology annual no history of skin cancer  - Continue Imuran 50 mg daily  - Continue ursodiol 250 mg twice a day.  - Abdominal US with doppler to screen for splenic artery aneurysm, repeat EGD prior to getting pregnant or Fall 2024    RTC 6 months.    Ashlee Borden MD MS  Hepatology/Liver Transplant  [facility]

## 2023-08-25 ENCOUNTER — HOSPITAL ENCOUNTER (OUTPATIENT)
Dept: BONE DENSITY | Facility: HOSPITAL | Age: 33
Discharge: HOME | End: 2023-08-25
Attending: STUDENT IN AN ORGANIZED HEALTH CARE EDUCATION/TRAINING PROGRAM | Admitting: STUDENT IN AN ORGANIZED HEALTH CARE EDUCATION/TRAINING PROGRAM
Payer: COMMERCIAL

## 2023-08-25 DIAGNOSIS — K76.6 PORTAL HYPERTENSION (H): ICD-10-CM

## 2023-08-25 DIAGNOSIS — D69.6 THROMBOCYTOPENIA (H): ICD-10-CM

## 2023-08-25 DIAGNOSIS — K75.4 AUTOIMMUNE HEPATITIS (H): ICD-10-CM

## 2023-08-25 DIAGNOSIS — K74.60 CIRRHOSIS OF LIVER WITHOUT ASCITES, UNSPECIFIED HEPATIC CIRRHOSIS TYPE (H): ICD-10-CM

## 2023-08-25 PROCEDURE — 77080 DXA BONE DENSITY AXIAL: CPT

## 2023-10-09 ENCOUNTER — LAB (OUTPATIENT)
Dept: LAB | Facility: CLINIC | Age: 33
End: 2023-10-09
Payer: COMMERCIAL

## 2023-10-09 DIAGNOSIS — D69.6 THROMBOCYTOPENIA (H): ICD-10-CM

## 2023-10-09 DIAGNOSIS — K74.60 CIRRHOSIS OF LIVER WITHOUT ASCITES, UNSPECIFIED HEPATIC CIRRHOSIS TYPE (H): ICD-10-CM

## 2023-10-09 DIAGNOSIS — K76.6 PORTAL HYPERTENSION (H): ICD-10-CM

## 2023-10-09 DIAGNOSIS — N91.2 AMENORRHEA: ICD-10-CM

## 2023-10-09 DIAGNOSIS — K75.4 AUTOIMMUNE HEPATITIS (H): ICD-10-CM

## 2023-10-09 LAB
ERYTHROCYTE [DISTWIDTH] IN BLOOD BY AUTOMATED COUNT: 13.6 % (ref 10–15)
HCT VFR BLD AUTO: 38.3 % (ref 35–47)
HGB BLD-MCNC: 13.1 G/DL (ref 11.7–15.7)
INR PPP: 1.15 (ref 0.85–1.15)
MCH RBC QN AUTO: 29.8 PG (ref 26.5–33)
MCHC RBC AUTO-ENTMCNC: 34.2 G/DL (ref 31.5–36.5)
MCV RBC AUTO: 87 FL (ref 78–100)
PLATELET # BLD AUTO: 97 10E3/UL (ref 150–450)
RBC # BLD AUTO: 4.4 10E6/UL (ref 3.8–5.2)
WBC # BLD AUTO: 3.4 10E3/UL (ref 4–11)

## 2023-10-09 PROCEDURE — 82105 ALPHA-FETOPROTEIN SERUM: CPT

## 2023-10-09 PROCEDURE — 83001 ASSAY OF GONADOTROPIN (FSH): CPT

## 2023-10-09 PROCEDURE — 36415 COLL VENOUS BLD VENIPUNCTURE: CPT

## 2023-10-09 PROCEDURE — 85027 COMPLETE CBC AUTOMATED: CPT

## 2023-10-09 PROCEDURE — 83002 ASSAY OF GONADOTROPIN (LH): CPT

## 2023-10-09 PROCEDURE — 85610 PROTHROMBIN TIME: CPT

## 2023-10-09 PROCEDURE — 80053 COMPREHEN METABOLIC PANEL: CPT

## 2023-10-09 PROCEDURE — 84146 ASSAY OF PROLACTIN: CPT

## 2023-10-09 PROCEDURE — 82670 ASSAY OF TOTAL ESTRADIOL: CPT

## 2023-10-09 PROCEDURE — 84144 ASSAY OF PROGESTERONE: CPT

## 2023-10-09 PROCEDURE — 83520 IMMUNOASSAY QUANT NOS NONAB: CPT

## 2023-10-10 LAB
AFP SERPL-MCNC: 2.4 NG/ML
ALBUMIN SERPL BCG-MCNC: 4.1 G/DL (ref 3.5–5.2)
ALP SERPL-CCNC: 79 U/L (ref 35–104)
ALT SERPL W P-5'-P-CCNC: 10 U/L (ref 0–50)
ANION GAP SERPL CALCULATED.3IONS-SCNC: 10 MMOL/L (ref 7–15)
AST SERPL W P-5'-P-CCNC: 20 U/L (ref 0–45)
BILIRUB SERPL-MCNC: 1.1 MG/DL
BUN SERPL-MCNC: 11.6 MG/DL (ref 6–20)
CALCIUM SERPL-MCNC: 9.1 MG/DL (ref 8.6–10)
CHLORIDE SERPL-SCNC: 103 MMOL/L (ref 98–107)
CREAT SERPL-MCNC: 0.67 MG/DL (ref 0.51–0.95)
DEPRECATED HCO3 PLAS-SCNC: 25 MMOL/L (ref 22–29)
EGFRCR SERPLBLD CKD-EPI 2021: >90 ML/MIN/1.73M2
ESTRADIOL SERPL-MCNC: 68 PG/ML
FSH SERPL IRP2-ACNC: 8 MIU/ML
GLUCOSE SERPL-MCNC: 91 MG/DL (ref 70–99)
LH SERPL-ACNC: 13.8 MIU/ML
MIS SERPL-MCNC: 3.6 NG/ML (ref 0.58–8.1)
POTASSIUM SERPL-SCNC: 4.1 MMOL/L (ref 3.4–5.3)
PROGEST SERPL-MCNC: 0.1 NG/ML
PROLACTIN SERPL 3RD IS-MCNC: 12 NG/ML (ref 5–23)
PROT SERPL-MCNC: 6.7 G/DL (ref 6.4–8.3)
SODIUM SERPL-SCNC: 138 MMOL/L (ref 135–145)

## 2023-10-21 ENCOUNTER — HEALTH MAINTENANCE LETTER (OUTPATIENT)
Age: 33
End: 2023-10-21

## 2023-11-17 ENCOUNTER — MYC MEDICAL ADVICE (OUTPATIENT)
Dept: GASTROENTEROLOGY | Facility: CLINIC | Age: 33
End: 2023-11-17
Payer: COMMERCIAL

## 2023-11-17 DIAGNOSIS — K75.4 AUTOIMMUNE HEPATITIS (H): Primary | ICD-10-CM

## 2023-11-24 ENCOUNTER — LAB (OUTPATIENT)
Dept: LAB | Facility: CLINIC | Age: 33
End: 2023-11-24
Payer: COMMERCIAL

## 2023-11-24 DIAGNOSIS — K75.4 AUTOIMMUNE HEPATITIS (H): ICD-10-CM

## 2023-11-24 LAB
ALBUMIN SERPL BCG-MCNC: 4.1 G/DL (ref 3.5–5.2)
ALP SERPL-CCNC: 73 U/L (ref 40–150)
ALT SERPL W P-5'-P-CCNC: 7 U/L (ref 0–50)
AST SERPL W P-5'-P-CCNC: 21 U/L (ref 0–45)
BILIRUB DIRECT SERPL-MCNC: 0.3 MG/DL (ref 0–0.3)
BILIRUB SERPL-MCNC: 1.2 MG/DL
PROT SERPL-MCNC: 6.7 G/DL (ref 6.4–8.3)

## 2023-11-24 PROCEDURE — 36415 COLL VENOUS BLD VENIPUNCTURE: CPT

## 2023-11-24 PROCEDURE — 80076 HEPATIC FUNCTION PANEL: CPT

## 2023-12-04 ENCOUNTER — HOSPITAL ENCOUNTER (OUTPATIENT)
Dept: ULTRASOUND IMAGING | Facility: HOSPITAL | Age: 33
Discharge: HOME | End: 2023-12-04
Attending: STUDENT IN AN ORGANIZED HEALTH CARE EDUCATION/TRAINING PROGRAM | Admitting: STUDENT IN AN ORGANIZED HEALTH CARE EDUCATION/TRAINING PROGRAM
Payer: COMMERCIAL

## 2023-12-04 DIAGNOSIS — K74.60 CIRRHOSIS OF LIVER WITHOUT ASCITES, UNSPECIFIED HEPATIC CIRRHOSIS TYPE (H): ICD-10-CM

## 2023-12-04 DIAGNOSIS — K75.4 AUTOIMMUNE HEPATITIS (H): ICD-10-CM

## 2023-12-04 DIAGNOSIS — K76.6 PORTAL HYPERTENSION (H): ICD-10-CM

## 2023-12-04 DIAGNOSIS — D69.6 THROMBOCYTOPENIA (H): ICD-10-CM

## 2023-12-04 PROCEDURE — 76705 ECHO EXAM OF ABDOMEN: CPT

## 2024-02-22 DIAGNOSIS — K75.4 AUTOIMMUNE HEPATITIS (H): Primary | ICD-10-CM

## 2024-02-23 ENCOUNTER — OFFICE VISIT (OUTPATIENT)
Dept: FAMILY MEDICINE | Facility: CLINIC | Age: 34
End: 2024-02-23
Payer: COMMERCIAL

## 2024-02-23 VITALS
DIASTOLIC BLOOD PRESSURE: 95 MMHG | HEART RATE: 96 BPM | WEIGHT: 194.3 LBS | BODY MASS INDEX: 33.17 KG/M2 | HEIGHT: 64 IN | OXYGEN SATURATION: 99 % | SYSTOLIC BLOOD PRESSURE: 134 MMHG | RESPIRATION RATE: 16 BRPM | TEMPERATURE: 98.1 F

## 2024-02-23 DIAGNOSIS — R10.9 RIGHT LATERAL ABDOMINAL PAIN: Primary | ICD-10-CM

## 2024-02-23 DIAGNOSIS — R31.29 OTHER MICROSCOPIC HEMATURIA: ICD-10-CM

## 2024-02-23 DIAGNOSIS — K75.4 AUTOIMMUNE HEPATITIS (H): ICD-10-CM

## 2024-02-23 LAB
ALBUMIN SERPL BCG-MCNC: 4.2 G/DL (ref 3.5–5.2)
ALBUMIN UR-MCNC: NEGATIVE MG/DL
ALP SERPL-CCNC: 61 U/L (ref 40–150)
ALT SERPL W P-5'-P-CCNC: 9 U/L (ref 0–50)
AMYLASE SERPL-CCNC: 41 U/L (ref 28–100)
ANION GAP SERPL CALCULATED.3IONS-SCNC: 9 MMOL/L (ref 7–15)
APPEARANCE UR: CLEAR
AST SERPL W P-5'-P-CCNC: 17 U/L (ref 0–45)
BACTERIA #/AREA URNS HPF: ABNORMAL /HPF
BILIRUB SERPL-MCNC: 0.9 MG/DL
BILIRUB UR QL STRIP: NEGATIVE
BUN SERPL-MCNC: 10.4 MG/DL (ref 6–20)
CALCIUM SERPL-MCNC: 9.4 MG/DL (ref 8.6–10)
CHLORIDE SERPL-SCNC: 104 MMOL/L (ref 98–107)
COLOR UR AUTO: YELLOW
CREAT SERPL-MCNC: 0.75 MG/DL (ref 0.51–0.95)
CRP SERPL-MCNC: 5.11 MG/L
DEPRECATED HCO3 PLAS-SCNC: 26 MMOL/L (ref 22–29)
EGFRCR SERPLBLD CKD-EPI 2021: >90 ML/MIN/1.73M2
ERYTHROCYTE [DISTWIDTH] IN BLOOD BY AUTOMATED COUNT: 14 % (ref 10–15)
ERYTHROCYTE [SEDIMENTATION RATE] IN BLOOD BY WESTERGREN METHOD: 13 MM/HR (ref 0–20)
GLUCOSE SERPL-MCNC: 90 MG/DL (ref 70–99)
GLUCOSE UR STRIP-MCNC: NEGATIVE MG/DL
HCT VFR BLD AUTO: 38.7 % (ref 35–47)
HGB BLD-MCNC: 13.1 G/DL (ref 11.7–15.7)
HGB UR QL STRIP: ABNORMAL
INR PPP: 1.25 (ref 0.85–1.15)
KETONES UR STRIP-MCNC: NEGATIVE MG/DL
LEUKOCYTE ESTERASE UR QL STRIP: NEGATIVE
LIPASE SERPL-CCNC: 42 U/L (ref 13–60)
MCH RBC QN AUTO: 29 PG (ref 26.5–33)
MCHC RBC AUTO-ENTMCNC: 33.9 G/DL (ref 31.5–36.5)
MCV RBC AUTO: 86 FL (ref 78–100)
NITRATE UR QL: NEGATIVE
PH UR STRIP: 7 [PH] (ref 5–8)
PLATELET # BLD AUTO: 96 10E3/UL (ref 150–450)
POTASSIUM SERPL-SCNC: 4.2 MMOL/L (ref 3.4–5.3)
PROT SERPL-MCNC: 6.9 G/DL (ref 6.4–8.3)
RBC # BLD AUTO: 4.52 10E6/UL (ref 3.8–5.2)
RBC #/AREA URNS AUTO: ABNORMAL /HPF
SODIUM SERPL-SCNC: 139 MMOL/L (ref 135–145)
SP GR UR STRIP: 1.01 (ref 1–1.03)
SQUAMOUS #/AREA URNS AUTO: ABNORMAL /LPF
UROBILINOGEN UR STRIP-ACNC: 0.2 E.U./DL
WBC # BLD AUTO: 4.1 10E3/UL (ref 4–11)
WBC #/AREA URNS AUTO: ABNORMAL /HPF

## 2024-02-23 PROCEDURE — 81001 URINALYSIS AUTO W/SCOPE: CPT

## 2024-02-23 PROCEDURE — 80053 COMPREHEN METABOLIC PANEL: CPT

## 2024-02-23 PROCEDURE — 86140 C-REACTIVE PROTEIN: CPT

## 2024-02-23 PROCEDURE — 82150 ASSAY OF AMYLASE: CPT

## 2024-02-23 PROCEDURE — 85027 COMPLETE CBC AUTOMATED: CPT

## 2024-02-23 PROCEDURE — 36415 COLL VENOUS BLD VENIPUNCTURE: CPT

## 2024-02-23 PROCEDURE — 99203 OFFICE O/P NEW LOW 30 MIN: CPT

## 2024-02-23 PROCEDURE — 85610 PROTHROMBIN TIME: CPT

## 2024-02-23 PROCEDURE — 83690 ASSAY OF LIPASE: CPT

## 2024-02-23 PROCEDURE — 85652 RBC SED RATE AUTOMATED: CPT

## 2024-02-23 NOTE — ASSESSMENT & PLAN NOTE
Patient presents to clinic today with complaints of right sided abdominal pain that lasted the majority of the day yesterday, however resolved last evening. Reassuringly, she has no alarm findings such as systemic symptoms like fever or chills, changes to her bowel habits, unexplained bloating or nausea. She is having no associated urinary symptoms or vaginal symptoms. Differentials considered today include ovarian cyst, nephrolithiasis, acute intraabdominal process such as appendicitis, gastritis or muscular origin. She had labs ordered by her gastroenterologist including CBC and CMP which I am in agreement with. Will also check inflammatory markers. Lipase and amylase also added as well as UA. UPT was negative at home. We discussed imaging - she had an abdominal ultrasound completed with the last occurrence of this pain in November/December and this was unrevealing, so because this is the same constellation of symptoms I don't see a reason to repeat this. We discussed a transvaginal ultrasound would also be reasonable as a next step especially if her lab workup is unrevealing and/or she has recurrence of symptoms. We discussed that if this was an ovarian cyst, it may have ruptured/resolved which is why her pain has ceased. We reviewed reasons to return to care, including any worsening of symptoms or associated symptoms with the pain such as vomiting or fever. Also recommend follow up if these symptoms persist. Patient expressed an understanding of and agreement with this plan. All questions were answered.

## 2024-02-23 NOTE — PROGRESS NOTES
Assessment & Plan   Problem List Items Addressed This Visit       Autoimmune hepatitis (H)    Right lateral abdominal pain - Primary     Patient presents to clinic today with complaints of right sided abdominal pain that lasted the majority of the day yesterday, however resolved last evening. Reassuringly, she has no alarm findings such as systemic symptoms like fever or chills, changes to her bowel habits, unexplained bloating or nausea. She is having no associated urinary symptoms or vaginal symptoms. Differentials considered today include ovarian cyst, nephrolithiasis, acute intraabdominal process such as appendicitis, gastritis or muscular origin. She had labs ordered by her gastroenterologist including CBC and CMP which I am in agreement with. Will also check inflammatory markers. Lipase and amylase also added as well as UA. UPT was negative at home. We discussed imaging - she had an abdominal ultrasound completed with the last occurrence of this pain in November/December and this was unrevealing, so because this is the same constellation of symptoms I don't see a reason to repeat this. We discussed a transvaginal ultrasound would also be reasonable as a next step especially if her lab workup is unrevealing and/or she has recurrence of symptoms. We discussed that if this was an ovarian cyst, it may have ruptured/resolved which is why her pain has ceased. We reviewed reasons to return to care, including any worsening of symptoms or associated symptoms with the pain such as vomiting or fever. Also recommend follow up if these symptoms persist. Patient expressed an understanding of and agreement with this plan. All questions were answered.         Relevant Orders    ESR: Erythrocyte sedimentation rate (Completed)    CRP, inflammation    Lipase    Amylase    UA Macroscopic with reflex to Microscopic and Culture - Clinic Collect (Completed)    UA Microscopic with Reflex to Culture (Completed)    CT Abdomen Pelvis  w/o & w Contrast     Other Visit Diagnoses       Other microscopic hematuria        Relevant Orders    CT Abdomen Pelvis w/o & w Contrast           ADDENDUM: Patient's UA was significant for moderate blood and RBCs on microscopic exam. Further suggestive of nephrolithiasis causing her symptoms yesterday; because the pain has now resolved, it is possible she has already passed a stone if one was present. Will order CT for her to schedule if her symptoms return.    Allie Vigil is a 33 year old, presenting for the following health issues:  Abdominal Pain (R sided, started yesterday and lasted the day. No abd pain today)      2/23/2024    10:05 AM   Additional Questions   Roomed by OKSANA   Accompanied by self     Right sided abdominal pain started yesterday morning, lasted throughout the day and stopped at 6pm.   Severity at that time was a 6/10 and was constant, not ebbing and flowing. It started dull and achey and will occasionally be sharp. Did not radiate,  but felt cramping in her pelvis.  She would find improvement in symptoms by lying on that side. She tried Tylenol (does not take ibuprofen) which was not necessarily helpful although she only did one dose. She did not try heat or ice.   No fevers, chills, abdominal bloating, heartburn, no urinary symptoms such as dysuria or hematuria, no vaginal discharge or itching, no weight changes, postprandial fullness/nausea or vomiting.  Still has appendix and ovaries.   She had an episode of very similar symptoms in November or December last year. Was evaluated in urgent care. Her UA was negative and she also had a routine abdominal ultrasound around that time which was negative. History of autoimmune hepatitis and does follow with gastroenteroloy. She reached out to her GI provider yesterday and had some labs ordered.    History of Present Illness       Reason for visit:  New abdominal pain on my right side.  Symptom onset:  1-3 days ago  Symptoms include:   "Cramping/pain on my right side. I felt similar pain in November/December and went to Urgent Care for possible UTI but tested negative. They recommended to drink fluids and if it didn't resolve make an appointment with a general care doctor.  Symptom intensity:  Moderate  Symptom progression:  Staying the same  Had these symptoms before:  Yes  Has tried/received treatment for these symptoms:  No  What makes it worse:  No  What makes it better:  Tylenol helped the first time I felt symptoms and then the pain went away and didn't return until today. Tylenol was less helpful today, only took one dose several hours after the pain began.    She eats 2-3 servings of fruits and vegetables daily.She consumes 0 sweetened beverage(s) daily.She exercises with enough effort to increase her heart rate 10 to 19 minutes per day.  She exercises with enough effort to increase her heart rate 4 days per week.   She is taking medications regularly.         Objective    BP (!) 134/95 (BP Location: Left arm, Patient Position: Sitting, Cuff Size: Adult Large)   Pulse 96   Temp 98.1  F (36.7  C) (Oral)   Resp 16   Ht 1.626 m (5' 4\")   Wt 88.1 kg (194 lb 4.8 oz)   SpO2 99%   BMI 33.35 kg/m    Body mass index is 33.35 kg/m .    Physical Exam  Vitals and nursing note reviewed.   Constitutional:       General: She is not in acute distress.     Appearance: Normal appearance. She is not ill-appearing.   Cardiovascular:      Rate and Rhythm: Normal rate and regular rhythm.   Pulmonary:      Effort: Pulmonary effort is normal. No respiratory distress.   Abdominal:      General: Abdomen is flat. Bowel sounds are normal. There is no distension.      Palpations: Abdomen is soft. There is no mass.      Tenderness: There is no right CVA tenderness, left CVA tenderness, guarding or rebound. Negative signs include McBurney's sign.      Comments: \"Sensitive\" along RLQ and right lateral aspect of abdomen   Neurological:      Mental Status: She is " alert.              Signed Electronically by: CATRACHITO Turpin CNP

## 2024-03-04 ENCOUNTER — HOSPITAL ENCOUNTER (OUTPATIENT)
Dept: CT IMAGING | Facility: HOSPITAL | Age: 34
Discharge: HOME | End: 2024-03-04
Payer: COMMERCIAL

## 2024-03-04 DIAGNOSIS — R10.9 RIGHT LATERAL ABDOMINAL PAIN: ICD-10-CM

## 2024-03-04 DIAGNOSIS — R31.29 OTHER MICROSCOPIC HEMATURIA: ICD-10-CM

## 2024-03-04 PROCEDURE — 74178 CT ABD&PLV WO CNTR FLWD CNTR: CPT

## 2024-03-04 PROCEDURE — 250N000011 HC RX IP 250 OP 636

## 2024-03-04 RX ORDER — IOPAMIDOL 755 MG/ML
90 INJECTION, SOLUTION INTRAVASCULAR ONCE
Status: COMPLETED | OUTPATIENT
Start: 2024-03-04 | End: 2024-03-04

## 2024-03-04 RX ADMIN — IOPAMIDOL 90 ML: 755 INJECTION, SOLUTION INTRAVENOUS at 14:17

## 2024-03-05 DIAGNOSIS — N20.0 NEPHROLITHIASIS: Primary | ICD-10-CM

## 2024-03-05 DIAGNOSIS — N13.30 HYDRONEPHROSIS, UNSPECIFIED HYDRONEPHROSIS TYPE: ICD-10-CM

## 2024-03-27 ENCOUNTER — VIRTUAL VISIT (OUTPATIENT)
Dept: UROLOGY | Facility: CLINIC | Age: 34
End: 2024-03-27
Payer: COMMERCIAL

## 2024-03-27 DIAGNOSIS — N13.2 HYDRONEPHROSIS WITH URINARY OBSTRUCTION DUE TO URETERAL CALCULUS: Primary | ICD-10-CM

## 2024-03-27 DIAGNOSIS — N20.0 NEPHROLITHIASIS: ICD-10-CM

## 2024-03-27 PROCEDURE — 99204 OFFICE O/P NEW MOD 45 MIN: CPT | Mod: 95 | Performed by: STUDENT IN AN ORGANIZED HEALTH CARE EDUCATION/TRAINING PROGRAM

## 2024-03-27 RX ORDER — TAMSULOSIN HYDROCHLORIDE 0.4 MG/1
0.4 CAPSULE ORAL DAILY
Qty: 21 CAPSULE | Refills: 0 | Status: SHIPPED | OUTPATIENT
Start: 2024-03-27 | End: 2024-04-17

## 2024-03-27 NOTE — NURSING NOTE
Is the patient currently in the state of [place]? YES    Visit mode:VIDEO    If the visit is dropped, the patient can be reconnected by: VIDEO VISIT: Text to cell phone:   Telephone Information:   Mobile [phone]       Will anyone else be joining the visit? NO  (If patient encounters technical issues they should call [phone]    How would you like to obtain your AVS? MyChart    Are changes needed to the allergy or medication list? No, Pt stated no changes to allergies, and Pt stated no med changes    Reason for visit: Consult    Maria Elena CORDOVA

## 2024-03-27 NOTE — PATIENT INSTRUCTIONS
Please schedule for CT next week  Start tamsulosin 0.4 mg once daily  If unable to pass stone in the next week or so, we will consider for ureteroscopy/intervention  Discussed about shockwave lithotripsy versus ureteroscopy for proximal as well as distal ureteral stones  We will update results of the CT once it is available

## 2024-03-27 NOTE — PROGRESS NOTES
Yaritza is a 33 year old who is being evaluated via a billable video visit.        Video-Visit Details    Type of service:  Video Visit  Video start time: 2:37 PM  Video end time: 2:54 PM  Originating Location (pt. Location): Home    Distant Location (provider location):  On-site  Platform used for Video Visit: [facility]  Signed Electronically by: Naldo Neil MD            Chief Complaint:    Kidney/Ureteral Stone           Consult or Referral:     Mr. Yaritza Dias is a 33 year old female seen at the request of Dr. Polo.         History of Present Illness:    Yaritza Dias is a very pleasant 33 year old female who presents with a history of Kidney/Ureteral Stone.      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 and some liver issues and is taking medications for that  She works for [employer] and is familiar with ureteral stone             Past Medical History:     Past Medical History:   Diagnosis Date    Autoimmune hepatitis (H)     Gestational diabetes             Past Surgical History:     Past Surgical History:   Procedure Laterality Date    ESOPHAGOSCOPY, GASTROSCOPY, DUODENOSCOPY (EGD), COMBINED N/A 9/8/2022    Procedure: ESOPHAGOGASTRODUODENOSCOPY (EGD);  Surgeon: Ashlee Borden MD;  Location: [facility] OR    IR LIVER BIOPSY PERCUTANEOUS  3/18/2022            Medications     Current Outpatient Medications   Medication    tamsulosin (FLOMAX) 0.4 MG capsule    azaTHIOprine (IMURAN) 50 MG tablet    calcium gluconate 500 MG tablet    cholecalciferol 25 MCG (1000 UT) TABS     No current facility-administered medications for this visit.            Family History:     Family History    Problem Relation Age of Onset    Celiac Disease Sister             Social History:     Social History     Socioeconomic History    Marital status:      Spouse name: Not on file    Number of children: Not on file    Years of education: Not on file    Highest education level: Not on file   Occupational History    Not on file   Tobacco Use    Smoking status: Never    Smokeless tobacco: Never   Vaping Use    Vaping Use: Never used   Substance and Sexual Activity    Alcohol use: Not Currently    Drug use: Never    Sexual activity: Yes     Partners: Male     Birth control/protection: Condom   Other Topics Concern    Parent/sibling w/ CABG, MI or angioplasty before 65F 55M? Not Asked   Social History Narrative    Lives with her , Jose R and their daughter Tesha.      Social Determinants of Health     Financial Resource Strain: Low Risk  (1/11/2024)    Financial Resource Strain     Within the past 12 months, have you or your family members you live with been unable to get utilities (heat, electricity) when it was really needed?: No   Food Insecurity: Low Risk  (1/11/2024)    Food Insecurity     Within the past 12 months, did you worry that your food would run out before you got money to buy more?: No     Within the past 12 months, did the food you bought just not last and you didn t have money to get more?: No   Transportation Needs: Low Risk  (1/11/2024)    Transportation Needs     Within the past 12 months, has lack of transportation kept you from medical appointments, getting your medicines, non-medical meetings or appointments, work, or from getting things that you need?: No   Physical Activity: Not on file   Stress: Not on file   Social Connections: Not on file   Interpersonal Safety: Not on file   Housing Stability: Low Risk  (1/11/2024)    Housing Stability     Do you have housing? : Yes     Are you worried about losing your housing?: No            Allergies:   Penicillins         Review of Systems:   From intake questionnaire     Skin: negative  Eyes: negative  Ears/Nose/Throat: negative  Respiratory: No shortness of breath, dyspnea on exertion, cough, or hemoptysis  Cardiovascular: No chest pain or palpitations  Gastrointestinal: negative; no nausea/vomiting, constipation or diarrhea  Genitourinary: as per HPI  Musculoskeletal: negative  Neurologic: negative  Psychiatric: negative  Hematologic/Lymphatic/Immunologic: negative  Endocrine: negative         Physical Exam:   This is a virtual visit    Alert, no acute distress, oriented, conversant    Ears/nose/mouth: mouth:normal, good dentition  Respiratory: no respiratory distress, or pursed lip breathing  Cardiovascular:no obvious jugular venous distension present  Skin: no suspicious lesions or rashes on Visible body parts on the Screen  Neuro: Alert, oriented, speech and mentation normal  Psych: affect and mood normal, alert and oriented to person, place and time      Labs and Pathology:    The following labs were reviewed by me and discussed with the patient:  UA: Abnormal:    Latest Reference Range & Units 02/23/24 10:36   Color Urine Colorless, Straw, Light Yellow, Yellow  Yellow   Appearance Urine Clear  Clear   Glucose Urine Negative mg/dL Negative   Bilirubin Urine Negative  Negative   Ketones Urine Negative mg/dL Negative   Specific Gravity Urine 1.005 - 1.030  1.010   pH Urine 5.0 - 8.0  7.0   Protein Albumin Urine Negative mg/dL Negative   Urobilinogen Urine 0.2, 1.0 E.U./dL 0.2   Nitrite Urine Negative  Negative   Blood Urine Negative  Moderate !   Leukocyte Esterase Urine Negative  Negative   WBC Urine 0-5 /HPF /HPF 0-5   RBC Urine 0-2 /HPF /HPF 2-5 !   Bacteria Urine None Seen /HPF Moderate !   Squamous Epithelial /LPF Urine None Seen /LPF Moderate !   !: Data is abnormal  Significant for   Lab Results   Component Value Date    CR 0.75 02/23/2024    CR 0.67 10/09/2023    CR 0.72 06/14/2023    CR 0.84 08/11/2021    CR 0.65 07/26/2021    CR 0.69  "07/25/2021    CR 0.71 07/24/2021    CR 0.66 07/23/2021     No results found for: \"PSA\"          Imaging:    The following imaging exams were independently viewed and interpreted by me and discussed with patient:  CT Scan Abd/Pelvis: Abnormal:   EXAM: CT ABDOMEN PELVIS W/O and W CONTRAST  LOCATION: [facility]  DATE: 3/4/2024     INDICATION: right flank pain, hematuria, eval for stones  COMPARISON: MRI 06/15/2023  TECHNIQUE: CT scan of the abdomen and pelvis was performed before and after injection of IV contrast. Multiplanar reformats were obtained. Dose reduction techniques were used.  CONTRAST: 90ml IV ISOVUE 370      FINDINGS:   LOWER CHEST: Normal.     HEPATOBILIARY: Similar appearance of lobulated liver with nodular contour and areas of marked atrophy. Scattered benign cysts within the liver appears similar to prior MRI.     PANCREAS: No significant mass, duct dilatation, or inflammatory change.     SPLEEN: Splenomegaly.     ADRENAL GLANDS: No significant nodules.     KIDNEYS/BLADDER: Moderate right hydronephrosis and hydroureter secondary to 4 x 6 x 8 mm stone in the right ureter at the L4 level. No renal masses. Segmentally demonstrated renal collecting systems and ureters are otherwise unremarkable.     BOWEL: Nonspecific nonobstructive bowel gas pattern. Appendix not seen     LYMPH NODES: No lymphadenopathy.     VASCULATURE: No abdominal aortic aneurysm. Varices in the left abdomen.     PELVIC ORGANS: No pelvic masses. No pelvic free fluid.     MUSCULOSKELETAL: No concerning osseous abnormalities.                                                                      IMPRESSION:   1.  Moderate right hydronephrosis and hydroureter secondary to 4 x 6 x 8 mm stone in the right ureter at the L4 level.     2.  Cirrhotic liver with abdominal varices. Splenomegaly.             Assessment and Plan:     Assessment:     Nephrolithiasis  Hydronephrosis with urinary obstruction due to ureteral " calculus  Yaritza has a obstructing ureteral stones in the proximal ureter measuring about 8 mm in maximum dimensions  Does not have a history of passage since the first time it was diagnosed  She has not taken any additional medications or pain pills since the time of diagnosis  We discussed about medical expulsive therapy and I recommended her to start tamsulosin for now  Additionally she has a stone that is pretty big pass on its own therefore we should document where it is currently before recommending any interventions.  I have therefore ordered a CT of the abdomen which she can get done next week  If there is still stone present on the CT I would recommend intervention.  We discussed about ureteroscopy as a primary method of intervention for ureteral stones  We also talked about shockwave lithotripsy and how that is preferred for proximal ureteral stones or kidney stones.  We discussed about the merits and demerits of each of these procedures and I discussed with her that if the stone is migrated more distally not recommend shockwave lithotripsy  She expressed understanding of this and was agreeable  We plan to get a urine culture CT abdomen and started on tamsulosin results from there  - Adult Urology  Referral  - CT Abdomen Pelvis w/o Contrast; Future  - tamsulosin (FLOMAX) 0.4 MG capsule; Take 1 capsule (0.4 mg) by mouth daily for 21 days  - Urine Culture Aerobic Bacterial; Future    Plan:  Start tamsulosin, get CT done next week  Plan for possible removal versus expectant    Orders  Orders Placed This Encounter   Procedures    CT Abdomen Pelvis w/o Contrast         Naldo Neil MD  [facility]                  ==========================    Additional Billing and Coding Information:  Review of external notes as documented above   Review of the result(s) of each unique test - UA, creatinine, CT abdomen pelvis                Need to minutes spent by me on the  date of the encounter doing chart review, review of test results, interpretation of tests, patient visit, and documentation     ==========================

## 2024-04-05 ENCOUNTER — HOSPITAL ENCOUNTER (OUTPATIENT)
Dept: CT IMAGING | Facility: HOSPITAL | Age: 34
Discharge: HOME | End: 2024-04-05
Attending: STUDENT IN AN ORGANIZED HEALTH CARE EDUCATION/TRAINING PROGRAM | Admitting: STUDENT IN AN ORGANIZED HEALTH CARE EDUCATION/TRAINING PROGRAM
Payer: COMMERCIAL

## 2024-04-05 ENCOUNTER — LAB (OUTPATIENT)
Dept: LAB | Facility: CLINIC | Age: 34
End: 2024-04-05
Payer: COMMERCIAL

## 2024-04-05 DIAGNOSIS — N20.0 NEPHROLITHIASIS: ICD-10-CM

## 2024-04-05 DIAGNOSIS — N13.2 HYDRONEPHROSIS WITH URINARY OBSTRUCTION DUE TO URETERAL CALCULUS: ICD-10-CM

## 2024-04-05 PROCEDURE — 87086 URINE CULTURE/COLONY COUNT: CPT

## 2024-04-05 PROCEDURE — 74176 CT ABD & PELVIS W/O CONTRAST: CPT

## 2024-04-06 LAB — BACTERIA UR CULT: NORMAL

## 2024-04-09 ENCOUNTER — MYC MEDICAL ADVICE (OUTPATIENT)
Dept: UROLOGY | Facility: CLINIC | Age: 34
End: 2024-04-09
Payer: COMMERCIAL

## 2024-04-09 DIAGNOSIS — N13.2 HYDRONEPHROSIS WITH URINARY OBSTRUCTION DUE TO URETERAL CALCULUS: ICD-10-CM

## 2024-04-09 DIAGNOSIS — N20.1 URETERAL STONE: Primary | ICD-10-CM

## 2024-04-10 RX ORDER — TAMSULOSIN HYDROCHLORIDE 0.4 MG/1
0.4 CAPSULE ORAL DAILY
Qty: 14 CAPSULE | Refills: 0 | Status: SHIPPED | OUTPATIENT
Start: 2024-04-10 | End: 2024-04-24

## 2024-04-10 NOTE — TELEPHONE ENCOUNTER
I called and discussed with Yaritza about the addendum on the radiology report and the concern for a possible nonobstructing ureteral stone  She has not been symptomatic and has not documented stone passage since diagnosis  Based on the current predicament I would be comfortable with continued medical management  Plan would be to consider for repeat CT with urogram next month if she does not document passage.  In the meantime she continues taking tamsulosin  Possible ureteroscopy if persistence of stone documented on CT.  This was discussed with her and she was agreeable to the plan I have sent a prescription for tamsulosin to the end of the month.  Naldo Neil MD

## 2024-04-15 ENCOUNTER — TELEPHONE (OUTPATIENT)
Dept: UROLOGY | Facility: CLINIC | Age: 34
End: 2024-04-15
Payer: COMMERCIAL

## 2024-04-15 DIAGNOSIS — N13.2 HYDRONEPHROSIS WITH URINARY OBSTRUCTION DUE TO URETERAL CALCULUS: Primary | ICD-10-CM

## 2024-04-15 NOTE — TELEPHONE ENCOUNTER
Per provider, patient to do a repeat urine culture.  She will go in on Wednesday to do so, lab appointment made.  Ivette Encinas RN

## 2024-05-03 ENCOUNTER — LAB (OUTPATIENT)
Dept: LAB | Facility: CLINIC | Age: 34
End: 2024-05-03
Payer: COMMERCIAL

## 2024-05-03 DIAGNOSIS — N13.2 HYDRONEPHROSIS WITH URINARY OBSTRUCTION DUE TO URETERAL CALCULUS: ICD-10-CM

## 2024-05-03 PROCEDURE — 87086 URINE CULTURE/COLONY COUNT: CPT

## 2024-05-04 LAB — BACTERIA UR CULT: NORMAL

## 2024-05-08 ENCOUNTER — TELEPHONE (OUTPATIENT)
Dept: UROLOGY | Facility: CLINIC | Age: 34
End: 2024-05-08
Payer: COMMERCIAL

## 2024-05-08 NOTE — TELEPHONE ENCOUNTER
LM for pt to call RN back regarding her recent ureteral stone. If pt has not passed the stone, Dr. Neil would like CT urogram, which has been ordered.     RN direct number provided.     ALICIA London  Care Coordinator- Urology   [phone]    Product 77 Amount/Unit (Numbers Only): 0 Product 15 Unit Type: bottle(s) Product 4 Refills: 3 Product 53 Unit Type: mg Product 13 Amount/Unit (Numbers Only): 1 Product 9 Solo/Unit (In Dollars): 45 Product 17 Application Directions: apply to affected area daily Product 17 Unit Type: ml Name Of Product 16: Dermatitis Topical Solution (Clobetasol) #16 Product 11 Application Directions: apply to affected area bid for 10 days Product 2 Application Directions: apply to face at night Product 9 Refills: 2 Name Of Product 7: #6 Alopecia  Topical Solution Product 5 Price/Unit (In Dollars): 55 Detail Level: Zone Name Of Product 12: Melasma Emulsion Name Of Product 3: Acne Moisturizing Cream #2 Product 7 Application Directions: apply 2-3 drops to scalp 3 times per week Product 5 Refills: 4 Send Charges To Patient Encounter: Yes Name Of Product 10: Fungal Dermatitis Cream (hydro/Keto) Name Of Product 1: Nail Fungal Solution Product 8 Price/Unit (In Dollars): 35 Product 16 Application Directions: mix entire bottle into a large jar of Cerave Cream OTC and apply to affected area bid Name Of Product 6: AK gel #5 Name Of Product 15: Antifungal Shampoo $15 Product 10 Application Directions: apply to affected area 3 times per week Product 1 Application Directions: apply to nails twice a day Name Of Product 17: Xerosis Gel $17 Product 4 Price/Unit (In Dollars): 40 Product 15 Application Directions: apply to scalp 3 x per week Name Of Product 11: Antibacterial Ointment # 11 Name Of Product 2: Acne Gel #1 Product 6 Application Directions: apply to affected area 3x per week Product 13 Application Directions: apply to affected bid Product 11 Price/Unit (In Dollars): 20 Product 17 Amount/Unit (Numbers Only): 120 Name Of Product 9: Dermatitis Cream $9 (Clobetasol) Product 4 Application Directions: apply to face and body at night Name Of Product 5: Acne Gel $4 (adapalene/bpo) Name Of Product 14: Rosacea Silicone Gel #14 Product 9 Application Directions: apply to affected area twice a day for two weeks on and two weeks off Product 3 Price/Unit (In Dollars): 50 Product 14 Application Directions: apply to face daily Product 12 Price/Unit (In Dollars): 60 Product 12 Application Directions: apply to face nightly for two months then two months off Name Of Product 8: Antifungal Cream #8 (Econazole) Product 15 Price/Unit (In Dollars): 30 Name Of Product 4: Acne Gel Combo #3 (BPO/Clinda) Name Of Product 13: Rosacea Cream (azeleic acid) #13 Product 8 Application Directions: apply to affected area twice a day

## 2024-05-09 NOTE — TELEPHONE ENCOUNTER
Spoke with pt. She is unsure if she has passed the stone. She is not able to strain her urine consistently, but she has not visualized the stone.     Advised Dr. Neil recommends CT urogram. Pt will call to schedule.     ALICIA London  Care Coordinator- Urology   [phone]

## 2024-05-17 ENCOUNTER — HOSPITAL ENCOUNTER (OUTPATIENT)
Dept: CT IMAGING | Facility: HOSPITAL | Age: 34
Discharge: HOME | End: 2024-05-17
Attending: STUDENT IN AN ORGANIZED HEALTH CARE EDUCATION/TRAINING PROGRAM | Admitting: STUDENT IN AN ORGANIZED HEALTH CARE EDUCATION/TRAINING PROGRAM
Payer: COMMERCIAL

## 2024-05-17 DIAGNOSIS — N20.1 URETERAL STONE: ICD-10-CM

## 2024-05-17 DIAGNOSIS — N13.2 HYDRONEPHROSIS WITH URINARY OBSTRUCTION DUE TO URETERAL CALCULUS: ICD-10-CM

## 2024-05-17 PROCEDURE — 250N000011 HC RX IP 250 OP 636: Performed by: STUDENT IN AN ORGANIZED HEALTH CARE EDUCATION/TRAINING PROGRAM

## 2024-05-17 PROCEDURE — 74178 CT ABD&PLV WO CNTR FLWD CNTR: CPT

## 2024-05-17 PROCEDURE — 250N000009 HC RX 250: Performed by: STUDENT IN AN ORGANIZED HEALTH CARE EDUCATION/TRAINING PROGRAM

## 2024-05-17 RX ORDER — IOPAMIDOL 755 MG/ML
90 INJECTION, SOLUTION INTRAVASCULAR ONCE
Status: COMPLETED | OUTPATIENT
Start: 2024-05-17 | End: 2024-05-17

## 2024-05-17 RX ADMIN — IOPAMIDOL 90 ML: 755 INJECTION, SOLUTION INTRAVENOUS at 12:59

## 2024-05-17 RX ADMIN — SODIUM CHLORIDE 95 ML: 9 INJECTION, SOLUTION INTRAVENOUS at 12:59

## 2024-05-20 ENCOUNTER — TELEPHONE (OUTPATIENT)
Dept: UROLOGY | Facility: CLINIC | Age: 34
End: 2024-05-20
Payer: COMMERCIAL

## 2024-05-20 ENCOUNTER — PREP FOR PROCEDURE (OUTPATIENT)
Dept: UROLOGY | Facility: CLINIC | Age: 34
End: 2024-05-20
Payer: COMMERCIAL

## 2024-05-20 DIAGNOSIS — N20.1 URETERAL STONE: Primary | ICD-10-CM

## 2024-05-20 RX ORDER — CEFAZOLIN SODIUM 2 G/50ML
2 SOLUTION INTRAVENOUS
Status: CANCELLED | OUTPATIENT
Start: 2024-05-20

## 2024-05-20 RX ORDER — CEFAZOLIN SODIUM 2 G/50ML
2 SOLUTION INTRAVENOUS SEE ADMIN INSTRUCTIONS
Status: CANCELLED | OUTPATIENT
Start: 2024-05-20

## 2024-05-20 NOTE — TELEPHONE ENCOUNTER
Message left for patient to call back to nurse regarding her ct scan results.  Dr Neil to put in surgery orders for stone removal.  [facility] message also sent.  Ivette Encinas RN

## 2024-05-21 ENCOUNTER — TELEPHONE (OUTPATIENT)
Dept: UROLOGY | Facility: CLINIC | Age: 34
End: 2024-05-21
Payer: COMMERCIAL

## 2024-05-24 ENCOUNTER — TELEPHONE (OUTPATIENT)
Dept: UROLOGY | Facility: CLINIC | Age: 34
End: 2024-05-24
Payer: COMMERCIAL

## 2024-05-30 ENCOUNTER — MYC MEDICAL ADVICE (OUTPATIENT)
Dept: UROLOGY | Facility: CLINIC | Age: 34
End: 2024-05-30
Payer: COMMERCIAL

## 2024-05-30 DIAGNOSIS — N20.1 URETERAL STONE: Primary | ICD-10-CM

## 2024-05-31 RX ORDER — CIPROFLOXACIN 500 MG/1
500 TABLET, FILM COATED ORAL 2 TIMES DAILY
Qty: 6 TABLET | Refills: 0 | Status: SHIPPED | OUTPATIENT
Start: 2024-05-31 | End: 2024-06-03

## 2024-06-07 ENCOUNTER — OFFICE VISIT (OUTPATIENT)
Dept: FAMILY MEDICINE | Facility: CLINIC | Age: 34
End: 2024-06-07
Payer: COMMERCIAL

## 2024-06-07 VITALS
DIASTOLIC BLOOD PRESSURE: 95 MMHG | OXYGEN SATURATION: 100 % | BODY MASS INDEX: 32.78 KG/M2 | RESPIRATION RATE: 16 BRPM | WEIGHT: 192 LBS | SYSTOLIC BLOOD PRESSURE: 131 MMHG | HEART RATE: 98 BPM | HEIGHT: 64 IN | TEMPERATURE: 98.2 F

## 2024-06-07 DIAGNOSIS — N20.0 NEPHROLITHIASIS: ICD-10-CM

## 2024-06-07 DIAGNOSIS — D69.6 THROMBOCYTOPENIA (H): ICD-10-CM

## 2024-06-07 DIAGNOSIS — N13.30 HYDRONEPHROSIS, UNSPECIFIED HYDRONEPHROSIS TYPE: ICD-10-CM

## 2024-06-07 DIAGNOSIS — Z01.818 PREOP GENERAL PHYSICAL EXAM: Primary | ICD-10-CM

## 2024-06-07 DIAGNOSIS — K75.4 AUTOIMMUNE HEPATITIS (H): ICD-10-CM

## 2024-06-07 LAB
ERYTHROCYTE [DISTWIDTH] IN BLOOD BY AUTOMATED COUNT: 13.7 % (ref 10–15)
HCG UR QL: NEGATIVE
HCT VFR BLD AUTO: 39.8 % (ref 35–47)
HGB BLD-MCNC: 13.3 G/DL (ref 11.7–15.7)
MCH RBC QN AUTO: 29 PG (ref 26.5–33)
MCHC RBC AUTO-ENTMCNC: 33.4 G/DL (ref 31.5–36.5)
MCV RBC AUTO: 87 FL (ref 78–100)
PLATELET # BLD AUTO: 84 10E3/UL (ref 150–450)
RBC # BLD AUTO: 4.59 10E6/UL (ref 3.8–5.2)
WBC # BLD AUTO: 3.4 10E3/UL (ref 4–11)

## 2024-06-07 PROCEDURE — 81025 URINE PREGNANCY TEST: CPT | Performed by: FAMILY MEDICINE

## 2024-06-07 PROCEDURE — 80053 COMPREHEN METABOLIC PANEL: CPT | Performed by: FAMILY MEDICINE

## 2024-06-07 PROCEDURE — 85027 COMPLETE CBC AUTOMATED: CPT | Performed by: FAMILY MEDICINE

## 2024-06-07 PROCEDURE — 99214 OFFICE O/P EST MOD 30 MIN: CPT | Performed by: FAMILY MEDICINE

## 2024-06-07 PROCEDURE — 36415 COLL VENOUS BLD VENIPUNCTURE: CPT | Performed by: FAMILY MEDICINE

## 2024-06-07 NOTE — PATIENT INSTRUCTIONS

## 2024-06-07 NOTE — PROGRESS NOTES
Preoperative Evaluation  [facility]  [address] [facility] [address]  Phone: [phone]  Fax: [phone]  Primary Provider: Laureano Douglas, DO  Pre-op Performing Provider: Serena Rasheed MD  Jun 7, 2024 6/6/2024   Surgical Information   What procedure is being done? CYSTOURETEROSCOPY, WITH RETROGRADE PYELOGRAM, LASER LITHOTRIPSY OF URETERAL CALCULUS, AND STENT INSERTION    Facility or Hospital where procedure/surgery will be performed: [facility]   Who is doing the procedure / surgery? Dr. Neil   Date of surgery / procedure: 6/11/24   Time of surgery / procedure: 12:30 PM   Where do you plan to recover after surgery? at home with family     Fax number for surgical facility: Note will be available via epic, no need to fax    Assessment & Plan     The proposed surgical procedure is considered INTERMEDIATE risk.    Preop general physical exam  Nephrolithiasis  Hydronephrosis, unspecified hydronephrosis type  Optimized for procedure, no further work up needed UPT negative, cbc normal.   - Comprehensive metabolic panel (BMP + Alb, Alk Phos, ALT, AST, Total. Bili, TP)  - CBC with platelets  - Comprehensive metabolic panel (BMP + Alb, Alk Phos, ALT, AST, Total. Bili, TP)  - CBC with platelets    Autoimmune hepatitis (H)  Seems to be well controlled.  Patient will contact her hepatologist to figure out if she needs to hold the Imuran prior to this procedure.  - Comprehensive metabolic panel (BMP + Alb, Alk Phos, ALT, AST, Total. Bili, TP)  - CBC with platelets  - Comprehensive metabolic panel (BMP + Alb, Alk Phos, ALT, AST, Total. Bili, TP)  - CBC with platelets      Thrombocytopenia (H24)  Platelet is 84. Has been trending in the mid 90's in the last year, most likely due to imuran and liver disease. No signs of bleeding. I think reasonable to go through surgery and monitor for bleeding and pt would need prompt transfusion if  needed.         - No identified additional risk factors other than previously addressed    Preoperative Medication Instructions  Antiplatelet or Anticoagulation Medication Instructions   - Patient is on no antiplatelet or anticoagulation medications.    Additional Medication Instructions  Take all scheduled medications on the day of surgery   - Herbal medications and vitamins: DO NOT TAKE 5-7 days prior to surgery.  - Imuran: she will contact her hepatologist    Recommendation  Approval given to proceed with proposed procedure, without further diagnostic evaluation.        Allie Vigil is a 33 year old, presenting for the following:  Pre-Op Exam          6/7/2024    10:48 AM   Additional Questions   Roomed by Lizabeth GARCIA LPN     HPI related to upcoming procedure:   Chief Complaint   Patient presents with    Pre-Op Exam             6/6/2024   Pre-Op Questionnaire   Have you ever had a heart attack or stroke? No   Have you ever had surgery on your heart or blood vessels, such as a stent placement, a coronary artery bypass, or surgery on an artery in your head, neck, heart, or legs? No   Do you have chest pain with activity? No   Do you have a history of heart failure? No   Do you currently have a cold, bronchitis or symptoms of other infection? No   Do you have a cough, shortness of breath, or wheezing? No   Do you or anyone in your family have previous history of blood clots? No   Do you or does anyone in your family have a serious bleeding problem such as prolonged bleeding following surgeries or cuts? No   Have you ever had problems with anemia or been told to take iron pills? No   Have you had any abnormal blood loss such as black, tarry or bloody stools, or abnormal vaginal bleeding? No   Have you ever had a blood transfusion? No   Are you willing to have a blood transfusion if it is medically needed before, during, or after your surgery? Yes   Have you or any of your relatives ever had problems with anesthesia?  No   Do you have sleep apnea, excessive snoring or daytime drowsiness? No   Do you have any artifical heart valves or other implanted medical devices like a pacemaker, defibrillator, or continuous glucose monitor? No   Do you have artificial joints? No   Are you allergic to latex? No     Health Care Directive  Patient does not have a Health Care Directive or Living Will: Discussed advance care planning with patient; information given to patient to review.    Preoperative Review of    reviewed - no record of controlled substances prescribed.      Status of Chronic Conditions:  See problem list for active medical problems.  Problems all longstanding and stable, except as noted/documented.  See ROS for pertinent symptoms related to these conditions.    Patient Active Problem List    Diagnosis Date Noted    Ureteral stone 05/20/2024     Priority: Medium    Right lateral abdominal pain 02/23/2024     Priority: Medium    Autoimmune hepatitis (H) 07/23/2021     Priority: Medium      Past Medical History:   Diagnosis Date    Autoimmune hepatitis (H)     Gestational diabetes      Past Surgical History:   Procedure Laterality Date    ESOPHAGOSCOPY, GASTROSCOPY, DUODENOSCOPY (EGD), COMBINED N/A 9/8/2022    Procedure: ESOPHAGOGASTRODUODENOSCOPY (EGD);  Surgeon: Ashlee Borden MD;  [place] [facility] [place]    IR LIVER BIOPSY PERCUTANEOUS  3/18/2022     Current Outpatient Medications   Medication Sig Dispense Refill    azaTHIOprine (IMURAN) 50 MG tablet Take 1 tablet (50 mg) by mouth daily 90 tablet 3    calcium gluconate 500 MG tablet Take 1,000 mg by mouth daily       cholecalciferol 25 MCG (1000 UT) TABS Take 1,000 Units by mouth daily          Allergies   Allergen Reactions    Penicillins GI Disturbance        Social History     Tobacco Use    Smoking status: Never    Smokeless tobacco: Never   Substance Use Topics    Alcohol use: Not Currently     Family History   Problem Relation Age of Onset    Celiac Disease Sister      "Anesthesia Reaction No family hx of     Malignant Hyperthermia No family hx of      History   Drug Use Unknown             Review of Systems  Constitutional, HEENT, cardiovascular, pulmonary, gi and gu systems are negative, except as otherwise noted.    Objective    BP (!) 136/91   Pulse 98   Temp 98.2  F (36.8  C) (Oral)   Resp 16   Ht 1.626 m (5' 4\")   Wt 87.1 kg (192 lb)   LMP 05/21/2024   SpO2 100%   BMI 32.96 kg/m     Estimated body mass index is 32.96 kg/m  as calculated from the following:    Height as of this encounter: 1.626 m (5' 4\").    Weight as of this encounter: 87.1 kg (192 lb).  Physical Exam  GENERAL: alert and no distress  NECK: no adenopathy, no asymmetry, masses, or scars  RESP: lungs clear to auscultation - no rales, rhonchi or wheezes  CV: regular rate and rhythm, normal S1 S2, no S3 or S4, no murmur, click or rub, no peripheral edema  ABDOMEN: soft, nontender, no hepatosplenomegaly, no masses  MS: no gross musculoskeletal defects noted, no edema  NEURO: Normal strength and tone, mentation intact and speech normal  PSYCH: mentation appears normal, affect normal/bright  LYMPH: no cervical, supraclavicular, axillary, or inguinal adenopathy    Recent Labs   Lab Test 02/23/24  1036 10/09/23  1430   HGB 13.1 13.1   PLT 96* 97*   INR 1.25* 1.15    138   POTASSIUM 4.2 4.1   CR 0.75 0.67        Diagnostics  Labs pending at this time.  Results will be reviewed when available.  Recent Results (from the past 168 hour(s))   HCG qualitative urine    Collection Time: 06/07/24 11:34 AM   Result Value Ref Range    hCG Urine Qualitative Negative Negative      No EKG required, no history of coronary heart disease, significant arrhythmia, peripheral arterial disease or other structural heart disease.    Revised Cardiac Risk Index (RCRI)  The patient has the following serious cardiovascular risks for perioperative complications:   - No serious cardiac risks = 0 points     RCRI Interpretation: 0 " points: Class I (very low risk - 0.4% complication rate)         Signed Electronically by: Serena Rasheed MD  Copy of this evaluation report is provided to requesting physician.

## 2024-06-08 LAB
ALBUMIN SERPL BCG-MCNC: 4.4 G/DL (ref 3.5–5.2)
ALP SERPL-CCNC: 59 U/L (ref 40–150)
ALT SERPL W P-5'-P-CCNC: 10 U/L (ref 0–50)
ANION GAP SERPL CALCULATED.3IONS-SCNC: 10 MMOL/L (ref 7–15)
AST SERPL W P-5'-P-CCNC: 19 U/L (ref 0–45)
BILIRUB SERPL-MCNC: 1.2 MG/DL
BUN SERPL-MCNC: 8.9 MG/DL (ref 6–20)
CALCIUM SERPL-MCNC: 9.4 MG/DL (ref 8.6–10)
CHLORIDE SERPL-SCNC: 104 MMOL/L (ref 98–107)
CREAT SERPL-MCNC: 0.71 MG/DL (ref 0.51–0.95)
DEPRECATED HCO3 PLAS-SCNC: 24 MMOL/L (ref 22–29)
EGFRCR SERPLBLD CKD-EPI 2021: >90 ML/MIN/1.73M2
GLUCOSE SERPL-MCNC: 84 MG/DL (ref 70–99)
POTASSIUM SERPL-SCNC: 4.5 MMOL/L (ref 3.4–5.3)
PROT SERPL-MCNC: 6.9 G/DL (ref 6.4–8.3)
SODIUM SERPL-SCNC: 138 MMOL/L (ref 135–145)

## 2024-06-10 ENCOUNTER — ANESTHESIA EVENT (OUTPATIENT)
Dept: SURGERY | Facility: AMBULATORY SURGERY CENTER | Age: 34
End: 2024-06-10
Payer: COMMERCIAL

## 2024-06-11 ENCOUNTER — ANESTHESIA (OUTPATIENT)
Dept: SURGERY | Facility: AMBULATORY SURGERY CENTER | Age: 34
End: 2024-06-11
Payer: COMMERCIAL

## 2024-06-11 ENCOUNTER — HOSPITAL ENCOUNTER (OUTPATIENT)
Facility: AMBULATORY SURGERY CENTER | Age: 34
Discharge: HOME | End: 2024-06-11
Attending: STUDENT IN AN ORGANIZED HEALTH CARE EDUCATION/TRAINING PROGRAM
Payer: COMMERCIAL

## 2024-06-11 VITALS
BODY MASS INDEX: 32.27 KG/M2 | HEART RATE: 93 BPM | OXYGEN SATURATION: 97 % | WEIGHT: 189 LBS | TEMPERATURE: 97 F | RESPIRATION RATE: 16 BRPM | SYSTOLIC BLOOD PRESSURE: 115 MMHG | HEIGHT: 64 IN | DIASTOLIC BLOOD PRESSURE: 74 MMHG

## 2024-06-11 DIAGNOSIS — N20.1 URETERAL STONE: Primary | ICD-10-CM

## 2024-06-11 PROCEDURE — 99000 SPECIMEN HANDLING OFFICE-LAB: CPT | Performed by: STUDENT IN AN ORGANIZED HEALTH CARE EDUCATION/TRAINING PROGRAM

## 2024-06-11 PROCEDURE — 82365 CALCULUS SPECTROSCOPY: CPT | Mod: 90 | Performed by: STUDENT IN AN ORGANIZED HEALTH CARE EDUCATION/TRAINING PROGRAM

## 2024-06-11 PROCEDURE — 74420 UROGRAPHY RTRGR +-KUB: CPT | Mod: 26 | Performed by: STUDENT IN AN ORGANIZED HEALTH CARE EDUCATION/TRAINING PROGRAM

## 2024-06-11 PROCEDURE — 52356 CYSTO/URETERO W/LITHOTRIPSY: CPT | Mod: RT | Performed by: STUDENT IN AN ORGANIZED HEALTH CARE EDUCATION/TRAINING PROGRAM

## 2024-06-11 DEVICE — STENT, URETERAL, 4.8FR X 26CM, HYDROPLUS COATING, WITHOUT WIRE, PERCUFLEX PLUS: Type: IMPLANTABLE DEVICE | Status: FUNCTIONAL

## 2024-06-11 RX ORDER — OXYCODONE HYDROCHLORIDE 10 MG/1
10 TABLET ORAL
Status: DISCONTINUED | OUTPATIENT
Start: 2024-06-11 | End: 2024-06-12 | Stop reason: HOSPADM

## 2024-06-11 RX ORDER — FENTANYL CITRATE 50 UG/ML
INJECTION, SOLUTION INTRAMUSCULAR; INTRAVENOUS PRN
Status: DISCONTINUED | OUTPATIENT
Start: 2024-06-11 | End: 2024-06-11

## 2024-06-11 RX ORDER — NALOXONE HYDROCHLORIDE 0.4 MG/ML
0.1 INJECTION, SOLUTION INTRAMUSCULAR; INTRAVENOUS; SUBCUTANEOUS
Status: DISCONTINUED | OUTPATIENT
Start: 2024-06-11 | End: 2024-06-12 | Stop reason: HOSPADM

## 2024-06-11 RX ORDER — PROPOFOL 10 MG/ML
INJECTION, EMULSION INTRAVENOUS PRN
Status: DISCONTINUED | OUTPATIENT
Start: 2024-06-11 | End: 2024-06-11

## 2024-06-11 RX ORDER — SODIUM CHLORIDE, SODIUM LACTATE, POTASSIUM CHLORIDE, CALCIUM CHLORIDE 600; 310; 30; 20 MG/100ML; MG/100ML; MG/100ML; MG/100ML
INJECTION, SOLUTION INTRAVENOUS CONTINUOUS
Status: DISCONTINUED | OUTPATIENT
Start: 2024-06-11 | End: 2024-06-12 | Stop reason: HOSPADM

## 2024-06-11 RX ORDER — OXYBUTYNIN CHLORIDE 5 MG/1
5 TABLET, EXTENDED RELEASE ORAL DAILY
Qty: 7 TABLET | Refills: 0 | Status: SHIPPED | OUTPATIENT
Start: 2024-06-11 | End: 2024-06-18

## 2024-06-11 RX ORDER — ACETAMINOPHEN 325 MG/1
975 TABLET ORAL ONCE
Status: COMPLETED | OUTPATIENT
Start: 2024-06-11 | End: 2024-06-11

## 2024-06-11 RX ORDER — LIDOCAINE 40 MG/G
CREAM TOPICAL
Status: DISCONTINUED | OUTPATIENT
Start: 2024-06-11 | End: 2024-06-12 | Stop reason: HOSPADM

## 2024-06-11 RX ORDER — DEXAMETHASONE SODIUM PHOSPHATE 4 MG/ML
4 INJECTION, SOLUTION INTRA-ARTICULAR; INTRALESIONAL; INTRAMUSCULAR; INTRAVENOUS; SOFT TISSUE
Status: DISCONTINUED | OUTPATIENT
Start: 2024-06-11 | End: 2024-06-12 | Stop reason: HOSPADM

## 2024-06-11 RX ORDER — HYDROMORPHONE HCL IN WATER/PF 6 MG/30 ML
0.2 PATIENT CONTROLLED ANALGESIA SYRINGE INTRAVENOUS EVERY 5 MIN PRN
Status: DISCONTINUED | OUTPATIENT
Start: 2024-06-11 | End: 2024-06-12 | Stop reason: HOSPADM

## 2024-06-11 RX ORDER — ONDANSETRON 2 MG/ML
INJECTION INTRAMUSCULAR; INTRAVENOUS PRN
Status: DISCONTINUED | OUTPATIENT
Start: 2024-06-11 | End: 2024-06-11

## 2024-06-11 RX ORDER — OXYCODONE HYDROCHLORIDE 5 MG/1
5 TABLET ORAL
Status: DISCONTINUED | OUTPATIENT
Start: 2024-06-11 | End: 2024-06-12 | Stop reason: HOSPADM

## 2024-06-11 RX ORDER — FENTANYL CITRATE 0.05 MG/ML
25 INJECTION, SOLUTION INTRAMUSCULAR; INTRAVENOUS
Status: DISCONTINUED | OUTPATIENT
Start: 2024-06-11 | End: 2024-06-12 | Stop reason: HOSPADM

## 2024-06-11 RX ORDER — AMOXICILLIN 250 MG
1-2 CAPSULE ORAL 2 TIMES DAILY
Qty: 30 TABLET | Refills: 0 | Status: SHIPPED | OUTPATIENT
Start: 2024-06-11 | End: 2024-09-19

## 2024-06-11 RX ORDER — FENTANYL CITRATE 0.05 MG/ML
25 INJECTION, SOLUTION INTRAMUSCULAR; INTRAVENOUS EVERY 5 MIN PRN
Status: DISCONTINUED | OUTPATIENT
Start: 2024-06-11 | End: 2024-06-12 | Stop reason: HOSPADM

## 2024-06-11 RX ORDER — ONDANSETRON 4 MG/1
4 TABLET, ORALLY DISINTEGRATING ORAL EVERY 30 MIN PRN
Status: DISCONTINUED | OUTPATIENT
Start: 2024-06-11 | End: 2024-06-12 | Stop reason: HOSPADM

## 2024-06-11 RX ORDER — KETOROLAC TROMETHAMINE 30 MG/ML
INJECTION, SOLUTION INTRAMUSCULAR; INTRAVENOUS PRN
Status: DISCONTINUED | OUTPATIENT
Start: 2024-06-11 | End: 2024-06-11

## 2024-06-11 RX ORDER — CEFAZOLIN SODIUM 2 G/100ML
2 INJECTION, SOLUTION INTRAVENOUS
Status: COMPLETED | OUTPATIENT
Start: 2024-06-11 | End: 2024-06-11

## 2024-06-11 RX ORDER — PROPOFOL 10 MG/ML
INJECTION, EMULSION INTRAVENOUS CONTINUOUS PRN
Status: DISCONTINUED | OUTPATIENT
Start: 2024-06-11 | End: 2024-06-11

## 2024-06-11 RX ORDER — TAMSULOSIN HYDROCHLORIDE 0.4 MG/1
0.4 CAPSULE ORAL DAILY
Qty: 7 CAPSULE | Refills: 0 | Status: SHIPPED | OUTPATIENT
Start: 2024-06-11 | End: 2024-06-18

## 2024-06-11 RX ORDER — ONDANSETRON 4 MG/1
4 TABLET, ORALLY DISINTEGRATING ORAL EVERY 8 HOURS PRN
Qty: 4 TABLET | Refills: 0 | Status: SHIPPED | OUTPATIENT
Start: 2024-06-11 | End: 2024-09-19

## 2024-06-11 RX ORDER — HYDROMORPHONE HCL IN WATER/PF 6 MG/30 ML
0.4 PATIENT CONTROLLED ANALGESIA SYRINGE INTRAVENOUS EVERY 5 MIN PRN
Status: DISCONTINUED | OUTPATIENT
Start: 2024-06-11 | End: 2024-06-12 | Stop reason: HOSPADM

## 2024-06-11 RX ORDER — CEFAZOLIN SODIUM 2 G/100ML
2 INJECTION, SOLUTION INTRAVENOUS SEE ADMIN INSTRUCTIONS
Status: DISCONTINUED | OUTPATIENT
Start: 2024-06-11 | End: 2024-06-12 | Stop reason: HOSPADM

## 2024-06-11 RX ORDER — ONDANSETRON 2 MG/ML
4 INJECTION INTRAMUSCULAR; INTRAVENOUS EVERY 30 MIN PRN
Status: DISCONTINUED | OUTPATIENT
Start: 2024-06-11 | End: 2024-06-12 | Stop reason: HOSPADM

## 2024-06-11 RX ORDER — DEXAMETHASONE SODIUM PHOSPHATE 10 MG/ML
INJECTION, SOLUTION INTRAMUSCULAR; INTRAVENOUS PRN
Status: DISCONTINUED | OUTPATIENT
Start: 2024-06-11 | End: 2024-06-11

## 2024-06-11 RX ORDER — LABETALOL 20 MG/4 ML (5 MG/ML) INTRAVENOUS SYRINGE
5 EVERY 10 MIN PRN
Status: DISCONTINUED | OUTPATIENT
Start: 2024-06-11 | End: 2024-06-12 | Stop reason: HOSPADM

## 2024-06-11 RX ORDER — LIDOCAINE HYDROCHLORIDE 20 MG/ML
INJECTION, SOLUTION INFILTRATION; PERINEURAL PRN
Status: DISCONTINUED | OUTPATIENT
Start: 2024-06-11 | End: 2024-06-11

## 2024-06-11 RX ORDER — FENTANYL CITRATE 0.05 MG/ML
50 INJECTION, SOLUTION INTRAMUSCULAR; INTRAVENOUS EVERY 5 MIN PRN
Status: DISCONTINUED | OUTPATIENT
Start: 2024-06-11 | End: 2024-06-12 | Stop reason: HOSPADM

## 2024-06-11 RX ORDER — ACETAMINOPHEN 325 MG/1
650 TABLET ORAL EVERY 4 HOURS PRN
Qty: 50 TABLET | Refills: 0 | Status: SHIPPED | OUTPATIENT
Start: 2024-06-11 | End: 2024-09-19

## 2024-06-11 RX ORDER — CIPROFLOXACIN 500 MG/1
500 TABLET, FILM COATED ORAL ONCE
Qty: 1 TABLET | Refills: 0 | Status: SHIPPED | OUTPATIENT
Start: 2024-06-11 | End: 2024-06-11

## 2024-06-11 RX ADMIN — PROPOFOL 180 MCG/KG/MIN: 10 INJECTION, EMULSION INTRAVENOUS at 13:24

## 2024-06-11 RX ADMIN — ACETAMINOPHEN 975 MG: 325 TABLET ORAL at 13:04

## 2024-06-11 RX ADMIN — FENTANYL CITRATE 50 MCG: 50 INJECTION, SOLUTION INTRAMUSCULAR; INTRAVENOUS at 13:25

## 2024-06-11 RX ADMIN — PROPOFOL 150 MG: 10 INJECTION, EMULSION INTRAVENOUS at 13:24

## 2024-06-11 RX ADMIN — SODIUM CHLORIDE, SODIUM LACTATE, POTASSIUM CHLORIDE, CALCIUM CHLORIDE: 600; 310; 30; 20 INJECTION, SOLUTION INTRAVENOUS at 13:13

## 2024-06-11 RX ADMIN — CEFAZOLIN SODIUM 2 G: 2 INJECTION, SOLUTION INTRAVENOUS at 13:28

## 2024-06-11 RX ADMIN — DEXAMETHASONE SODIUM PHOSPHATE 10 MG: 10 INJECTION, SOLUTION INTRAMUSCULAR; INTRAVENOUS at 13:28

## 2024-06-11 RX ADMIN — ONDANSETRON 4 MG: 2 INJECTION INTRAMUSCULAR; INTRAVENOUS at 13:28

## 2024-06-11 RX ADMIN — FENTANYL CITRATE 50 MCG: 50 INJECTION, SOLUTION INTRAMUSCULAR; INTRAVENOUS at 13:44

## 2024-06-11 RX ADMIN — LIDOCAINE HYDROCHLORIDE 60 MG: 20 INJECTION, SOLUTION INFILTRATION; PERINEURAL at 13:24

## 2024-06-11 RX ADMIN — FENTANYL CITRATE 50 MCG: 50 INJECTION, SOLUTION INTRAMUSCULAR; INTRAVENOUS at 13:39

## 2024-06-11 RX ADMIN — FENTANYL CITRATE 50 MCG: 50 INJECTION, SOLUTION INTRAMUSCULAR; INTRAVENOUS at 13:33

## 2024-06-11 RX ADMIN — KETOROLAC TROMETHAMINE 15 MG: 30 INJECTION, SOLUTION INTRAMUSCULAR; INTRAVENOUS at 14:03

## 2024-06-11 NOTE — ANESTHESIA PROCEDURE NOTES
Airway       Patient location during procedure: OR  Staff -        Anesthesiologist:  Aimee Castillo MD       CRNA: Mariah Martínez APRN CRNA       Performed By: CRNAIndications and Patient Condition       Indications for airway management: adeline-procedural         Mask difficulty assessment: 1 - vent by mask    Final Airway Details       Final airway type: supraglottic airway    Supraglottic Airway Details        Type: LMA    Post intubation assessment        Placement verified by: capnometry and chest rise        Number of attempts at approach: 1       Secured with: tape       Ease of procedure: easy       Dentition: Intact and Unchanged

## 2024-06-11 NOTE — DISCHARGE INSTRUCTIONS
You have received 975 mg of Acetaminophen (Tylenol) at 1:04PM. Please do not take an additional dose of Tylenol until after 7:04PM.     Do not exceed 4,000 mg of acetaminophen during a 24 hour period and keep in mind that acetaminophen can also be found in many over-the-counter cold medications as well as narcotics that may be given for pain.     You received a dose of IV Toradol at 2:10PM. Please do not take any additional Ibuprofen/NSAID products (Aleve/Advil/Motrin/Naproxen/Celebrex) until after 8:10PM.    [facility]   Adult Discharge Orders & Instructions     For 24 hours after surgery    Get plenty of rest.  A responsible adult must stay with you for at least 24 hours after you leave the hospital.   Do not drive or use heavy equipment.  If you have weakness or tingling, don't drive or use heavy equipment until this feeling goes away.  Do not drink alcohol.  Avoid strenuous or risky activities.  Ask for help when climbing stairs.   You may feel lightheaded.  IF so, sit for a few minutes before standing.  Have someone help you get up.   If you have nausea (feel sick to your stomach): Drink only clear liquids such as apple juice, ginger ale, broth or 7-Up.  Rest may also help.  Be sure to drink enough fluids.  Move to a regular diet as you feel able.  You may have a slight fever. Call the doctor if your fever is over 100 F (37.7 C) (taken under the tongue) or lasts longer than 24 hours.  You may have a dry mouth, a sore throat, muscle aches or trouble sleeping.  These should go away after 24 hours.  Do not make important or legal decisions.   Call your doctor for any of the followin.  Signs of infection (fever, growing tenderness at the surgery site, a large amount of drainage or bleeding, severe pain, foul-smelling drainage, redness, swelling).    2. It has been over 8 to 10 hours since surgery and you are still not able to urinate (pass water).    3.  Headache for over 24 hours.

## 2024-06-11 NOTE — OP NOTE
OPERATIVE NOTE    PREOPERATIVE DIAGNOSIS:  Right-sided distal ureteral stone    POSTOPERATIVE DIAGNOSIS:  Same    PROCEDURES PERFORMED:   1. Cystourethroscopy  2.  Right ureteroscopy  3.  Right retrograde pyelogram with interpretation of intraoperative fluoroscopic imaging  4.  Thulium fiber laser lithotripsy with basket stone extraction  5.  Right ureteral stent placement      STAFF SURGEON:  Dr. Naldo Neil MD, present for the entire case.     ANESTHESIA:  General    ESTIMATED BLOOD LOSS: 1 cc  DRAINS/TUBES:  4.8 Citizen of [place] x 26 cm double-J ureteral stent on a string         IV FLUIDS:   Please see dictated anesthesia record  COMPLICATIONS:  None.   SPECIMEN:   Stones for analysis    SIGNIFICANT FINDINGS: Single stone approximately 6 mm in size in the distal ureter, no hydronephrosis.  Proximal curl of the ureteral stent seen in the renal pelvis under fluoroscopy and distal curl seen in the bladder fluoroscopically and under direct vision.     BRIEF OPERATIVE INDICATIONS:  Yaritza Dias is a(n) 33 year old female with right distal ureteral stone.  After a discussion of all risks, benefits, and alternatives, the patient elected to proceed with definitive stone management. The patient understands the potential need for more than one procedure to eliminate all stone burden.     DESCRIPTION OF PROCEDURE:  After informed consent was obtained, the patient was transported to the operating room & placed supine on the table. Pneumoboots were applied.  After adequate anesthesia was induced, she was placed in lithotomy and prepped and draped in the usual sterile fashion. A timeout was taken to confirm correct patient, procedure and laterality. Pre-operative IV antibiotics were administered.     A 22-Citizen of [place] rigid cystoscope was inserted into a well-lubricated urethra. The anterior urethra was unremarkable,. The left ureteral orifice was identified and  cannulated with a Sensor wire and 5 [place] open-ended catheter. The  wire passed without resistance into the upper pole and the 5-Tajik open ended catheter was removed after a retrograde pyelogram and having had the sensor wire reinserted.  A  The Semirigid ureteroscope was used to identify the stone in the distal ureter. A 365 micron laser fiber was used at a setting of 0.  8 J and 8 hz and lithotripsy was performed. A Halo basket was used to remove all fragments greater than 1 mm. Pullback ureteroscopy was performed and showed no retained stone fragments or ureteral injury. A 4.8 [place] 26-cm double-J stent was advanced over the Sensor wire, and a good proximal curl was seen in the renal pelvis fluoroscopically and the distal curl was seen in the bladder fluoroscopically and under direct vision, and the stent was left on a string which was taped to the pubic area with the plastic adhesive. The bladder was drained.  The patient tolerated the procedure well and there were no apparent complications. The patient  was transported to the postanesthesia care unit in stable condition.     POST-OPERATIVE PLAN: Following recovery in the PACU, the patient can be discharged.  Stent can be removed in 5 days.  Needs to follow-up with me in 3 months      Naldo Neil MD  [facility], Urology

## 2024-06-11 NOTE — ANESTHESIA POSTPROCEDURE EVALUATION
Patient: Yaritza Dias    Procedure: Procedure(s):  CYSTOURETEROSCOPY, WITH RETROGRADE PYELOGRAM, LASER LITHOTRIPSY OF URETERAL CALCULUS,STONE BASKSET RETRIVAL AND STENT INSERTION       Anesthesia Type:  General    Note:  Disposition: Outpatient   Postop Pain Control: Uneventful            Sign Out: Well controlled pain   PONV: No   Neuro/Psych: Uneventful            Sign Out: Acceptable/Baseline neuro status   Airway/Respiratory: Uneventful            Sign Out: Acceptable/Baseline resp. status   CV/Hemodynamics: Uneventful            Sign Out: Acceptable CV status; No obvious hypovolemia; No obvious fluid overload   Other NRE: NONE   DID A NON-ROUTINE EVENT OCCUR? No           Last vitals:  Vitals Value Taken Time   /72 06/11/24 1415   Temp 97.1  F (36.2  C) 06/11/24 1405   Pulse 98 06/11/24 1419   Resp 16 06/11/24 1405   SpO2 97 % 06/11/24 1419   Vitals shown include unfiled device data.    Electronically Signed By: Aimee Castillo MD  June 11, 2024  3:20 PM

## 2024-06-11 NOTE — ANESTHESIA CARE TRANSFER NOTE
Patient: Yaritza Dias    Procedure: Procedure(s):  CYSTOURETEROSCOPY, WITH RETROGRADE PYELOGRAM, LASER LITHOTRIPSY OF URETERAL CALCULUS,STONE BASKSET RETRIVAL AND STENT INSERTION       Diagnosis: Ureteral stone [N20.1]  Diagnosis Additional Information: No value filed.    Anesthesia Type:   General     Note:    Oropharynx: oropharynx clear of all foreign objects and spontaneously breathing  Level of Consciousness: awake  Oxygen Supplementation: face mask  Level of Supplemental Oxygen (L/min / FiO2): 8  Independent Airway: airway patency satisfactory and stable  Dentition: dentition unchanged  Vital Signs Stable: post-procedure vital signs reviewed and stable  Report to RN Given: handoff report given  Patient transferred to: PACU    Handoff Report: Identifed the Patient, Identified the Reponsible Provider, Reviewed the pertinent medical history, Discussed the surgical course, Reviewed Intra-OP anesthesia mangement and issues during anesthesia, Set expectations for post-procedure period and Allowed opportunity for questions and acknowledgement of understanding  Vitals:  Vitals Value Taken Time   /60 06/11/24 1405   Temp 97.1  F (36.2  C) 06/11/24 1405   Pulse 116 06/11/24 1405   Resp 16 06/11/24 1405   SpO2 99 % 06/11/24 1405       Electronically Signed By: CATRACHITO Parker CRNA  June 11, 2024  2:08 PM

## 2024-06-11 NOTE — H&P
Urology H and P Update    I have evaluated Yaritza Dias  today and examined her. She does not note any significant issues since her last evaluation. Based on my evaluation,she is fit to proceed ahead with the planned procedure.    Naldo Neil MD

## 2024-06-11 NOTE — ANESTHESIA PREPROCEDURE EVALUATION
Anesthesia Pre-Procedure Evaluation    Patient: Yaritza Dias   MRN: [id number] : 1990        Procedure : Procedure(s):  CYSTOURETEROSCOPY, WITH RETROGRADE PYELOGRAM, LASER LITHOTRIPSY OF URETERAL CALCULUS, AND STENT INSERTION          Past Medical History:   Diagnosis Date    Autoimmune hepatitis (H)     Gestational diabetes       Past Surgical History:   Procedure Laterality Date    ESOPHAGOSCOPY, GASTROSCOPY, DUODENOSCOPY (EGD), COMBINED N/A 2022    Procedure: ESOPHAGOGASTRODUODENOSCOPY (EGD);  Surgeon: Ashlee Borden MD;  [place] [facility] [place] LIVER BIOPSY PERCUTANEOUS  3/18/2022      Allergies   Allergen Reactions    Penicillins GI Disturbance      Social History     Tobacco Use    Smoking status: Never    Smokeless tobacco: Never   Substance Use Topics    Alcohol use: Not Currently      Wt Readings from Last 1 Encounters:   24 85.7 kg (189 lb)        Anesthesia Evaluation   Pt has had prior anesthetic. Type: MAC.    No history of anesthetic complications       ROS/MED HX  ENT/Pulmonary:  - neg pulmonary ROS     Neurologic:  - neg neurologic ROS     Cardiovascular:  - neg cardiovascular ROS     METS/Exercise Tolerance: >4 METS    Hematologic:  - neg hematologic  ROS     Musculoskeletal:  - neg musculoskeletal ROS     GI/Hepatic:     (+)           hepatitis  liver disease,       Renal/Genitourinary:       Endo:  - neg endo ROS     Psychiatric/Substance Use:     (+) psychiatric history anxiety       Infectious Disease:  - neg infectious disease ROS     Malignancy:       Other:            Physical Exam    Airway        Mallampati: III   TM distance: > 3 FB   Neck ROM: full   Mouth opening: > 3 cm    Respiratory Devices and Support         Dental       (+) Minor Abnormalities - some fillings, tiny chips      Cardiovascular          Rhythm and rate: regular     Pulmonary           breath sounds clear to auscultation           OUTSIDE LABS:  CBC:   Lab Results   Component Value Date    WBC 3.4  (L) 06/07/2024    WBC 4.1 02/23/2024    HGB 13.3 06/07/2024    HGB 13.1 02/23/2024    HCT 39.8 06/07/2024    HCT 38.7 02/23/2024    PLT 84 (L) 06/07/2024    PLT 96 (L) 02/23/2024     BMP:   Lab Results   Component Value Date     06/07/2024     02/23/2024    POTASSIUM 4.5 06/07/2024    POTASSIUM 4.2 02/23/2024    CHLORIDE 104 06/07/2024    CHLORIDE 104 02/23/2024    CO2 24 06/07/2024    CO2 26 02/23/2024    BUN 8.9 06/07/2024    BUN 10.4 02/23/2024    CR 0.71 06/07/2024    CR 0.75 02/23/2024    GLC 84 06/07/2024    GLC 90 02/23/2024     COAGS:   Lab Results   Component Value Date    PTT 36 07/23/2021    INR 1.25 (H) 02/23/2024    FIBR 146 (L) 07/23/2021     POC:   Lab Results   Component Value Date    HCG Negative 06/07/2024     HEPATIC:   Lab Results   Component Value Date    ALBUMIN 4.4 06/07/2024    PROTTOTAL 6.9 06/07/2024    ALT 10 06/07/2024    AST 19 06/07/2024    ALKPHOS 59 06/07/2024    BILITOTAL 1.2 06/07/2024    JILL <10 (L) 07/26/2021     OTHER:   Lab Results   Component Value Date    LACT 1.5 07/25/2021    KENDY 9.4 06/07/2024    PHOS 3.6 07/26/2021    LIPASE 42 02/23/2024    AMYLASE 41 02/23/2024    TSH 0.17 (L) 07/23/2021    T4 1.06 07/23/2021    CRP 5.7 07/25/2021    SED 13 02/23/2024       Anesthesia Plan    ASA Status:  2    NPO Status:  NPO Appropriate    Anesthesia Type: General.     - Airway: LMA   Induction: Intravenous, Propofol.   Maintenance: TIVA.        Consents    Anesthesia Plan(s) and associated risks, benefits, and realistic alternatives discussed. Questions answered and patient/representative(s) expressed understanding.     - Discussed:     - Discussed with:  Patient            Postoperative Care    Pain management: Multi-modal analgesia.   PONV prophylaxis: Ondansetron (or other 5HT-3), Dexamethasone or Solumedrol     Comments:               Aimee Castillo MD               # Thrombocytopenia: Lowest platelets = 84 in last 30 days, will monitor for bleeding  #  "Obesity: Estimated body mass index is 32.44 kg/m  as calculated from the following:    Height as of this encounter: 1.626 m (5' 4\").    Weight as of this encounter: 85.7 kg (189 lb).      "

## 2024-06-12 ENCOUNTER — MYC REFILL (OUTPATIENT)
Dept: GASTROENTEROLOGY | Facility: CLINIC | Age: 34
End: 2024-06-12
Payer: COMMERCIAL

## 2024-06-12 DIAGNOSIS — K75.4 AUTOIMMUNE HEPATITIS (H): ICD-10-CM

## 2024-06-12 RX ORDER — AZATHIOPRINE 50 MG/1
50 TABLET ORAL DAILY
Qty: 90 TABLET | Refills: 0 | Status: SHIPPED | OUTPATIENT
Start: 2024-06-12 | End: 2024-09-11

## 2024-06-13 LAB
APPEARANCE STONE: NORMAL
COMPN STONE: NORMAL
SPECIMEN WT: 46 MG

## 2024-06-17 ENCOUNTER — HOSPITAL ENCOUNTER (EMERGENCY)
Facility: HOSPITAL | Age: 34
Discharge: HOME | End: 2024-06-17
Attending: STUDENT IN AN ORGANIZED HEALTH CARE EDUCATION/TRAINING PROGRAM | Admitting: STUDENT IN AN ORGANIZED HEALTH CARE EDUCATION/TRAINING PROGRAM
Payer: COMMERCIAL

## 2024-06-17 VITALS
HEIGHT: 64 IN | OXYGEN SATURATION: 100 % | SYSTOLIC BLOOD PRESSURE: 144 MMHG | HEART RATE: 104 BPM | BODY MASS INDEX: 32.71 KG/M2 | WEIGHT: 191.6 LBS | TEMPERATURE: 98.2 F | RESPIRATION RATE: 18 BRPM | DIASTOLIC BLOOD PRESSURE: 85 MMHG

## 2024-06-17 DIAGNOSIS — A49.9 UTI (URINARY TRACT INFECTION), BACTERIAL: ICD-10-CM

## 2024-06-17 DIAGNOSIS — N39.0 UTI (URINARY TRACT INFECTION), BACTERIAL: ICD-10-CM

## 2024-06-17 LAB
ALBUMIN SERPL BCG-MCNC: 4 G/DL (ref 3.5–5.2)
ALBUMIN UR-MCNC: NEGATIVE MG/DL
ALP SERPL-CCNC: 58 U/L (ref 40–150)
ALT SERPL W P-5'-P-CCNC: 8 U/L (ref 0–50)
ANION GAP SERPL CALCULATED.3IONS-SCNC: 10 MMOL/L (ref 7–15)
APPEARANCE UR: ABNORMAL
AST SERPL W P-5'-P-CCNC: 15 U/L (ref 0–45)
BACTERIA #/AREA URNS HPF: ABNORMAL /HPF
BASOPHILS # BLD AUTO: 0 10E3/UL (ref 0–0.2)
BASOPHILS NFR BLD AUTO: 0 %
BILIRUB SERPL-MCNC: 0.6 MG/DL
BILIRUB UR QL STRIP: NEGATIVE
BUN SERPL-MCNC: 13.8 MG/DL (ref 6–20)
CALCIUM SERPL-MCNC: 9.3 MG/DL (ref 8.6–10)
CHLORIDE SERPL-SCNC: 106 MMOL/L (ref 98–107)
COLOR UR AUTO: ABNORMAL
CREAT SERPL-MCNC: 0.77 MG/DL (ref 0.51–0.95)
DEPRECATED HCO3 PLAS-SCNC: 24 MMOL/L (ref 22–29)
EGFRCR SERPLBLD CKD-EPI 2021: >90 ML/MIN/1.73M2
EOSINOPHIL # BLD AUTO: 0.1 10E3/UL (ref 0–0.7)
EOSINOPHIL NFR BLD AUTO: 2 %
ERYTHROCYTE [DISTWIDTH] IN BLOOD BY AUTOMATED COUNT: 13.8 % (ref 10–15)
GLUCOSE SERPL-MCNC: 127 MG/DL (ref 70–99)
GLUCOSE UR STRIP-MCNC: NEGATIVE MG/DL
HCG UR QL: NEGATIVE
HCT VFR BLD AUTO: 36.8 % (ref 35–47)
HGB BLD-MCNC: 12.7 G/DL (ref 11.7–15.7)
HGB UR QL STRIP: ABNORMAL
HOLD SPECIMEN: NORMAL
HOLD SPECIMEN: NORMAL
IMM GRANULOCYTES # BLD: 0 10E3/UL
IMM GRANULOCYTES NFR BLD: 1 %
KETONES UR STRIP-MCNC: NEGATIVE MG/DL
LEUKOCYTE ESTERASE UR QL STRIP: ABNORMAL
LYMPHOCYTES # BLD AUTO: 0.8 10E3/UL (ref 0.8–5.3)
LYMPHOCYTES NFR BLD AUTO: 19 %
MAGNESIUM SERPL-MCNC: 1.8 MG/DL (ref 1.7–2.3)
MCH RBC QN AUTO: 28.8 PG (ref 26.5–33)
MCHC RBC AUTO-ENTMCNC: 34.5 G/DL (ref 31.5–36.5)
MCV RBC AUTO: 83 FL (ref 78–100)
MONOCYTES # BLD AUTO: 0.3 10E3/UL (ref 0–1.3)
MONOCYTES NFR BLD AUTO: 8 %
MUCOUS THREADS #/AREA URNS LPF: PRESENT /LPF
NEUTROPHILS # BLD AUTO: 2.9 10E3/UL (ref 1.6–8.3)
NEUTROPHILS NFR BLD AUTO: 70 %
NITRATE UR QL: NEGATIVE
NRBC # BLD AUTO: 0 10E3/UL
NRBC BLD AUTO-RTO: 0 /100
PH UR STRIP: 6.5 [PH] (ref 5–7)
PLATELET # BLD AUTO: 103 10E3/UL (ref 150–450)
POTASSIUM SERPL-SCNC: 4 MMOL/L (ref 3.4–5.3)
PROT SERPL-MCNC: 6.9 G/DL (ref 6.4–8.3)
RBC # BLD AUTO: 4.41 10E6/UL (ref 3.8–5.2)
RBC URINE: 7 /HPF
SODIUM SERPL-SCNC: 140 MMOL/L (ref 135–145)
SP GR UR STRIP: 1.01 (ref 1–1.03)
SQUAMOUS EPITHELIAL: 11 /HPF
UROBILINOGEN UR STRIP-MCNC: <2 MG/DL
WBC # BLD AUTO: 4.1 10E3/UL (ref 4–11)
WBC URINE: 31 /HPF

## 2024-06-17 PROCEDURE — 96361 HYDRATE IV INFUSION ADD-ON: CPT

## 2024-06-17 PROCEDURE — 87086 URINE CULTURE/COLONY COUNT: CPT | Performed by: STUDENT IN AN ORGANIZED HEALTH CARE EDUCATION/TRAINING PROGRAM

## 2024-06-17 PROCEDURE — 96365 THER/PROPH/DIAG IV INF INIT: CPT

## 2024-06-17 PROCEDURE — 258N000003 HC RX IP 258 OP 636: Mod: JZ | Performed by: STUDENT IN AN ORGANIZED HEALTH CARE EDUCATION/TRAINING PROGRAM

## 2024-06-17 PROCEDURE — 36415 COLL VENOUS BLD VENIPUNCTURE: CPT | Performed by: STUDENT IN AN ORGANIZED HEALTH CARE EDUCATION/TRAINING PROGRAM

## 2024-06-17 PROCEDURE — 85025 COMPLETE CBC W/AUTO DIFF WBC: CPT | Performed by: STUDENT IN AN ORGANIZED HEALTH CARE EDUCATION/TRAINING PROGRAM

## 2024-06-17 PROCEDURE — 81025 URINE PREGNANCY TEST: CPT | Performed by: STUDENT IN AN ORGANIZED HEALTH CARE EDUCATION/TRAINING PROGRAM

## 2024-06-17 PROCEDURE — 81001 URINALYSIS AUTO W/SCOPE: CPT | Performed by: STUDENT IN AN ORGANIZED HEALTH CARE EDUCATION/TRAINING PROGRAM

## 2024-06-17 PROCEDURE — 99284 EMERGENCY DEPT VISIT MOD MDM: CPT | Mod: 25

## 2024-06-17 PROCEDURE — 250N000011 HC RX IP 250 OP 636: Mod: JZ | Performed by: STUDENT IN AN ORGANIZED HEALTH CARE EDUCATION/TRAINING PROGRAM

## 2024-06-17 PROCEDURE — 83735 ASSAY OF MAGNESIUM: CPT | Performed by: STUDENT IN AN ORGANIZED HEALTH CARE EDUCATION/TRAINING PROGRAM

## 2024-06-17 PROCEDURE — 80053 COMPREHEN METABOLIC PANEL: CPT | Performed by: STUDENT IN AN ORGANIZED HEALTH CARE EDUCATION/TRAINING PROGRAM

## 2024-06-17 RX ORDER — CEFTRIAXONE 1 G/1
1 INJECTION, POWDER, FOR SOLUTION INTRAMUSCULAR; INTRAVENOUS ONCE
Status: COMPLETED | OUTPATIENT
Start: 2024-06-17 | End: 2024-06-17

## 2024-06-17 RX ORDER — SULFAMETHOXAZOLE/TRIMETHOPRIM 800-160 MG
1 TABLET ORAL 2 TIMES DAILY
Qty: 20 TABLET | Refills: 0 | Status: SHIPPED | OUTPATIENT
Start: 2024-06-17 | End: 2024-06-27

## 2024-06-17 RX ADMIN — CEFTRIAXONE SODIUM 1 G: 1 INJECTION, POWDER, FOR SOLUTION INTRAMUSCULAR; INTRAVENOUS at 21:42

## 2024-06-17 RX ADMIN — SODIUM CHLORIDE 1000 ML: 9 INJECTION, SOLUTION INTRAVENOUS at 21:44

## 2024-06-17 ASSESSMENT — COLUMBIA-SUICIDE SEVERITY RATING SCALE - C-SSRS
2. HAVE YOU ACTUALLY HAD ANY THOUGHTS OF KILLING YOURSELF IN THE PAST MONTH?: NO
1. IN THE PAST MONTH, HAVE YOU WISHED YOU WERE DEAD OR WISHED YOU COULD GO TO SLEEP AND NOT WAKE UP?: NO
6. HAVE YOU EVER DONE ANYTHING, STARTED TO DO ANYTHING, OR PREPARED TO DO ANYTHING TO END YOUR LIFE?: NO

## 2024-06-17 ASSESSMENT — ACTIVITIES OF DAILY LIVING (ADL)
ADLS_ACUITY_SCORE: 33
ADLS_ACUITY_SCORE: 35

## 2024-06-17 NOTE — Clinical Note
Yaritza Dias was seen and treated in our emergency department on 6/17/2024.  She may return to work on 06/19/2024.       If you have any questions or concerns, please don't hesitate to call.      Stephon Hermosillo MD

## 2024-06-18 NOTE — DISCHARGE INSTRUCTIONS
Please return to the emergency department if symptoms worsen, if you develop fever 100.4 or greater while on antibiotics.

## 2024-06-18 NOTE — ED PROVIDER NOTES
EMERGENCY DEPARTMENT ENCOUNTER      NAME: Yaritza Dias  AGE: 33 year old female  YOB: 1990  MRN: [id number]  EVALUATION DATE & TIME: 6/17/2024  8:46 PM    PCP: Laureano Douglas    ED PROVIDER: Stephon Hermosillo MD      Chief Complaint   Patient presents with    Fever         FINAL IMPRESSION:  1. UTI (urinary tract infection), bacterial          ED COURSE & MEDICAL DECISION MAKING:    Pertinent Labs & Imaging studies reviewed. (See chart for details)  33 year old female presents to the Emergency Department for evaluation of fever    ED Course as of 06/17/24 2130 Mon Jun 17, 2024 2125 Patient is a 33-year-old female who was recently treated for a kidney stone and had a stent placed which was removed on Saturday, 2 days ago.  Since then she has developed lower abdominal cramping, and today had a temperature of 100.1.  She is on immunosuppression therapy.  Exam is reassuring with the exception of mild sinus tachycardia.  Pregnancy test negative.  No leukocytosis or anemia.  No electrolyte abnormalities or kidney injury.  No transaminitis.  Urinalysis is turbid with microscopic hematuria, bacteria present with leukocyte esterase, but no nitrites.  Although there are no nitrites, which make it less specific, given that she is immunosuppressed and recently had an indwelling stent, she is at higher risk, so we will treat with ceftriaxone in the emergency department, and then a prescription for Bactrim for 10 days.  Return precautions provided.       Medical Decision Making  Obtained supplemental history:Supplemental history obtained?: No  Reviewed external records: External records reviewed?: No  Care impacted by chronic illness:N/A  Care significantly affected by social determinants of health:N/A  Did you consider but not order tests?: Work up considered but not performed and documented in chart, if applicable  Did you interpret images independently?: Independent interpretation of ECG and images  noted in documentation, when applicable.  Consultation discussion with other provider:Did you involve another provider (consultant, , pharmacy, etc.)?: No  Discharge. I prescribed additional prescription strength medication(s) as charted. See documentation for any additional details.        At the conclusion of the encounter I discussed the results of all of the tests and the disposition. The questions were answered. The patient or family acknowledged understanding and was agreeable with the care plan.     0 minutes of critical care time     MEDICATIONS GIVEN IN THE EMERGENCY:  Medications   cefTRIAXone (ROCEPHIN) 1 g vial to attach to  mL bag for ADULTS or NS 50 mL bag for PEDS (has no administration in time range)   sodium chloride 0.9% BOLUS 1,000 mL (has no administration in time range)       NEW PRESCRIPTIONS STARTED AT TODAY'S ER VISIT  New Prescriptions    SULFAMETHOXAZOLE-TRIMETHOPRIM (BACTRIM DS) 800-160 MG TABLET    Take 1 tablet by mouth 2 times daily for 10 days          =================================================================    HPI    Patient information was obtained from: The patient and family    Use of : N/A         Yaritza Dias is a 33 year old female with a pertinent history of autoimmune hepatitis who presents to this ED by private vehicle for evaluation of fever.    The patient had a recent kidney stone with lithotripsy. Her stent was removed on Saturday (~2 days ago). She notes that she started to have some lower abdominal cramping that afternoon that subsided by the evening. She has not had any pain since. She developed a fever of 101 today. She was on ciprofloxacin last week. She endorses some mild diarrhea yesterday that has since resolved. She denies any current pain. She has no dysuria or hematuria. She is on an immune suppressant and was concerned for possible infection.     PAST MEDICAL HISTORY:  Past Medical History:   Diagnosis Date    Autoimmune  hepatitis (H)     Gestational diabetes     Motion sickness        PAST SURGICAL HISTORY:  Past Surgical History:   Procedure Laterality Date    COMBINED CYSTOSCOPY, RETROGRADES, URETEROSCOPY, LASER HOLMIUM LITHOTRIPSY URETER(S), INSERT STENT Right 6/11/2024    Procedure: CYSTOURETEROSCOPY, WITH RETROGRADE PYELOGRAM, LASER LITHOTRIPSY OF URETERAL CALCULUS,STONE BASKSET RETRIVAL AND STENT INSERTION;  Surgeon: Naldo Neil MD;  Location: [facility] OR    ESOPHAGOSCOPY, GASTROSCOPY, DUODENOSCOPY (EGD), COMBINED N/A 9/8/2022    Procedure: ESOPHAGOGASTRODUODENOSCOPY (EGD);  Surgeon: Ashlee Borden MD;  [place] [facility] [place]    IR LIVER BIOPSY PERCUTANEOUS  3/18/2022           CURRENT MEDICATIONS:    sulfamethoxazole-trimethoprim (BACTRIM DS) 800-160 MG tablet  acetaminophen (TYLENOL) 325 MG tablet  azaTHIOprine (IMURAN) 50 MG tablet  calcium gluconate 500 MG tablet  cholecalciferol 25 MCG (1000 UT) TABS  ondansetron (ZOFRAN ODT) 4 MG ODT tab  oxyBUTYnin ER (DITROPAN XL) 5 MG 24 hr tablet  senna-docusate (SENOKOT-S/PERICOLACE) 8.6-50 MG tablet  tamsulosin (FLOMAX) 0.4 MG capsule        ALLERGIES:  Allergies   Allergen Reactions    Penicillins GI Disturbance       FAMILY HISTORY:  Family History   Problem Relation Age of Onset    Celiac Disease Sister     Anesthesia Reaction No family hx of     Malignant Hyperthermia No family hx of        SOCIAL HISTORY:   Social History     Socioeconomic History    Marital status:    Tobacco Use    Smoking status: Never    Smokeless tobacco: Never   Vaping Use    Vaping status: Never Used   Substance and Sexual Activity    Alcohol use: Not Currently    Drug use: Never    Sexual activity: Yes     Partners: Male     Birth control/protection: Condom   Social History Narrative    Lives with her , Jose R and their daughter Tesha.      Social Determinants of Health     Financial Resource Strain: Low Risk  (1/11/2024)    Financial Resource Strain     Within the past 12  "months, have you or your family members you live with been unable to get utilities (heat, electricity) when it was really needed?: No   Food Insecurity: Low Risk  (1/11/2024)    Food Insecurity     Within the past 12 months, did you worry that your food would run out before you got money to buy more?: No     Within the past 12 months, did the food you bought just not last and you didn t have money to get more?: No   Transportation Needs: Low Risk  (1/11/2024)    Transportation Needs     Within the past 12 months, has lack of transportation kept you from medical appointments, getting your medicines, non-medical meetings or appointments, work, or from getting things that you need?: No   Interpersonal Safety: Low Risk  (6/7/2024)    Interpersonal Safety     Do you feel physically and emotionally safe where you currently live?: Yes     Within the past 12 months, have you been hit, slapped, kicked or otherwise physically hurt by someone?: No     Within the past 12 months, have you been humiliated or emotionally abused in other ways by your partner or ex-partner?: No   Housing Stability: Low Risk  (1/11/2024)    Housing Stability     Do you have housing? : Yes     Are you worried about losing your housing?: No       VITALS:  BP (!) 160/101   Pulse 114   Temp 99.4  F (37.4  C)   Resp 20   Ht 1.626 m (5' 4\")   Wt 86.9 kg (191 lb 9.6 oz)   LMP 05/21/2024   SpO2 98%   BMI 32.89 kg/m      PHYSICAL EXAM    Physical Exam  Vitals and nursing note reviewed.   Constitutional:       General: She is not in acute distress.     Appearance: Normal appearance. She is normal weight. She is not ill-appearing.   HENT:      Head: Normocephalic and atraumatic.      Nose: Nose normal.      Mouth/Throat:      Mouth: Mucous membranes are moist.      Pharynx: Oropharynx is clear.   Eyes:      Extraocular Movements: Extraocular movements intact.      Conjunctiva/sclera: Conjunctivae normal.      Pupils: Pupils are equal, round, and " reactive to light.   Cardiovascular:      Rate and Rhythm: Regular rhythm. Tachycardia present.      Pulses: Normal pulses.      Heart sounds: Normal heart sounds. No murmur heard.  Pulmonary:      Effort: Pulmonary effort is normal. No respiratory distress.      Breath sounds: Normal breath sounds.   Abdominal:      General: Abdomen is flat. There is no distension.      Palpations: Abdomen is soft.      Tenderness: There is no abdominal tenderness. There is no right CVA tenderness or left CVA tenderness.   Musculoskeletal:         General: Normal range of motion.      Cervical back: Normal range of motion.      Right lower leg: No edema.      Left lower leg: No edema.   Skin:     General: Skin is warm and dry.      Capillary Refill: Capillary refill takes less than 2 seconds.   Neurological:      General: No focal deficit present.      Mental Status: She is alert and oriented to person, place, and time. Mental status is at baseline.   Psychiatric:         Mood and Affect: Mood normal.         Behavior: Behavior normal.         Thought Content: Thought content normal.         Judgment: Judgment normal.            LAB:  All pertinent labs reviewed and interpreted.  Results for orders placed or performed during the hospital encounter of 06/17/24   UA with Microscopic reflex to Culture    Specimen: Urine, Midstream   Result Value Ref Range    Color Urine Light Yellow Colorless, Straw, Light Yellow, Yellow    Appearance Urine Turbid (A) Clear    Glucose Urine Negative Negative mg/dL    Bilirubin Urine Negative Negative    Ketones Urine Negative Negative mg/dL    Specific Gravity Urine 1.010 1.001 - 1.030    Blood Urine 0.2 mg/dL (A) Negative    pH Urine 6.5 5.0 - 7.0    Protein Albumin Urine Negative Negative mg/dL    Urobilinogen Urine <2.0 <2.0 mg/dL    Nitrite Urine Negative Negative    Leukocyte Esterase Urine 500 Grzegorz/uL (A) Negative    Bacteria Urine Few (A) None Seen /HPF    Mucus Urine Present (A) None Seen /LPF     RBC Urine 7 (H) <=2 /HPF    WBC Urine 31 (H) <=5 /HPF    Squamous Epithelials Urine 11 (H) <=1 /HPF   Comprehensive metabolic panel   Result Value Ref Range    Sodium 140 135 - 145 mmol/L    Potassium 4.0 3.4 - 5.3 mmol/L    Carbon Dioxide (CO2) 24 22 - 29 mmol/L    Anion Gap 10 7 - 15 mmol/L    Urea Nitrogen 13.8 6.0 - 20.0 mg/dL    Creatinine 0.77 0.51 - 0.95 mg/dL    GFR Estimate >90 >60 mL/min/1.73m2    Calcium 9.3 8.6 - 10.0 mg/dL    Chloride 106 98 - 107 mmol/L    Glucose 127 (H) 70 - 99 mg/dL    Alkaline Phosphatase 58 40 - 150 U/L    AST 15 0 - 45 U/L    ALT 8 0 - 50 U/L    Protein Total 6.9 6.4 - 8.3 g/dL    Albumin 4.0 3.5 - 5.2 g/dL    Bilirubin Total 0.6 <=1.2 mg/dL   Result Value Ref Range    Magnesium 1.8 1.7 - 2.3 mg/dL   HCG qualitative urine   Result Value Ref Range    hCG Urine Qualitative Negative Negative   CBC with platelets and differential   Result Value Ref Range    WBC Count 4.1 4.0 - 11.0 10e3/uL    RBC Count 4.41 3.80 - 5.20 10e6/uL    Hemoglobin 12.7 11.7 - 15.7 g/dL    Hematocrit 36.8 35.0 - 47.0 %    MCV 83 78 - 100 fL    MCH 28.8 26.5 - 33.0 pg    MCHC 34.5 31.5 - 36.5 g/dL    RDW 13.8 10.0 - 15.0 %    Platelet Count 103 (L) 150 - 450 10e3/uL    % Neutrophils 70 %    % Lymphocytes 19 %    % Monocytes 8 %    % Eosinophils 2 %    % Basophils 0 %    % Immature Granulocytes 1 %    NRBCs per 100 WBC 0 <1 /100    Absolute Neutrophils 2.9 1.6 - 8.3 10e3/uL    Absolute Lymphocytes 0.8 0.8 - 5.3 10e3/uL    Absolute Monocytes 0.3 0.0 - 1.3 10e3/uL    Absolute Eosinophils 0.1 0.0 - 0.7 10e3/uL    Absolute Basophils 0.0 0.0 - 0.2 10e3/uL    Absolute Immature Granulocytes 0.0 <=0.4 10e3/uL    Absolute NRBCs 0.0 10e3/uL       RADIOLOGY:  Reviewed all pertinent imaging. Please see official radiology report.  No orders to display       PROCEDURES:   None      [employer] Documentation:   CMS Diagnoses:               Radha DAVENPORT, am serving as a scribe to document services  personally performed by Stephon Hermosillo MD based on my observation and the provider's statements to me. I, Stephon Hermosillo MD, attest that Radha Radhamarthajames is acting in a scribe capacity, has observed my performance of the services and has documented them in accordance with my direction.    Stephon Hermosillo MD  [facility] EMERGENCY DEPARTMENT  [address] [place] [address] [id number] [phone]       Stephon Hermosillo MD  06/17/24 [id number]

## 2024-06-18 NOTE — ED TRIAGE NOTES
Patient had a recent kidney stone with lithotripsy and stent was removed on Sat. [place] ok, and today spike a temp of 100.1 no tylenol taken. Patient has a hx of autoimmune hepatis and is on immunosuppression      Triage Assessment (Adult)       Row Name 06/17/24 2010          Triage Assessment    Airway WDL WDL        Skin Circulation/Temperature WDL    Skin Circulation/Temperature WDL WDL        Cardiac WDL    Cardiac WDL WDL        Peripheral/Neurovascular WDL    Peripheral Neurovascular WDL WDL        Cognitive/Neuro/Behavioral WDL    Cognitive/Neuro/Behavioral WDL WDL

## 2024-06-19 LAB — BACTERIA UR CULT: NORMAL

## 2024-09-03 ENCOUNTER — TELEPHONE (OUTPATIENT)
Dept: OBGYN | Facility: CLINIC | Age: 34
End: 2024-09-03
Payer: COMMERCIAL

## 2024-09-03 DIAGNOSIS — Z32.01 PREGNANCY TEST POSITIVE: Primary | ICD-10-CM

## 2024-09-03 NOTE — TELEPHONE ENCOUNTER
Health Call Center    Phone Message    May a detailed message be left on voicemail: yes     Reason for Call: Other: Pt is scheduled for her initial ob visits. Pt is currently scheduled for her first OB to be around her 11wk+ marly. Pt just has a few concerns and was warned that with her dx of her auto immune disorder that pregnancies moving forward are usually going to be high risk. With this, pt is wondering if any additional labs or sooner visit would be appropriate. Please contact pt to discuss best next steps.      Action Taken: Other: [facility]    Travel Screening: Not Applicable     Date of Service:

## 2024-09-03 NOTE — TELEPHONE ENCOUNTER
"Last seen by [facility] on 3/8/23  \"Yaritza Dias is a 32 year old  female here for amenorrhea likely in the setting of autoimmune hepatitis. \"    I look through the encounter but did not see any recommendations for early appointments or labs due to AIH.    Do you recommend we move her appts up or is she OK to stay as she is scheduled?    Shirin Witt RN    "

## 2024-09-03 NOTE — TELEPHONE ENCOUNTER
This patient needs an early MFM referral for history of autoimmune hepatitis.  They would help guide us as well as her rheumatologist with what labs we should be following in pregnancy.  -Krystyna Wren MD

## 2024-09-04 NOTE — TELEPHONE ENCOUNTER
MFM referral placed.   Message sent to patient    Caroline THOMPSON RN   [facility] Triage RN

## 2024-09-09 DIAGNOSIS — K75.4 AUTOIMMUNE HEPATITIS (H): Primary | ICD-10-CM

## 2024-09-09 DIAGNOSIS — O26.90 PREGNANCY RELATED CONDITION: ICD-10-CM

## 2024-09-11 ENCOUNTER — MYC REFILL (OUTPATIENT)
Dept: GASTROENTEROLOGY | Facility: CLINIC | Age: 34
End: 2024-09-11
Payer: COMMERCIAL

## 2024-09-11 DIAGNOSIS — K75.4 AUTOIMMUNE HEPATITIS (H): ICD-10-CM

## 2024-09-11 RX ORDER — AZATHIOPRINE 50 MG/1
50 TABLET ORAL DAILY
Qty: 180 TABLET | Refills: 0 | Status: SHIPPED | OUTPATIENT
Start: 2024-09-11 | End: 2024-09-19

## 2024-09-19 ENCOUNTER — VIRTUAL VISIT (OUTPATIENT)
Dept: OBGYN | Facility: CLINIC | Age: 34
End: 2024-09-19
Payer: COMMERCIAL

## 2024-09-19 VITALS — BODY MASS INDEX: 32.89 KG/M2 | HEIGHT: 64 IN

## 2024-09-19 DIAGNOSIS — K76.6 PORTAL HYPERTENSION (H): ICD-10-CM

## 2024-09-19 DIAGNOSIS — K75.4 AUTOIMMUNE HEPATITIS (H): Primary | ICD-10-CM

## 2024-09-19 DIAGNOSIS — O09.529 ENCOUNTER FOR SUPERVISION OF HIGH-RISK PREGNANCY WITH MULTIGRAVIDA OF ADVANCED MATERNAL AGE: Primary | ICD-10-CM

## 2024-09-19 DIAGNOSIS — Z23 NEED FOR DIPHTHERIA-TETANUS-PERTUSSIS (TDAP) VACCINE: ICD-10-CM

## 2024-09-19 PROBLEM — O10.919 CHRONIC HYPERTENSION IN PREGNANCY: Status: ACTIVE | Noted: 2020-06-23

## 2024-09-19 PROBLEM — O24.419 GDM (GESTATIONAL DIABETES MELLITUS): Status: ACTIVE | Noted: 2020-11-13

## 2024-09-19 PROBLEM — K72.00 ACUTE LIVER FAILURE WITHOUT HEPATIC COMA: Status: ACTIVE | Noted: 2021-07-02

## 2024-09-19 PROCEDURE — 99207 PR NO CHARGE NURSE ONLY: CPT | Mod: 93

## 2024-09-19 NOTE — PROGRESS NOTES
Important Information for Provider:     New ob nurse intake by phone, second pregnancy,AMA.  Recent pregnancy 1/29/2021, history of gestational hypertension( white coat syndrome), she will monitor her blood pressures at home( they are normal readings at home). She had gestational diabetes, diet controlled   Handouts reviewed. Genetic screening scheduled for 10/22/2024.  Ultrasound and NOB with Dr Alex 10/15/2024  A1C ordered with NOB labs.  Patient has autoimmune hepatitis, takes Imuran(lowest does) according to her gastrologist  low dose is ok in pregnancy      Caffeine intake/servings daily - 1  Calcium intake/servings daily - 3  Exercise 5 times weekly - describe ; walks, precautions given  Sunscreen used - Yes  Seatbelts used - Yes  Guns stored in the home - No  Self Breast Exam - Yes  Pap test up to date -  Yes  Dental exam up to date -  No  Immunizations reviewed and up to date - Yes  Abuse: Current or Past (Physical, Sexual or Emotional) - No  Do you feel safe in your environment - Yes  Do you cope well with stress - Yes    Prenatal OB Questionnaire  Patient supplied answers from flow sheet for:  Prenatal OB Questionnaire.  Past Medical History  Have you ever received care for your mental health? : No  Have you ever been in a major accident or suffered serious trauma?: No  Within the last year, has anyone hit, slapped, kicked or otherwise hurt you?: No  In the last year, has anyone forced you to have sex when you didn't want to?: No    Past Medical History 2   Have you ever received a blood transfusion?: No  Would you accept a blood transfusion if was medically recommended?: Yes  Does anyone in your home smoke?: No   Is your blood type Rh negative?: No  Have you ever ?: No  Have you been hospitalized for a nonsurgical reason excluding normal delivery?: No  Have you ever had an abnormal pap smear?: No    Past Medical History (Continued)  Do you have a history of abnormalities of the uterus?: No  Did  your mother take KIMMY or any other hormones when she was pregnant with you?: No  Do you have any other problems we have not asked about which you feel may be important to this pregnancy?: No                Allergies as of 9/19/2024:    Allergies as of 09/19/2024 - Reviewed 09/19/2024   Allergen Reaction Noted    Penicillins GI Disturbance 07/23/2021               Early ultrasound screening tool:    Does patient have irregular periods?  No  Did patient use hormonal birth control in the three months prior to positive urine pregnancy test? No  Is the patient breastfeeding?  No  Is the patient 10 weeks or greater at time of education visit?  No

## 2024-09-30 ENCOUNTER — LAB (OUTPATIENT)
Dept: LAB | Facility: CLINIC | Age: 34
End: 2024-09-30
Payer: COMMERCIAL

## 2024-09-30 DIAGNOSIS — K75.4 AUTOIMMUNE HEPATITIS (H): ICD-10-CM

## 2024-09-30 DIAGNOSIS — K76.6 PORTAL HYPERTENSION (H): ICD-10-CM

## 2024-09-30 LAB
ALBUMIN SERPL BCG-MCNC: 4.1 G/DL (ref 3.5–5.2)
ALP SERPL-CCNC: 39 U/L (ref 40–150)
ALT SERPL W P-5'-P-CCNC: 13 U/L (ref 0–50)
ANION GAP SERPL CALCULATED.3IONS-SCNC: 11 MMOL/L (ref 7–15)
AST SERPL W P-5'-P-CCNC: 19 U/L (ref 0–45)
BILIRUB DIRECT SERPL-MCNC: 0.3 MG/DL (ref 0–0.3)
BILIRUB SERPL-MCNC: 1 MG/DL
BUN SERPL-MCNC: 9.4 MG/DL (ref 6–20)
CALCIUM SERPL-MCNC: 9.2 MG/DL (ref 8.8–10.4)
CHLORIDE SERPL-SCNC: 106 MMOL/L (ref 98–107)
CREAT SERPL-MCNC: 0.61 MG/DL (ref 0.51–0.95)
EGFRCR SERPLBLD CKD-EPI 2021: >90 ML/MIN/1.73M2
ERYTHROCYTE [DISTWIDTH] IN BLOOD BY AUTOMATED COUNT: 14.7 % (ref 10–15)
GLUCOSE SERPL-MCNC: 102 MG/DL (ref 70–99)
HCO3 SERPL-SCNC: 22 MMOL/L (ref 22–29)
HCT VFR BLD AUTO: 38.8 % (ref 35–47)
HGB BLD-MCNC: 13 G/DL (ref 11.7–15.7)
INR PPP: 1.12 (ref 0.85–1.15)
MCH RBC QN AUTO: 28.6 PG (ref 26.5–33)
MCHC RBC AUTO-ENTMCNC: 33.5 G/DL (ref 31.5–36.5)
MCV RBC AUTO: 85 FL (ref 78–100)
PLATELET # BLD AUTO: 126 10E3/UL (ref 150–450)
POTASSIUM SERPL-SCNC: 4.2 MMOL/L (ref 3.4–5.3)
PROT SERPL-MCNC: 6.5 G/DL (ref 6.4–8.3)
RBC # BLD AUTO: 4.55 10E6/UL (ref 3.8–5.2)
SODIUM SERPL-SCNC: 139 MMOL/L (ref 135–145)
WBC # BLD AUTO: 5.4 10E3/UL (ref 4–11)

## 2024-09-30 PROCEDURE — 82248 BILIRUBIN DIRECT: CPT

## 2024-09-30 PROCEDURE — 85610 PROTHROMBIN TIME: CPT

## 2024-09-30 PROCEDURE — 36415 COLL VENOUS BLD VENIPUNCTURE: CPT

## 2024-09-30 PROCEDURE — 80053 COMPREHEN METABOLIC PANEL: CPT

## 2024-09-30 PROCEDURE — 85027 COMPLETE CBC AUTOMATED: CPT

## 2024-10-10 ENCOUNTER — MYC MEDICAL ADVICE (OUTPATIENT)
Dept: GASTROENTEROLOGY | Facility: CLINIC | Age: 34
End: 2024-10-10
Payer: COMMERCIAL

## 2024-10-10 DIAGNOSIS — K75.4 AUTOIMMUNE HEPATITIS (H): Primary | ICD-10-CM

## 2024-10-10 RX ORDER — AZATHIOPRINE 50 MG/1
50 TABLET ORAL DAILY
Qty: 90 TABLET | Refills: 1 | Status: SHIPPED | OUTPATIENT
Start: 2024-10-10

## 2024-10-14 LAB
ABO/RH(D): NORMAL
ANTIBODY SCREEN: NEGATIVE
SPECIMEN EXPIRATION DATE: NORMAL

## 2024-10-15 ENCOUNTER — PRENATAL OFFICE VISIT (OUTPATIENT)
Dept: OBGYN | Facility: CLINIC | Age: 34
End: 2024-10-15
Payer: COMMERCIAL

## 2024-10-15 ENCOUNTER — ANCILLARY PROCEDURE (OUTPATIENT)
Dept: ULTRASOUND IMAGING | Facility: CLINIC | Age: 34
End: 2024-10-15
Payer: COMMERCIAL

## 2024-10-15 VITALS
SYSTOLIC BLOOD PRESSURE: 140 MMHG | TEMPERATURE: 98.5 F | OXYGEN SATURATION: 99 % | BODY MASS INDEX: 34.02 KG/M2 | DIASTOLIC BLOOD PRESSURE: 90 MMHG | HEART RATE: 125 BPM | WEIGHT: 198.2 LBS | RESPIRATION RATE: 20 BRPM

## 2024-10-15 DIAGNOSIS — O09.529 ENCOUNTER FOR SUPERVISION OF HIGH-RISK PREGNANCY WITH MULTIGRAVIDA OF ADVANCED MATERNAL AGE: ICD-10-CM

## 2024-10-15 DIAGNOSIS — O09.529 ENCOUNTER FOR SUPERVISION OF HIGH-RISK PREGNANCY WITH MULTIGRAVIDA OF ADVANCED MATERNAL AGE: Primary | ICD-10-CM

## 2024-10-15 DIAGNOSIS — Z12.4 CERVICAL CANCER SCREENING: ICD-10-CM

## 2024-10-15 LAB
ALBUMIN UR-MCNC: NEGATIVE MG/DL
APPEARANCE UR: CLEAR
BILIRUB UR QL STRIP: NEGATIVE
COLOR UR AUTO: YELLOW
ERYTHROCYTE [DISTWIDTH] IN BLOOD BY AUTOMATED COUNT: 14.4 % (ref 10–15)
EST. AVERAGE GLUCOSE BLD GHB EST-MCNC: 94 MG/DL
GLUCOSE UR STRIP-MCNC: NEGATIVE MG/DL
HBA1C MFR BLD: 4.9 % (ref 0–5.6)
HCT VFR BLD AUTO: 39.8 % (ref 35–47)
HGB BLD-MCNC: 13.2 G/DL (ref 11.7–15.7)
HGB UR QL STRIP: ABNORMAL
KETONES UR STRIP-MCNC: NEGATIVE MG/DL
LEUKOCYTE ESTERASE UR QL STRIP: ABNORMAL
MCH RBC QN AUTO: 28.7 PG (ref 26.5–33)
MCHC RBC AUTO-ENTMCNC: 33.2 G/DL (ref 31.5–36.5)
MCV RBC AUTO: 87 FL (ref 78–100)
NITRATE UR QL: NEGATIVE
PH UR STRIP: 6.5 [PH] (ref 5–7)
PLATELET # BLD AUTO: 115 10E3/UL (ref 150–450)
RBC # BLD AUTO: 4.6 10E6/UL (ref 3.8–5.2)
SP GR UR STRIP: 1.01 (ref 1–1.03)
UROBILINOGEN UR STRIP-ACNC: 0.2 E.U./DL
WBC # BLD AUTO: 5.1 10E3/UL (ref 4–11)

## 2024-10-15 PROCEDURE — 87389 HIV-1 AG W/HIV-1&-2 AB AG IA: CPT | Performed by: OBSTETRICS & GYNECOLOGY

## 2024-10-15 PROCEDURE — 87340 HEPATITIS B SURFACE AG IA: CPT | Performed by: OBSTETRICS & GYNECOLOGY

## 2024-10-15 PROCEDURE — 36415 COLL VENOUS BLD VENIPUNCTURE: CPT | Performed by: OBSTETRICS & GYNECOLOGY

## 2024-10-15 PROCEDURE — 81003 URINALYSIS AUTO W/O SCOPE: CPT | Performed by: OBSTETRICS & GYNECOLOGY

## 2024-10-15 PROCEDURE — 82565 ASSAY OF CREATININE: CPT | Performed by: OBSTETRICS & GYNECOLOGY

## 2024-10-15 PROCEDURE — 86780 TREPONEMA PALLIDUM: CPT | Performed by: OBSTETRICS & GYNECOLOGY

## 2024-10-15 PROCEDURE — 86762 RUBELLA ANTIBODY: CPT | Performed by: OBSTETRICS & GYNECOLOGY

## 2024-10-15 PROCEDURE — 87086 URINE CULTURE/COLONY COUNT: CPT | Performed by: OBSTETRICS & GYNECOLOGY

## 2024-10-15 PROCEDURE — 86900 BLOOD TYPING SEROLOGIC ABO: CPT | Performed by: OBSTETRICS & GYNECOLOGY

## 2024-10-15 PROCEDURE — 86901 BLOOD TYPING SEROLOGIC RH(D): CPT | Performed by: OBSTETRICS & GYNECOLOGY

## 2024-10-15 PROCEDURE — 83036 HEMOGLOBIN GLYCOSYLATED A1C: CPT | Performed by: OBSTETRICS & GYNECOLOGY

## 2024-10-15 PROCEDURE — 85027 COMPLETE CBC AUTOMATED: CPT | Performed by: OBSTETRICS & GYNECOLOGY

## 2024-10-15 PROCEDURE — 76801 OB US < 14 WKS SINGLE FETUS: CPT | Performed by: OBSTETRICS & GYNECOLOGY

## 2024-10-15 PROCEDURE — 86850 RBC ANTIBODY SCREEN: CPT | Performed by: OBSTETRICS & GYNECOLOGY

## 2024-10-15 PROCEDURE — 99204 OFFICE O/P NEW MOD 45 MIN: CPT | Performed by: OBSTETRICS & GYNECOLOGY

## 2024-10-15 PROCEDURE — 87624 HPV HI-RISK TYP POOLED RSLT: CPT | Performed by: OBSTETRICS & GYNECOLOGY

## 2024-10-15 PROCEDURE — 86803 HEPATITIS C AB TEST: CPT | Performed by: OBSTETRICS & GYNECOLOGY

## 2024-10-15 PROCEDURE — G0145 SCR C/V CYTO,THINLAYER,RESCR: HCPCS | Performed by: OBSTETRICS & GYNECOLOGY

## 2024-10-15 PROCEDURE — 84156 ASSAY OF PROTEIN URINE: CPT | Performed by: OBSTETRICS & GYNECOLOGY

## 2024-10-16 LAB
ALBUMIN MFR UR ELPH: 7.1 MG/DL
BACTERIA UR CULT: NORMAL
CREAT SERPL-MCNC: 0.59 MG/DL (ref 0.51–0.95)
CREAT UR-MCNC: 58.2 MG/DL
EGFRCR SERPLBLD CKD-EPI 2021: >90 ML/MIN/1.73M2
HBV SURFACE AG SERPL QL IA: NONREACTIVE
HCV AB SERPL QL IA: NONREACTIVE
HIV 1+2 AB+HIV1 P24 AG SERPL QL IA: NONREACTIVE
HPV HR 12 DNA CVX QL NAA+PROBE: NEGATIVE
HPV16 DNA CVX QL NAA+PROBE: NEGATIVE
HPV18 DNA CVX QL NAA+PROBE: NEGATIVE
HUMAN PAPILLOMA VIRUS FINAL DIAGNOSIS: NORMAL
PROT/CREAT 24H UR: 0.12 MG/MG CR (ref 0–0.2)
RUBV IGG SERPL QL IA: 9.73 INDEX
RUBV IGG SERPL QL IA: POSITIVE
T PALLIDUM AB SER QL: NONREACTIVE

## 2024-10-18 ENCOUNTER — PRE VISIT (OUTPATIENT)
Dept: MATERNAL FETAL MEDICINE | Facility: CLINIC | Age: 34
End: 2024-10-18
Payer: COMMERCIAL

## 2024-10-18 LAB
BKR AP ASSOCIATED HPV REPORT: NORMAL
BKR LAB AP GYN ADEQUACY: NORMAL
BKR LAB AP GYN INTERPRETATION: NORMAL
BKR LAB AP PREVIOUS ABNORMAL: NORMAL
PATH REPORT.COMMENTS IMP SPEC: NORMAL
PATH REPORT.COMMENTS IMP SPEC: NORMAL
PATH REPORT.RELEVANT HX SPEC: NORMAL

## 2024-10-22 ENCOUNTER — LAB (OUTPATIENT)
Dept: LAB | Facility: CLINIC | Age: 34
End: 2024-10-22
Attending: STUDENT IN AN ORGANIZED HEALTH CARE EDUCATION/TRAINING PROGRAM
Payer: COMMERCIAL

## 2024-10-22 ENCOUNTER — MEDICAL CORRESPONDENCE (OUTPATIENT)
Dept: HEALTH INFORMATION MANAGEMENT | Facility: CLINIC | Age: 34
End: 2024-10-22

## 2024-10-22 ENCOUNTER — HOSPITAL ENCOUNTER (OUTPATIENT)
Dept: ULTRASOUND IMAGING | Facility: CLINIC | Age: 34
Discharge: HOME | End: 2024-10-22
Attending: STUDENT IN AN ORGANIZED HEALTH CARE EDUCATION/TRAINING PROGRAM
Payer: COMMERCIAL

## 2024-10-22 ENCOUNTER — OFFICE VISIT (OUTPATIENT)
Dept: MATERNAL FETAL MEDICINE | Facility: CLINIC | Age: 34
End: 2024-10-22
Attending: STUDENT IN AN ORGANIZED HEALTH CARE EDUCATION/TRAINING PROGRAM
Payer: COMMERCIAL

## 2024-10-22 VITALS
OXYGEN SATURATION: 98 % | DIASTOLIC BLOOD PRESSURE: 85 MMHG | SYSTOLIC BLOOD PRESSURE: 131 MMHG | RESPIRATION RATE: 20 BRPM | HEART RATE: 120 BPM

## 2024-10-22 DIAGNOSIS — O10.919 CHRONIC HYPERTENSION IN PREGNANCY: ICD-10-CM

## 2024-10-22 DIAGNOSIS — K75.4 AUTOIMMUNE HEPATITIS (H): Primary | ICD-10-CM

## 2024-10-22 DIAGNOSIS — O09.521 MULTIGRAVIDA OF ADVANCED MATERNAL AGE IN FIRST TRIMESTER: ICD-10-CM

## 2024-10-22 DIAGNOSIS — O09.521 MULTIGRAVIDA OF ADVANCED MATERNAL AGE IN FIRST TRIMESTER: Primary | ICD-10-CM

## 2024-10-22 DIAGNOSIS — K75.4 AUTOIMMUNE HEPATITIS (H): ICD-10-CM

## 2024-10-22 DIAGNOSIS — O26.90 PREGNANCY RELATED CONDITION: ICD-10-CM

## 2024-10-22 DIAGNOSIS — Z82.79 FAMILY HISTORY OF CONGENITAL HEART DEFECT: ICD-10-CM

## 2024-10-22 PROBLEM — Z12.4 SCREENING FOR CERVICAL CANCER: Status: ACTIVE | Noted: 2024-10-22

## 2024-10-22 PROCEDURE — 99215 OFFICE O/P EST HI 40 MIN: CPT | Mod: 25 | Performed by: STUDENT IN AN ORGANIZED HEALTH CARE EDUCATION/TRAINING PROGRAM

## 2024-10-22 PROCEDURE — 76813 OB US NUCHAL MEAS 1 GEST: CPT | Mod: 26 | Performed by: STUDENT IN AN ORGANIZED HEALTH CARE EDUCATION/TRAINING PROGRAM

## 2024-10-22 PROCEDURE — 36415 COLL VENOUS BLD VENIPUNCTURE: CPT

## 2024-10-22 PROCEDURE — 99213 OFFICE O/P EST LOW 20 MIN: CPT | Mod: 25 | Performed by: STUDENT IN AN ORGANIZED HEALTH CARE EDUCATION/TRAINING PROGRAM

## 2024-10-22 PROCEDURE — 76813 OB US NUCHAL MEAS 1 GEST: CPT

## 2024-10-22 PROCEDURE — 96040 HC GENETIC COUNSELING, EACH 30 MINUTES: CPT | Performed by: GENETIC COUNSELOR, MS

## 2024-10-22 ASSESSMENT — PAIN SCALES - GENERAL: PAINLEVEL: NO PAIN (0)

## 2024-10-22 NOTE — PROGRESS NOTES
[facility]  Genetic Counseling Consult    Patient:  Yaritza Dias YOB: 1990   Date of Service:  10/22/24   MRN: [id number]    Yaritza was seen at the [facility] for genetic counseling consultation due to advanced maternal age to discuss the options for testing for fetal chromosome abnormalities.  The patient was unaccompanied to today's visit.     IMPRESSION/ PLAN   1. Yaritza elected to proceed with NT ultrasound and Prequel NIPT through [facility]. She opted to screen for sex chromosome aneuploidies, and declined microdeletion conditions. Results are expected in 10-14 days. Yaritza provided verbal permission for results to be left on her voicemail. She requested the results include predicted fetal sex information. Patient was informed that results will also be available via [facility].    2. Yaritza has a history of autoimmune hepatitis managed with medication. She had a MFM consultation today, please see corresponding report for further details regarding ongoing pregnancy management recommendations.     3. The couple's daughter was found to have pulmonary valve stenosis requiring balloon angioplasty. We reviewed increased chance for the pregnancy. We discussed level II comprehensive ultrasound and fetal echocardiogram as screening tools for CHD in the current pregnancy.     PREGNANCY HISTORY   /Parity:    Age at Delivery: 34 year old  Gestational Age: 12w2d   ELVIN: 2025, by Last Menstrual Period             No significant complications or exposures were reported in the current pregnancy.  Yaritza's pregnancy history is significant for:   Term vaginal delivery complicated by gestational diabetes and gestational hypertension    MEDICAL HISTORY   Yaritza has a history of autoimmune hepatitis managed with medication and thrombocytopenia. She had a MFM consultation today, please see corresponding report for  "further details regarding ongoing pregnancy management recommendations.        FAMILY HISTORY   A three-generation pedigree was obtained today and is scanned under the \"Media\" tab in Epic. It is important to note that the family history provided is based on the patient's recollection and reported in the absence of medical records. If the family history changes or if more information is obtained, the patient should contact our clinic as this may alter recommendations.     The following significant findings were reported today:   The couple's daughter was found to have pulmonary valve stenosis requiring balloon angioplasty. We discussed that congenital heart defects are common, occurring in 0.5 to 1.0% live births. Isolated congenital heart defects are usually multifactorial, meaning they are often caused by the combination of genetic and environmental factors. We reviewed increased chance for the pregnancy. We discussed level II comprehensive ultrasound and fetal echocardiogram as screening tools for CHD in the current pregnancy.     Otherwise, the reported family history is unremarkable for multiple miscarriages, stillbirths, birth defects, intellectual disabilities, known genetic conditions, and consanguinity.       CARRIER SCREENING   Expanded carrier screening is available to screen for autosomal recessive conditions and X-linked conditions in a large list of genes. Autosomal recessive conditions happen when a mutation has been inherited from the egg and sperm and include conditions like cystic fibrosis, thalassemia, hearing loss, spinal muscular atrophy, and more. X-linked conditions happen when a mutation has been inherited from the egg and include conditions like fragile X syndrome.  screening was also reviewed.    The patient did not elect to pursue the carrier screening options discussed today, however is aware these remain available.        RISK ASSESSMENT FOR CHROMOSOME CONDITIONS   We explained " that the risk for fetal chromosome abnormalities increases with maternal age. We discussed specific features of common chromosome abnormalities, including Down syndrome, trisomy 13, trisomy 18, and sex chromosome trisomies.    At age 34 at midtrimester, the risk to have a baby with Down syndrome is 1 in 274.   At age 34 at midtrimester, the risk to have a baby with any chromosome abnormality is 1 in 135.     GENETIC TESTING OPTIONS   Genetic testing during a pregnancy includes screening and diagnostic procedures.      We discussed the following screening options:   Non-invasive prenatal testing (NIPT)  Also called cell-free DNA screening because it detects chromosomes from the placenta in the pregnant person's blood  Can be done any time after 10 weeks gestation  Screens for trisomy 21, trisomy 18, trisomy 13, and sex chromosome aneuploidies  Cannot screen for open neural tube defects, maternal serum AFP after 15 weeks is recommended  We also discussed that current ACMG guidelines recommend that screening for 22q11.2 deletion syndrome be offered to all pregnant patients. 22q11.2 deletion syndrome has an estimated prevalence of 1 in 990 to 1 in 2148 (0.05-0.1%). Risk is not thought to increase with maternal age. Clinical features are variable but include congenital heart defects, cleft palate, developmental delays, immune system deficiencies, and hearing loss. Approximately 90% of cases are de leta (a sporadic new change in a pregnancy). Cell-free DNA screening for 22q11.2 deletion syndrome is available with the inclusion of other microdeletion syndromes that are not currently recommended by society guidelines. We discussed the limitations of cell-free DNA screening in detecting microdeletions and the possiblity of false positives and false negatives.      We discussed the following ultrasound options:  Nuchal translucency (NT) ultrasound  Ultrasound between [id number] that includes nuchal translucency measurement  and nasal bone assessments  Nuchal translucency refers to the space at the back of the neck where fluid builds up. All babies at this stage have fluid and there is only concern if there is too much fluid  Nasal bone refers to the small bone in the nose. There is concern for conditions like Down syndrome if the bone cannot be seen at all  This ultrasound can be done as part of first trimester screening, at the same time as another screen (NIPT), at the same time as a CVS, or if the patients does not want genetic screening.  Markers on ultrasound detects about 70% of pregnancies with aneuploidy  Abnormalities on NT ultrasound can also increase the risk for a birth defect, like a heart defect    Comprehensive level II ultrasound (Fetal Anatomy Ultrasound)  Ultrasound done between 18-20 weeks gestation  Screens for major birth defects and markers for aneuploidy (like trisomy 21 and trisomy 18)  Includes looking at the fetus/baby's growth, heart, organs (stomach, kidneys), placenta, and amniotic fluid    Fetal Echocardiogram  Ultrasound done between 22-24 weeks gestation  Screen for heart defects  Recommended if there are concerns about the heart or other indications like IVF pregnancy or maternal diabetes    We discussed the following diagnostic options:   Chorionic villus sampling (CVS)  Invasive diagnostic procedure done between 10w0d and 13w6d  The procedure collects a small sample from the placenta for the purpose of chromosomal testing and/or other genetic testing  Diagnostic result; more than 99% sensitivity for fetal chromosome abnormalities  Cannot screen for open neural tube defects, maternal serum AFP after 15 weeks is recommended    Amniocentesis  Invasive diagnostic procedure done after 15 weeks gestation  The procedure collects a small sample of amniotic fluid for the purpose of chromosomal testing and/or other genetic testing  Diagnostic result; more than 99% sensitivity for fetal chromosome  abnormalities  Testing for AFP in the amniotic fluid can test for open neural tube defects    The benefits and limitations of noninvasive screening were reviewed. Screening tests provide a risk assessment (chance) specific to the pregnancy for certain fetal chromosome abnormalities but cannot definitively diagnose or exclude a fetal chromosome abnormality. Follow-up genetic counseling and consideration of diagnostic testing is recommended with any abnormal screening result. Diagnostic testing during a pregnancy is more certain and can test for more conditions. However, the tests do have a risk of miscarriage that requires careful consideration. These tests can detect fetal chromosome abnormalities with greater than 99% certainty. Results can be compromised by maternal cell contamination or mosaicism and are limited by the resolution of current genetic testing technology. There is no screening or diagnostic test that detects all forms of birth defects or intellectual disability.     It was a pleasure to be involved with Yaritza reza lu. Face-to-face time of the meeting was 20 minutes.    She Ambrosio MS, [facility]  Licensed Genetic Counselor  [facility]  Maternal Fetal Medicine  Ph: [phone]  [email or web address]

## 2024-10-22 NOTE — PROGRESS NOTES
"    [facility]  [address] [place] [address] [address], [place] [address]  Main: [phone], Fax: [phone]       Referring Provider:  Dr. Siena COOL     Yaritza Dias is a 34 year old  at 12w2d by 11w5d US here for MFM consultation for history of autoimmune hepatitis with thrombocytopenia, chronic hypertension, AMA.    She was seen for MFM preconception consultation by Dr. Rubio 23. Please see that note for full details regarding previous history and counseling. In brief, Ms. Dias was diagnosed with autoimmune hepatitis after presenting with jaundice in 2021 after she had her COVID vaccine in May 2021. Her initial work-up was notable for   TBR 12.8 DBR 7.6 INR 1.6. She had an MRI 2021 that showed liver masses and cysts and underwent liver biopsy that showed severe hepatitis necrosis, thought to be autoimmune hepatitis, was started on prednisone 40 mg on . Weaned off steroids 2021. She underwent MRCP in 2022 which was relatively unchanged since her last MRI and had a follow up liver bx 3/2022 showing \"regenerative changes consistent with resolved severe hepatitis\" as well as an EGD showing no varices. She was started on imuran 50 mg daily and ursodiol 300 mg BID. Has had persistent thrombocytopenia attributed to Imuran and splenomegaly. Last visit with Dr. Borden 2023. Not currently on Ursodiol.    At the time of her visit with Dr. Rubio, she was advised to wait to conceive until 1 year after her last flare. This pregnancy was conceived spontaneously. In February of this year she discontinued Ursodiol and has been very stable on Imuran without flares since her previous visit.    Her recent history was also notable for laser lithotripsy and placement of a right ureteral stent on  in the setting of a ureteral stone. The stent was removed several days later and she has been stable since.    Regarding history of chronic hypertension, " some notes in last pregnancy allude to gestational hypertension versus chronic hypertension however she was noted to have multiple mild range BP <20 weeks and prior to pregnancy. She is not currently on medication for BP.    Ms. Pepe's primary concern today is ensuring that there is a plan for management of her AIH in pregnancy. Her hepatologist, Dr. Borden, has previously recommended annual visits and PRN labs given stable disease. Her next visit is scheduled in February. She also wants to confirm that she needs an EGD this pregnancy.     Denies vaginal bleeding, LOF, pain, contractions, other concerns.    Prenatal Care:  Primary OB care this pregnancy has been with Dr. Wren from [facility]     OB History    Para Term  AB Living   2 1 1 0 0 1   SAB IAB Ectopic Multiple Live Births   0 0 0 0 1      # Outcome Date GA Lbr Eric/2nd Weight Sex Type Anes PTL Lv   2 Current            1 Term 21 38w6d 05:53 / 02:33 2.91 kg (6 lb 6.7 oz) F Vag-Spont  N MIKEL      Name: SNEHA PEPE      Apgar1: 8  Apgar5: 9       Gynecologic History   - Pap: 10/15/24 NILM, HPV neg    Past Medical History     Past Medical History:   Diagnosis Date    Autoimmune hepatitis (H)     Gestational diabetes     Motion sickness        Past Surgical History     Past Surgical History:   Procedure Laterality Date    COMBINED CYSTOSCOPY, RETROGRADES, URETEROSCOPY, LASER HOLMIUM LITHOTRIPSY URETER(S), INSERT STENT Right 2024    Procedure: CYSTOURETEROSCOPY, WITH RETROGRADE PYELOGRAM, LASER LITHOTRIPSY OF URETERAL CALCULUS,STONE BASKSET RETRIVAL AND STENT INSERTION;  Surgeon: Naldo Neil MD;  Location: [facility]    ESOPHAGOSCOPY, GASTROSCOPY, DUODENOSCOPY (EGD), COMBINED N/A 2022    Procedure: ESOPHAGOGASTRODUODENOSCOPY (EGD);  Surgeon: Ashlee Borden MD;  Location: [facility] OR    IR LIVER BIOPSY PERCUTANEOUS  3/18/2022       Medication List     Prior to Admission  medications    Medication Sig Last Dose Taking? Auth Provider Long Term End Date   azaTHIOprine (IMURAN) 50 MG tablet Take 1 tablet (50 mg) by mouth daily. Taking Yes Ashlee Borden MD Yes    Prenatal Vit-DSS-Fe Cbn-FA (PRENATAL AD PO) Take by mouth. Taking Yes Reported, Patient         Allergies   Penicillins    Social History   Denies use of alcohol, drugs or smoking.    Family History   Please see genetic counseling note for detailed 3-generation family history  Family history significant for daughter with pulmonary valve stenosis, treated with balloon angioplasty.    Specifically, denies family history of motor/intellectual impairment, genetic or chromosome abnormalities or congenital anomalies.      Review of Systems   ROS negative except as in HPI.    Physical Exam   /85 (BP Location: Left arm, Patient Position: Sitting, Cuff Size: Adult Large)   Pulse 120   Resp 20   LMP 07/28/2024   SpO2 98%     Gen: NAD  CV: RRR  Resp: CTAB  Abd: Gravid, non-tender  Ext: No edema    Labs      Latest Reference Range & Units 10/15/24 15:28   Creatinine 0.51 - 0.95 mg/dL 0.59   GFR Estimate >60 mL/min/1.73m2 >90   Creatinine Urine mg/dL 58.2   Estimated Average Glucose <117 mg/dL 94   Hemoglobin A1C 0.0 - 5.6 % 4.9   WBC 4.0 - 11.0 10e3/uL 5.1   Hemoglobin 11.7 - 15.7 g/dL 13.2   Hematocrit 35.0 - 47.0 % 39.8   Platelet Count 150 - 450 10e3/uL 115 (L)   RBC Count 3.80 - 5.20 10e6/uL 4.60   MCV 78 - 100 fL 87   MCH 26.5 - 33.0 pg 28.7   MCHC 31.5 - 36.5 g/dL 33.2   RDW 10.0 - 15.0 % 14.4   ABO/Rh(D)  B POS   Antibody Screen Negative  Negative   SPECIMEN EXPIRATION DATE  [id number]   Color Urine Colorless, Straw, Light Yellow, Yellow  Yellow   Appearance Urine Clear  Clear   Glucose Urine Negative mg/dL Negative   Bilirubin Urine Negative  Negative   Ketones Urine Negative mg/dL Negative   Specific Gravity Urine 1.003 - 1.035  1.010   pH Urine 5.0 - 7.0  6.5   Protein Albumin Urine Negative mg/dL Negative  "  Urobilinogen Urine 0.2, 1.0 E.U./dL 0.2   Nitrite Urine Negative  Negative   Blood Urine Negative  Moderate !   Leukocyte Esterase Urine Negative  Small !   Treponema Antibodies Nonreactive  Nonreactive   URINE CULTURE  Rpt   Hep B Surface Agn Nonreactive  Nonreactive   Hepatitis C Antibody Nonreactive  Nonreactive   HIV Antigen Antibody Combo Nonreactive  Nonreactive   Rubella Mag IgG Instrument Value <0.90 Index 9.73   Rubella Antibody IgG  Positive   (L): Data is abnormally low  !: Data is abnormal   Latest Reference Range & Units 09/30/24 08:49   Sodium 135 - 145 mmol/L 139   Potassium 3.4 - 5.3 mmol/L 4.2   Chloride 98 - 107 mmol/L 106   Carbon Dioxide (CO2) 22 - 29 mmol/L 22   Urea Nitrogen 6.0 - 20.0 mg/dL 9.4   Creatinine 0.51 - 0.95 mg/dL 0.61   GFR Estimate >60 mL/min/1.73m2 >90   Calcium 8.8 - 10.4 mg/dL 9.2   Anion Gap 7 - 15 mmol/L 11   Albumin 3.5 - 5.2 g/dL 4.1   Protein Total 6.4 - 8.3 g/dL 6.5   Alkaline Phosphatase 40 - 150 U/L 39 (L)   ALT 0 - 50 U/L 13   AST 0 - 45 U/L 19   Bilirubin Direct 0.00 - 0.30 mg/dL 0.30   Bilirubin Total <=1.2 mg/dL 1.0   (L): Data is abnormally low     Latest Reference Range & Units 09/30/24 08:49   INR 0.85 - 1.15  1.12     AMA negative  ROLANDO + 1:640  F actin 9  IgG normal  Anti LKM negative  SLA negative     Hepatitis A IgG positive, IgM negative  Hepatitis B Sag negative, core negative, ab immune  TPMT normal 30.9 (22-44)    Imaging   See today's ultrasound report under the \"imaging\" tab.    MRI ABDOMEN (2/19/2022):  1. Lobulated nodular liver parenchyma with capsular retraction and nodular areas of hypoenhancement, grossly stable from 7/3/2021 with no convincing acute MRI findings on the present study.  2. Splenomegaly. No ascites.  3. Additional incidental findings as described above.    MR liver 06/2023:  1.  Similar appearance of the liver with a lobulated and nodular contour with areas of marked atrophy. No suspicious liver mass.  2.  " Splenomegaly.    2023 US abdomen:    Cirrhotic appearing liver  No liver mass  Splenomegaly measuring 18 cm  No ascites    Assessment/Counseling     Yaritza Dias is a 34 year old  at 12w2d by 11w5d US here for MFM consultation for history of autoimmune hepatitis with thrombocytopenia, chronic hypertension, AMA. Patient's daughter also with history of pulmonary valve stenosis requiring balloon angioplasty.     Autoimmune Hepatitis  AIH is a diagnosis of exclusion, and serologic evidence of antinuclear and anti-smooth muscle antibodies is common. In younger patients with AIH, antibodies that cross-react with liver-kidney microsomes may be detectable.     The most commonly used immunosuppressive agents are prednisone and azathioprine, although cyclosporin and tacrolimus may be used in selected circumstances. If disease activity is well controlled, most women have a good prognosis for pregnancy. However, approximately 33% of patients have an AIH flare after delivery.     A large, single-center study of 81 women with AIH found that 33 (41%) pregnancies occurred in the context of cirrhosis. At conception, 61 patients (75%) were on therapy for AIH. The live birth rate was 73% (59 of 81). Prematurity affected 12 (20%) of 59 pregnancies, and 6 infants (11%) required admission to a level 1  care unit. The overall maternal complication rate was 38% (31 of 81). A flare of disease activity occurred in 26 (33%) of 81 pregnancies. A serious maternal adverse event (i.e., death or need for liver transplantation) during or within 12 months of delivery or hepatic decompensation during or within 3 months of delivery occurred with 9 pregnancies (11%) and was more common among women with cirrhosis (P = .028).     Maternal therapy had no significant impact on the live birth rate, termination rate, or gestational period. AIH flares were more likely in patients who were not on therapy or who had a disease flare in  the year before conception. Patients who had a flare associated with pregnancy were more likely to decompensate because of liver dysfunction. Given the reassuring data about the use of azathioprine during pregnancy and breast-feeding, women with AIH should continue treatment, because the risk of a flare outweighs the potential risks of treatment. She is currently on Imuran.    At her preconception visit an abdominal ultrasound was recommended to rule out splenic artery aneurysm. This was completed and did not report an aneurysm. SSA antibody evaluation to assess for risk of fetal heart block was also recommended in the setting of an autoimmune condition and that was negative as well.    Chronic Hypertension  Patients with chronic hypertension are more likely to develop preeclampsia during the pregnancy. Low dose aspirin has been used to lower this risk.  Chronic hypertension also increases the risk of maternal stroke, pulmonary edema, renal failure, gestational diabetes, iatrogenic  birth, fetal growth restriction, placental abruption and  mortality rate including stillbirth Without baseline laboratory assessment, it may be difficult to distinguish an exacerbation of hypertension from preeclampsia, especially in the third trimester. It is generally anticipated that blood pressure will gradually decrease during early pregnancy, reaching at aminata at 28-32 weeks, and then slowly rise to pre-pregnancy levels.     There is new data to suggest that improved blood pressure control during pregnancy reduces the risk of developing superimposed preeclampsia with severe features, abruption,  delivery < 35 weeks, and fetal/ death without an increased risk of fetal growth restriction/small for gestation age infants. Based on these findings it is recommended that women with chronic hypertension be initiated on medications, prior to 20 weeks, to keep blood pressures < 140/90 mmHg both pre pregnancy  "and throughout gestation. We generally recommend home blood pressure monitoring to assist with medication titration as well as to monitor her for the development of preeclampsia.  In the event of new elevations in blood pressures after 20 weeks gestation, we would recommend thorough evaluation for preeclampsia prior to medication dose escalation. Labetalol or long-acting nifedipine safe options for blood pressure control in pregnancy.     AMA  The risk of fetal aneuploidy increases with age. Patients of advanced age are also at increased risks of pregnancy complications, including miscarriage, preeclampsia, abnormalities with placentation, stillbirth, and  delivery.  These risks increase with increasing maternal age and comorbidities.     Recommendations     Continue Imuran, ursodiol if indicated  Schedule EGD to assess for varices, preferably in the beginning of the second trimester  RUQ US every 6 months for liver cancer screening  Thrombocytopenia most likely due to splenomegaly and Imuran use. Monitor closely, especially in setting of increased risk for preeclampsia. Recommend monitoring PLT monthly, LFTs at least qtrimester if stable  Continue to follow with hepatology, Dr. Borden. Last visit 2023, recommend updated visit.  In Dr. Borden's note from 2023, there was mention \"Even though she only had periportal fibrosis in the biopsy, essentially feel she has some noncirrhotic portal hypertension, splenomegaly.\" Will reach out to Dr. Borden to clarify. If portal hypertension, would need to discuss becoming MFM primary. Patient denies known history of portal hypertension.  Initiate necessary medications and titrate as needed to achieve blood pressure goal <140/90  ASA 81 mg daily  Comprehensive anatomy ultrasound at 18-20 weeks gestation  Fetal echocardiogram at 22 weeks gestation for 1st degree relative history  Baseline urine protein/creatinine ratio has been obtained and is 0.12  Close " monitoring evidence of superimposed preeclampsia  Home BP cuff and evaluation; BP cuff should be titrated in the office  More frequent prenatal visits are indicated with hypertension when medication modifications are necessary  Calling parameters should be clearly laid out with regards to blood pressure and symptoms  Repeat labs as clinically indicated, with a low threshold to rule-out superimposed preeclampsia, especially if it is thought that medication may need to be increased  Serial ultrasounds for growth monthly starting at 28 weeks gestation.   fetal surveillance with weekly BPP (or NST and MVP) starting at 32 weeks  Platelets will need to be >20-30k for vaginal delivery and >50k for   Increased risk of AIH flare in the postpartum period, will require close monitoring of labs and symptoms    The patient was seen and evaluated with Dr. Dodd.    At the end of our discussion, Ms. Dias indicated that her questions were answered and she seemed satisfied with our discussion.  Thank you for allowing us to participate in the care of your patient. Please do not hesitate to contact us if you have further questions regarding the management of your patient.       Sincerely,  Tomasz Henao MD  Obstetrics & Gynecology, PGY-2  10/23/2024 9:30 AM       [facility] Attending Attestation  I have seen and evaluated the patient with Dr. Henao. I reviewed her chart and agree with the above documented assessment and plan. I spent a total of 45 minutes on the date of this encounter including preparing to see the patient (reviewing medical records/tests), counseling and discussing the plan of care, documenting the visit in the electronic medical record, and communicating with other health care professionals and/or care coordination.Please see her note for specific details; I have made the necessary edits/additions.  The patient was also seen for an ultrasound in the [facility] at the [facility]  [place] clinic today. For a detailed report of the ultrasound examination, please see the ultrasound report which can be found under the imaging tab.  Bettye Dodd MD   Maternal Fetal Medicine Physician     ADDENDUM:  In discussion with Dr. Borden, Yaritza does have portal hypertension. She also has not had abdominal US with Doppler to evaluate for splenic artery aneurysm. Dr. Borden, myself, and [facility] recommend this patient be MFM primary in pregnancy. This was communicated with the patient. She has upper endoscopy scheduled 11/7/24. Will see for transfer of care OB visit on 11/22/24.    Bettye Dodd MD

## 2024-10-22 NOTE — NURSING NOTE
Yaritza was seen in MFM Clinic today for GC, NT, and MFM consult due to preg c/b GhTN, H/O diet controlled GDM and autoimmune hepatitis.   Dr. Dodd into see patient.  Pt states she checks her BP at home and gets 124-128/80's.  Please see MFM consult note for recommendations.  Patient was discharged ambulatory and stable.

## 2024-10-24 NOTE — PROGRESS NOTES
OB - New OB History and Physical    HPI: Yaritza Pepe is a 34 year old  at 11w2d by LMP consistent with today's ultrasound.   Estimated Date of Delivery: May 4, 2025. Since becoming pregnant she has been feeling ok and denies vaginal bleeding.     She has a history of autoimmune hepatitis which was diagnosed since the birth of her last child but has been very stable without any flares over the past year, she continues to follow with GI and also has a MFM consult scheduled     Obstetric history:     OB History    Para Term  AB Living   2 1 1 0 0 1   SAB IAB Ectopic Multiple Live Births   0 0 0 0 1      # Outcome Date GA Lbr Eric/2nd Weight Sex Type Anes PTL Lv   2 Current            1 Term 21 38w6d 05:53 / 02:33 2.91 kg (6 lb 6.7 oz) F Vag-Spont  N MIKEL      Name: SNEHA PEPE      Apgar1: 8  Apgar5: 9     Patient has a history of GDMA1 with prior pregnancy, also with cHTN but denies pre-term labor or pre-eclampsia     Gynecologic History:   Patient's last menstrual period was 2024.   STI history: denies   Last Pap: NIL pap in 2019   History of abnormal pap: denies     Allergy: Penicillins      Current Medications:  Current Outpatient Medications   Medication Sig Dispense Refill    azaTHIOprine (IMURAN) 50 MG tablet Take 1 tablet (50 mg) by mouth daily. 90 tablet 1    Prenatal Vit-DSS-Fe Cbn-FA (PRENATAL AD PO) Take by mouth.       No current facility-administered medications for this visit.       Past Medical History:  Past Medical History:   Diagnosis Date    Autoimmune hepatitis (H)     Gestational diabetes     Motion sickness        Past Surgical History:  Past Surgical History:   Procedure Laterality Date    COMBINED CYSTOSCOPY, RETROGRADES, URETEROSCOPY, LASER HOLMIUM LITHOTRIPSY URETER(S), INSERT STENT Right 2024    Procedure: CYSTOURETEROSCOPY, WITH RETROGRADE PYELOGRAM, LASER LITHOTRIPSY OF URETERAL CALCULUS,STONE BASKSET RETRIVAL AND STENT  INSERTION;  Surgeon: Naldo Neil MD;  Location: [place] [facility] OR    ESOPHAGOSCOPY, GASTROSCOPY, DUODENOSCOPY (EGD), COMBINED N/A 9/8/2022    Procedure: ESOPHAGOGASTRODUODENOSCOPY (EGD);  Surgeon: Ashlee Borden MD;  Location: [facility] OR    IR LIVER BIOPSY PERCUTANEOUS  3/18/2022       Social History:  Denies current tobacco, alcohol or recreational drug use.     Family History:  Family History   Problem Relation Age of Onset    Hypertension Mother     No Known Problems Father     Celiac Disease Sister     No Known Problems Sister     No Known Problems Sister     No Known Problems Maternal Grandmother     No Known Problems Maternal Grandfather     No Known Problems Paternal Grandmother     No Known Problems Paternal Grandfather     Heart Defect Daughter         Pulmonary valve stenosis    Anesthesia Reaction No family hx of     Malignant Hyperthermia No family hx of     Deep Vein Thrombosis No family hx of     Genetic Disease No family hx of     Birth defects No family hx of     Breast Cancer No family hx of     Ovarian cysts No family hx of        Review of Systems  See HPI     Physical Exam:  Vitals:    10/15/24 1417   BP: (!) 140/90   BP Location: Left arm   Patient Position: Sitting   Cuff Size: Adult Regular   Pulse: (!) 125   Resp: 20   Temp: 98.5  F (36.9  C)   SpO2: 99%   Weight: 89.9 kg (198 lb 3.2 oz)     Body mass index is 34.02 kg/m .    General: Patient alert and oriented, no acute distress  CV: no peripheral edema or cyanosis  Resp: normal respiratory effort and equal lung expansion  Ext: non-tender, no edema    Obstetrical Ultrasound Report  OB U/S  < 14 Weeks -  Transabdominal   [facility] Obstetrics [facility] Gynecology     Referring Provider: Hazel Alex MD   Sonographer: Mojgan German RDMS  Indication:  Viability check  and Size and Dates     Dating (mm/dd/yyyy):   LMP: 07/28/2024            EDC:  05/04/2025            GA by LMP:         11w2d     Current Scan  On:  10/15/2024        EDC:  2025            GA by Current Scan:        11w5d  The calculation of the gestational age by current scan was based on CRL.     Anatomy Scan:  Villalta gestation.     Biometry:  Gest Sac MSD                5.24 cm  CRL                                4.87 cm                11w5d                    Yolk Sac                         4.5 mm                                       Fetal cardiac activity:  Rate and rhythm is within normal limits.  Fetal heart rate: 180 bpm  Findings: Single live IUP, scb noted 14 x 7 x 14 mm     Maternal Structures:  Cervix: The cervix appears long and closed.  Right Ovary: Wnl  Left Ovary: Wnl  Technique:Transabdominal Imaging performed     Impression:   Early villalta viable intrauterine pregnancy with yolk sac and cardiac activity, measures 11w 5d by today's ultrasound, which is consistent with menstrual dating. Estimated Date of Delivery remains May 4, 2025 .  There is a subchorionic hemorrhage that measures 14 x 7 x 14mm.     Mariah Trujillo MD      Assessment:  Yaritza Dias is a 34 year old  at 11w2d presenting to establish prenatal care.    Problem List:   -Autoimmune hepatitis-stable on Imuran. Follows with GI and has MFM consult scheduled. Patient mentioned that her GI doc wanted her to get a EGD during pregnancy to rule out esophageal varices (last EGD was negative), typically would plan to this in the 2nd trimester if possible prior to 24 weeks,  will discuss with their team and coordination for fetal monitoring. She was also told to avoid flu/covid vaccines. Will discuss typical vaccines with pregnancy as well as frequency of labs with her GI team  -cHTN, not on medication: will get baseline pre-e labs today, discussed starting low dose ASA. BP low mild range today, recommend starting antihypertensive if elevated BP persists with goal to maintain BP <140/90 during pregnancy. Recommend starting low dose ASA for pre-e  prevention   -Hx of GDMA1, baseline A1c drawn today, if <6.5 will do 1hr GCT at 24-28 weeks   -Hx of 3rd degree perineal laceration, healed without issue. Discussed risk of recurrent OASIS but that the only way to avoid this would be primary CS. She feels comfortable with attempted  with this pregnancy   -Thrombocytopenia-platelet count ~90,000. Likely 2/2 splenomegaly. Will continue to monitor monthly throughout pregnancy and refer to heme if worsening.   -Daughter with pulmonary valve stenosis, will need fetal ECHO with this pregnancy     Plan:  Patient has MFM consult scheduled, will also discuss plan of care with her hepatologist   Reviewed routine prenatal care. Discussed MD call schedule as well as role of residents and med students both in clinic and hospital.  She is okay with resident care  Pap: done today    Diet, Nutrition and Exercise:  Continue PNVs. Continue normal exercise. Her prepregnancy BMI is 32.  According to the WHO guidelines, patient is given a goal of gaining approximately 11-20 pounds during the course of her pregnancy.    Immunizations: patient reports that she was told by her hepatologist to avoid Flu/COVID vaccines, will discuss plans for immunization during pregnancy (including Tdap) with her hepatologist.   Fetal anomaly screening: planning NIPT, genetics scheduled   Routine Prenatal Care: the patient will return to clinic in 4 weeks and jaclyn Alex MD

## 2024-10-28 ENCOUNTER — TELEPHONE (OUTPATIENT)
Dept: OBGYN | Facility: CLINIC | Age: 34
End: 2024-10-28
Payer: COMMERCIAL

## 2024-10-28 ENCOUNTER — TELEPHONE (OUTPATIENT)
Dept: GASTROENTEROLOGY | Facility: CLINIC | Age: 34
End: 2024-10-28
Payer: COMMERCIAL

## 2024-10-28 NOTE — TELEPHONE ENCOUNTER
Krystyna Wren MD  [facility] Triage  Could you please call patient and let her know that our MD group, Dr. Borden (), and Dr. Dodd ([facility]) discussed her case and all agree that she should be [facility] primary for her pregnancy.  I notice she is scheduled with [facility] for her next visit (not appropriate given her health history).  Please help her get coordinated with [facility] for her next prenatal visits.  Thanks,  Krystyna

## 2024-10-28 NOTE — TELEPHONE ENCOUNTER
"Endoscopy Scheduling Screen    Have you had any respiratory illness or flu-like symptoms in the last 10 days?  No    What is your communication preference for Instructions and/or Bowel Prep?   MyChart    What insurance is in the chart?  Other:  [facility]    Ordering/Referring Provider:     MIGUEL ANGEL ZHANG      (If ordering provider performs procedure, schedule with ordering provider unless otherwise instructed. )    BMI: Estimated body mass index is 34.02 kg/m  as calculated from the following:    Height as of 9/19/24: 1.626 m (5' 4\").    Weight as of 10/15/24: 89.9 kg (198 lb 3.2 oz).     Sedation Ordered  MAC/deep sedation.   BMI<= 45 45 < BMI <= 48 48 < BMI < = 50  BMI > 50   No Restrictions No [facility]  No [facility]  [facility] with exceptions Hospital Only OR Only       Do you have a history of malignant hyperthermia?  No    (Females) Are you currently pregnant?   Yes (RN Review required for scheduling.)12 WEEKS 5 DAYS     Have you been diagnosed or told you have pulmonary hypertension?   No    Do you have an LVAD?  No    Have you been told you have moderate to severe sleep apnea?  No.    Have you been told you have COPD, asthma, or any other lung disease?  No    Do you have any heart conditions?  No     Have you ever had or are you waiting for an organ transplant?  No. Continue scheduling, no site restrictions.    Have you had a stroke or transient ischemic attack (TIA aka \"mini stroke\" in the last 6 months?   No    Have you been diagnosed with or been told you have cirrhosis of the liver?   Yes. (RN Review required for scheduling unless scheduling in Hospital.)    Are you currently on dialysis?   No    Do you need assistance transferring?   No    BMI: Estimated body mass index is 34.02 kg/m  as calculated from the following:    Height as of 9/19/24: 1.626 m (5' 4\").    Weight as of 10/15/24: 89.9 kg (198 lb 3.2 oz).     Is patients BMI > 40 and scheduling location [facility]?  No    Do you take an injectable or oral medication " for weight loss or diabetes (excluding insulin)?  No    Do you take the medication Naltrexone?  No    Do you take blood thinners?  No       Prep   Are you currently on dialysis or do you have chronic kidney disease?  No    Do you have a diagnosis of diabetes?  No    Do you have a diagnosis of cystic fibrosis (CF)?  No    On a regular basis do you go 3 -5 days between bowel movements?      BMI > 40?      Preferred Pharmacy:    [id number] - [place], [place] - [address]  AT [place] [address]  [address]  [place] [address]  Phone: [phone] Fax: [phone]      Final Scheduling Details     Procedure scheduled  Upper endoscopy (EGD)    Surgeon:  VICENTE     Date of procedure: CAROL SENDING MESSAGE TO VICENTE FOR DATES AT [facility] WITH MAC       Pre-OP / PAC:   No - Not required for this site.    Location  [facility] - Per order.    Sedation   MAC/Deep Sedation - Per order.      Patient Reminders:   You will receive a call from a Nurse to review instructions and health history.  This assessment must be completed prior to your procedure.  Failure to complete the Nurse assessment may result in the procedure being cancelled.      On the day of your procedure, please designate an adult(s) who can drive you home stay with you for the next 24 hours. The medicines used in the exam will make you sleepy. You will not be able to drive.      You cannot take public transportation, ride share services, or non-medical taxi service without a responsible caregiver.  Medical transport services are allowed with the requirement that a responsible caregiver will receive you at your destination.  We require that drivers and caregivers are confirmed prior to your procedure.

## 2024-10-28 NOTE — TELEPHONE ENCOUNTER
Pregnancy Assessment     Patient states she is currently pregnant. Estimated gestational age is 13 weeks 1 day based on active pregnancy episode documentation in Epic. And 12 weeks 5 days per pt report.    Advised patient to discuss risks and benefits of colonoscopy with OB. and Dr. Borden ordered and aware and discussing with Ob/Gyn.     Gestational Age < 15 Weeks at Procedure Date Gestational Age >= 15 Weeks at Procedure Date   No Scheduling Restrictions Hospital Only        [facility] under MAC per order after 15 weeks. Also discuss benefits and risks with OB/GYN team.

## 2024-10-28 NOTE — TELEPHONE ENCOUNTER
Called patient and discussed plan for [facility] to be primary OB.  Patient expressed understanding.    Offered to transfer to [facility] to schedule, patient would rather call back.  [facility] phone number given to schedule.  Appt with us on 11/11 cancelled.    Shirin Witt RN

## 2024-10-29 ENCOUNTER — TELEPHONE (OUTPATIENT)
Dept: MATERNAL FETAL MEDICINE | Facility: CLINIC | Age: 34
End: 2024-10-29
Payer: COMMERCIAL

## 2024-10-29 DIAGNOSIS — O26.90 PREGNANCY RELATED CONDITION: Primary | ICD-10-CM

## 2024-10-29 NOTE — TELEPHONE ENCOUNTER
Endoscopy Scheduling Screen    Final Scheduling Details     Procedure scheduled  Upper endoscopy (EGD)    Surgeon:  TIRSO     Date of procedure:  11/07     Pre-OP / PAC:   No - Not required for this site.    Location  [facility] - Per order.    Sedation   MAC/Deep Sedation - Per order.      Patient Reminders:   You will receive a call from a Nurse to review instructions and health history.  This assessment must be completed prior to your procedure.  Failure to complete the Nurse assessment may result in the procedure being cancelled.      On the day of your procedure, please designate an adult(s) who can drive you home stay with you for the next 24 hours. The medicines used in the exam will make you sleepy. You will not be able to drive.      You cannot take public transportation, ride share services, or non-medical taxi service without a responsible caregiver.  Medical transport services are allowed with the requirement that a responsible caregiver will receive you at your destination.  We require that drivers and caregivers are confirmed prior to your procedure.

## 2024-10-29 NOTE — TELEPHONE ENCOUNTER
Writer called to discuss recommendation to transfer care to [facility] for primary OB care, patient noted she was notified of this yesterday as well. Patient verbalizes understanding and amenable. Patient reports she is currently working on dates for her upper EGD.  Orders placed for 2/3 complete and first OBV, soft transferred to scheduling to schedule appointments.

## 2024-10-29 NOTE — TELEPHONE ENCOUNTER
Called and LVM to offer 11/7 with [facility] MAC. Pt needs procedure in the next 2 weeks or ASAP. Please advise this to patient to see if she will take this date

## 2024-10-31 ENCOUNTER — TELEPHONE (OUTPATIENT)
Dept: MATERNAL FETAL MEDICINE | Facility: CLINIC | Age: 34
End: 2024-10-31
Payer: COMMERCIAL

## 2024-10-31 LAB — SCANNED LAB RESULT: NORMAL

## 2024-10-31 NOTE — TELEPHONE ENCOUNTER
October 31, 2024  Left a message for Yaritza regarding her Prequel  NIPT results.     Results indicate NO ANEUPLOIDY DETECTED for chromosomes 21, 18, 13, or sex chromosomes (XY). Predicted sex of baby was disclosed per patient request.     This puts her current pregnancy at low risk for Down syndrome, trisomy 18, trisomy 13 and sex chromosome abnormalities. This test is reported to have the following sensitivities: Down syndrome: 99.7%, trisomy 18: 97.9%, and trisomy 13: 99.0%. Although these results are reassuring, this does not replace a standard chromosome analysis from a chorionic villus sampling or amniocentesis.      MSAFP is the appropriate second trimester screening test for open neural tube defects; the maternal quad screen is not recommended.     Her results are available in her Epic chart for her primary OB to review.    This information was left in Yaritza's voicemail per plan established at her genetic counseling appointment, and Yaritza was encouraged to reach out if she has any questions or concerns.     She Ambrosio MS, [facility]  Licensed Genetic Counselor  [facility]  Maternal Fetal Medicine  Ph: [phone]  [email or web address]

## 2024-11-01 ENCOUNTER — TELEPHONE (OUTPATIENT)
Dept: GASTROENTEROLOGY | Facility: CLINIC | Age: 34
End: 2024-11-01
Payer: COMMERCIAL

## 2024-11-01 NOTE — TELEPHONE ENCOUNTER
Pre assessment completed for upcoming procedure.    Patient is pregnant - sent FYI to [facility] charge RN for coordination of fetal monitor during procedure.    Procedure details:    Patient scheduled for Upper endoscopy (EGD) on 11.7.24.     Arrival time: 0930. Procedure time 1100    Facility location: [facility]; [address] [place] [address], [address] Floor, [place] [address]. Check in location: Main entrance at registration desk.    Sedation type: MAC    Pre op exam needed? No.    Indication for procedure: Autoimmune hepatitis     Designated  policy reviewed. Instructed to have someone stay 24 hours post procedure.       Chart review:     Electronic implanted devices? No    Recent diagnosis of diverticulitis within the last 6 weeks?  N/A      Medication review:    Diabetic? No    Anticoagulants? No    Weight loss medication/injectable? No.    Other medication HOLDING recommendations:  N/A      Prep for procedure:     Prep instructions sent via [employer]     Reviewed procedure prep instructions.     Patient verbalized understanding and had no questions or concerns at this time.        Kaleigh Barlow RN  Endoscopy Procedure Pre Assessment   [phone] option 2

## 2024-11-07 ENCOUNTER — HOSPITAL ENCOUNTER (OUTPATIENT)
Facility: CLINIC | Age: 34
Discharge: HOME | End: 2024-11-07
Attending: INTERNAL MEDICINE | Admitting: INTERNAL MEDICINE
Payer: COMMERCIAL

## 2024-11-07 ENCOUNTER — ANESTHESIA EVENT (OUTPATIENT)
Dept: GASTROENTEROLOGY | Facility: CLINIC | Age: 34
End: 2024-11-07
Payer: COMMERCIAL

## 2024-11-07 ENCOUNTER — ANESTHESIA (OUTPATIENT)
Dept: GASTROENTEROLOGY | Facility: CLINIC | Age: 34
End: 2024-11-07
Payer: COMMERCIAL

## 2024-11-07 VITALS
SYSTOLIC BLOOD PRESSURE: 114 MMHG | HEART RATE: 103 BPM | DIASTOLIC BLOOD PRESSURE: 83 MMHG | RESPIRATION RATE: 16 BRPM | OXYGEN SATURATION: 100 %

## 2024-11-07 LAB — UPPER GI ENDOSCOPY: NORMAL

## 2024-11-07 PROCEDURE — 43235 EGD DIAGNOSTIC BRUSH WASH: CPT | Performed by: INTERNAL MEDICINE

## 2024-11-07 PROCEDURE — 250N000011 HC RX IP 250 OP 636: Performed by: NURSE ANESTHETIST, CERTIFIED REGISTERED

## 2024-11-07 PROCEDURE — 370N000017 HC ANESTHESIA TECHNICAL FEE, PER MIN: Performed by: INTERNAL MEDICINE

## 2024-11-07 PROCEDURE — 43235 EGD DIAGNOSTIC BRUSH WASH: CPT | Performed by: NURSE ANESTHETIST, CERTIFIED REGISTERED

## 2024-11-07 PROCEDURE — 43235 EGD DIAGNOSTIC BRUSH WASH: CPT | Performed by: STUDENT IN AN ORGANIZED HEALTH CARE EDUCATION/TRAINING PROGRAM

## 2024-11-07 PROCEDURE — 258N000003 HC RX IP 258 OP 636: Performed by: NURSE ANESTHETIST, CERTIFIED REGISTERED

## 2024-11-07 RX ORDER — ASPIRIN 81 MG/1
81 TABLET ORAL DAILY
COMMUNITY

## 2024-11-07 RX ORDER — OXYCODONE HYDROCHLORIDE 5 MG/1
5 TABLET ORAL
Status: CANCELLED | OUTPATIENT
Start: 2024-11-07

## 2024-11-07 RX ORDER — PROPOFOL 10 MG/ML
INJECTION, EMULSION INTRAVENOUS PRN
Status: DISCONTINUED | OUTPATIENT
Start: 2024-11-07 | End: 2024-11-07

## 2024-11-07 RX ORDER — NALOXONE HYDROCHLORIDE 0.4 MG/ML
0.2 INJECTION, SOLUTION INTRAMUSCULAR; INTRAVENOUS; SUBCUTANEOUS
Status: DISCONTINUED | OUTPATIENT
Start: 2024-11-07 | End: 2024-11-07 | Stop reason: HOSPADM

## 2024-11-07 RX ORDER — PROCHLORPERAZINE MALEATE 5 MG/1
10 TABLET ORAL EVERY 6 HOURS PRN
Status: DISCONTINUED | OUTPATIENT
Start: 2024-11-07 | End: 2024-11-07 | Stop reason: HOSPADM

## 2024-11-07 RX ORDER — LIDOCAINE 40 MG/G
CREAM TOPICAL
Status: DISCONTINUED | OUTPATIENT
Start: 2024-11-07 | End: 2024-11-07 | Stop reason: HOSPADM

## 2024-11-07 RX ORDER — ONDANSETRON 4 MG/1
4 TABLET, ORALLY DISINTEGRATING ORAL EVERY 6 HOURS PRN
Status: DISCONTINUED | OUTPATIENT
Start: 2024-11-07 | End: 2024-11-07 | Stop reason: HOSPADM

## 2024-11-07 RX ORDER — ONDANSETRON 2 MG/ML
4 INJECTION INTRAMUSCULAR; INTRAVENOUS EVERY 6 HOURS PRN
Status: DISCONTINUED | OUTPATIENT
Start: 2024-11-07 | End: 2024-11-07 | Stop reason: HOSPADM

## 2024-11-07 RX ORDER — SODIUM CHLORIDE, SODIUM LACTATE, POTASSIUM CHLORIDE, CALCIUM CHLORIDE 600; 310; 30; 20 MG/100ML; MG/100ML; MG/100ML; MG/100ML
INJECTION, SOLUTION INTRAVENOUS CONTINUOUS PRN
Status: DISCONTINUED | OUTPATIENT
Start: 2024-11-07 | End: 2024-11-07

## 2024-11-07 RX ORDER — NALOXONE HYDROCHLORIDE 0.4 MG/ML
0.1 INJECTION, SOLUTION INTRAMUSCULAR; INTRAVENOUS; SUBCUTANEOUS
Status: CANCELLED | OUTPATIENT
Start: 2024-11-07

## 2024-11-07 RX ORDER — ONDANSETRON 4 MG/1
4 TABLET, ORALLY DISINTEGRATING ORAL EVERY 30 MIN PRN
Status: CANCELLED | OUTPATIENT
Start: 2024-11-07

## 2024-11-07 RX ORDER — FLUMAZENIL 0.1 MG/ML
0.2 INJECTION, SOLUTION INTRAVENOUS
Status: DISCONTINUED | OUTPATIENT
Start: 2024-11-07 | End: 2024-11-07 | Stop reason: HOSPADM

## 2024-11-07 RX ORDER — DEXAMETHASONE SODIUM PHOSPHATE 4 MG/ML
4 INJECTION, SOLUTION INTRA-ARTICULAR; INTRALESIONAL; INTRAMUSCULAR; INTRAVENOUS; SOFT TISSUE
Status: CANCELLED | OUTPATIENT
Start: 2024-11-07

## 2024-11-07 RX ORDER — ONDANSETRON 2 MG/ML
4 INJECTION INTRAMUSCULAR; INTRAVENOUS EVERY 30 MIN PRN
Status: CANCELLED | OUTPATIENT
Start: 2024-11-07

## 2024-11-07 RX ORDER — PROPOFOL 10 MG/ML
INJECTION, EMULSION INTRAVENOUS CONTINUOUS PRN
Status: DISCONTINUED | OUTPATIENT
Start: 2024-11-07 | End: 2024-11-07

## 2024-11-07 RX ORDER — OXYCODONE HYDROCHLORIDE 10 MG/1
10 TABLET ORAL
Status: CANCELLED | OUTPATIENT
Start: 2024-11-07

## 2024-11-07 RX ORDER — NALOXONE HYDROCHLORIDE 0.4 MG/ML
0.4 INJECTION, SOLUTION INTRAMUSCULAR; INTRAVENOUS; SUBCUTANEOUS
Status: DISCONTINUED | OUTPATIENT
Start: 2024-11-07 | End: 2024-11-07 | Stop reason: HOSPADM

## 2024-11-07 RX ORDER — ONDANSETRON 2 MG/ML
4 INJECTION INTRAMUSCULAR; INTRAVENOUS
Status: DISCONTINUED | OUTPATIENT
Start: 2024-11-07 | End: 2024-11-07 | Stop reason: HOSPADM

## 2024-11-07 RX ADMIN — PROPOFOL 200 MCG/KG/MIN: 10 INJECTION, EMULSION INTRAVENOUS at 11:01

## 2024-11-07 RX ADMIN — PROPOFOL 30 MG: 10 INJECTION, EMULSION INTRAVENOUS at 11:03

## 2024-11-07 RX ADMIN — SODIUM CHLORIDE, POTASSIUM CHLORIDE, SODIUM LACTATE AND CALCIUM CHLORIDE: 600; 310; 30; 20 INJECTION, SOLUTION INTRAVENOUS at 10:56

## 2024-11-07 RX ADMIN — PROPOFOL 30 MG: 10 INJECTION, EMULSION INTRAVENOUS at 11:04

## 2024-11-07 RX ADMIN — PROPOFOL 30 MG: 10 INJECTION, EMULSION INTRAVENOUS at 11:01

## 2024-11-07 ASSESSMENT — ACTIVITIES OF DAILY LIVING (ADL)
ADLS_ACUITY_SCORE: 0

## 2024-11-07 NOTE — ANESTHESIA PREPROCEDURE EVALUATION
Anesthesia Pre-Procedure Evaluation    Patient: Yaritza Dias   MRN: [id number] : 1990        Procedure : Procedure(s):  Esophagoscopy, gastroscopy, duodenoscopy (EGD), combined          Past Medical History:   Diagnosis Date    Autoimmune hepatitis (H)     Gestational diabetes     Kidney stone     Motion sickness       Past Surgical History:   Procedure Laterality Date    COMBINED CYSTOSCOPY, RETROGRADES, URETEROSCOPY, LASER HOLMIUM LITHOTRIPSY URETER(S), INSERT STENT Right 2024    Procedure: CYSTOURETEROSCOPY, WITH RETROGRADE PYELOGRAM, LASER LITHOTRIPSY OF URETERAL CALCULUS,STONE BASKSET RETRIVAL AND STENT INSERTION;  Surgeon: Naldo Neil MD;  Location: [facility] OR    ESOPHAGOSCOPY, GASTROSCOPY, DUODENOSCOPY (EGD), COMBINED N/A 2022    Procedure: ESOPHAGOGASTRODUODENOSCOPY (EGD);  Surgeon: Ashlee Borden MD;  Location: [facility] OR    IR LIVER BIOPSY PERCUTANEOUS  3/18/2022      Allergies   Allergen Reactions    Penicillins GI Disturbance      Social History     Tobacco Use    Smoking status: Never     Passive exposure: Never    Smokeless tobacco: Never   Substance Use Topics    Alcohol use: Not Currently      Wt Readings from Last 1 Encounters:   10/15/24 89.9 kg (198 lb 3.2 oz)        Anesthesia Evaluation   Pt has had prior anesthetic. Type: MAC.    No history of anesthetic complications       ROS/MED HX  ENT/Pulmonary:  - neg pulmonary ROS     Neurologic:  - neg neurologic ROS     Cardiovascular:  - neg cardiovascular ROS     METS/Exercise Tolerance: >4 METS    Hematologic:  - neg hematologic  ROS     Musculoskeletal:  - neg musculoskeletal ROS     GI/Hepatic:     (+)           hepatitis (Autoimmune)  liver disease,       Renal/Genitourinary:       Endo:  - neg endo ROS     Psychiatric/Substance Use:     (+) psychiatric history anxiety       Infectious Disease:  - neg infectious disease ROS     Malignancy:       Other:            Physical Exam    Airway        Mallampati: III    TM distance: > 3 FB   Neck ROM: full   Mouth opening: > 3 cm    Respiratory Devices and Support         Dental       (+) Minor Abnormalities - some fillings, tiny chips      Cardiovascular          Rhythm and rate: regular and normal     Pulmonary                   OUTSIDE LABS:  CBC:   Lab Results   Component Value Date    WBC 5.1 10/15/2024    WBC 5.4 09/30/2024    HGB 13.2 10/15/2024    HGB 13.0 09/30/2024    HCT 39.8 10/15/2024    HCT 38.8 09/30/2024     (L) 10/15/2024     (L) 09/30/2024     BMP:   Lab Results   Component Value Date     09/30/2024     06/17/2024    POTASSIUM 4.2 09/30/2024    POTASSIUM 4.0 06/17/2024    CHLORIDE 106 09/30/2024    CHLORIDE 106 06/17/2024    CO2 22 09/30/2024    CO2 24 06/17/2024    BUN 9.4 09/30/2024    BUN 13.8 06/17/2024    CR 0.59 10/15/2024    CR 0.61 09/30/2024     (H) 09/30/2024     (H) 06/17/2024     COAGS:   Lab Results   Component Value Date    PTT 36 07/23/2021    INR 1.12 09/30/2024    FIBR 146 (L) 07/23/2021     POC:   Lab Results   Component Value Date    HCG Negative 06/17/2024     HEPATIC:   Lab Results   Component Value Date    ALBUMIN 4.1 09/30/2024    PROTTOTAL 6.5 09/30/2024    ALT 13 09/30/2024    AST 19 09/30/2024    ALKPHOS 39 (L) 09/30/2024    BILITOTAL 1.0 09/30/2024    JILL <10 (L) 07/26/2021     OTHER:   Lab Results   Component Value Date    LACT 1.5 07/25/2021    A1C 4.9 10/15/2024    KENDY 9.2 09/30/2024    PHOS 3.6 07/26/2021    MAG 1.8 06/17/2024    LIPASE 42 02/23/2024    AMYLASE 41 02/23/2024    TSH 0.17 (L) 07/23/2021    T4 1.06 07/23/2021    CRP 5.7 07/25/2021    SED 13 02/23/2024       Anesthesia Plan    ASA Status:  2    NPO Status:  NPO Appropriate    Anesthesia Type: MAC.     - Reason for MAC: straight local not clinically adequate              Consents    Anesthesia Plan(s) and associated risks, benefits, and realistic alternatives discussed. Questions answered and patient/representative(s) expressed  understanding.     - Discussed: Risks, Benefits and Alternatives for BOTH SEDATION and the PROCEDURE were discussed     - Discussed with:  Spouse, Patient      - Extended Intubation/Ventilatory Support Discussed: No.      - Patient is DNR/DNI Status: No     Use of blood products discussed: No .     Postoperative Care    Pain management: IV analgesics, Oral pain medications.        Comments:               Josh Gardner MD    I have reviewed the pertinent notes and labs in the chart from the past 30 days and (re)examined the patient.  Any updates or changes from those notes are reflected in this note.             # Drug Induced Platelet Defect: home medication list includes an antiplatelet medication   # Hypertension: Noted on problem list

## 2024-11-07 NOTE — ANESTHESIA CARE TRANSFER NOTE
Patient: Yaritza Dias    Procedure: Procedure(s):  Esophagoscopy, gastroscopy, duodenoscopy (EGD), combined       Diagnosis: Autoimmune hepatitis (H) [K75.4]  Diagnosis Additional Information: No value filed.    Anesthesia Type:   MAC     Note:    Oropharynx: oropharynx clear of all foreign objects and spontaneously breathing  Level of Consciousness: awake  Oxygen Supplementation: room air    Independent Airway: airway patency satisfactory and stable  Dentition: dentition unchanged  Vital Signs Stable: post-procedure vital signs reviewed and stable  Report to RN Given: handoff report given  Patient transferred to: Phase II  Comments: IV/airway patent, pt alert, denies pain, resting on left side.  Handoff Report: Identifed the Patient, Identified the Reponsible Provider, Reviewed the pertinent medical history, Discussed the surgical course, Reviewed Intra-OP anesthesia mangement and issues during anesthesia, Set expectations for post-procedure period and Allowed opportunity for questions and acknowledgement of understanding      Vitals:  Vitals Value Taken Time   /79    Temp 36.9    Pulse 114    Resp 15    SpO2 98        Electronically Signed By: CATRACHITO Warner CRNA  November 7, 2024  11:18 AM

## 2024-11-09 NOTE — ANESTHESIA POSTPROCEDURE EVALUATION
Patient: Yaritza Dias    Procedure: Procedure(s):  Esophagoscopy, gastroscopy, duodenoscopy (EGD), combined       Anesthesia Type:  MAC    Note:  Disposition: Outpatient   Postop Pain Control: Uneventful            Sign Out: Well controlled pain   PONV: No   Neuro/Psych: Uneventful            Sign Out: Acceptable/Baseline neuro status   Airway/Respiratory: Uneventful            Sign Out: Acceptable/Baseline resp. status   CV/Hemodynamics: Uneventful            Sign Out: Acceptable CV status; No obvious hypovolemia; No obvious fluid overload   Other NRE: NONE   DID A NON-ROUTINE EVENT OCCUR? No           Last vitals:  Vitals Value Taken Time   /83 11/07/24 1140   Temp     Pulse 103 11/07/24 1120   Resp 16 11/07/24 1120   SpO2 100 % 11/07/24 1142   Vitals shown include unfiled device data.

## 2024-11-21 ENCOUNTER — TELEPHONE (OUTPATIENT)
Dept: GASTROENTEROLOGY | Facility: CLINIC | Age: 34
End: 2024-11-21
Payer: COMMERCIAL

## 2024-11-21 NOTE — TELEPHONE ENCOUNTER
"Left Voicemail (1st Attempt) for the patient to call back and schedule the following:    Appointment Type: Return Liver  Provider: Dr. Ashlee Borden  Appt date: Approx. Next available  Specialty phone number: [phone]  Additional appointment(s) needed: Lab  Additional Notes:   manually schedule Ret Liver Virtual visit into \"Friday virtual rescheds, per Mariana VILLA email, 8/26/24 kns\" held slots on Mondays in January   "

## 2024-11-22 ENCOUNTER — LAB (OUTPATIENT)
Dept: LAB | Facility: CLINIC | Age: 34
End: 2024-11-22
Attending: OBSTETRICS & GYNECOLOGY
Payer: COMMERCIAL

## 2024-11-22 ENCOUNTER — HOSPITAL ENCOUNTER (OUTPATIENT)
Dept: ULTRASOUND IMAGING | Facility: CLINIC | Age: 34
Discharge: HOME | End: 2024-11-22
Attending: OBSTETRICS & GYNECOLOGY
Payer: COMMERCIAL

## 2024-11-22 DIAGNOSIS — D69.6 THROMBOCYTOPENIA (H): ICD-10-CM

## 2024-11-22 DIAGNOSIS — O26.90 PREGNANCY RELATED CONDITION: ICD-10-CM

## 2024-11-22 DIAGNOSIS — R00.0 TACHYCARDIA: ICD-10-CM

## 2024-11-22 DIAGNOSIS — I10 HTN (HYPERTENSION): ICD-10-CM

## 2024-11-22 LAB
ALBUMIN SERPL BCG-MCNC: 3.5 G/DL (ref 3.5–5.2)
ALP SERPL-CCNC: 40 U/L (ref 40–150)
ALT SERPL W P-5'-P-CCNC: 8 U/L (ref 0–50)
ANION GAP SERPL CALCULATED.3IONS-SCNC: 10 MMOL/L (ref 7–15)
AST SERPL W P-5'-P-CCNC: 14 U/L (ref 0–45)
BILIRUB SERPL-MCNC: 0.5 MG/DL
BUN SERPL-MCNC: 6.1 MG/DL (ref 6–20)
CALCIUM SERPL-MCNC: 9 MG/DL (ref 8.8–10.4)
CHLORIDE SERPL-SCNC: 105 MMOL/L (ref 98–107)
CREAT SERPL-MCNC: 0.54 MG/DL (ref 0.51–0.95)
EGFRCR SERPLBLD CKD-EPI 2021: >90 ML/MIN/1.73M2
ERYTHROCYTE [DISTWIDTH] IN BLOOD BY AUTOMATED COUNT: 13.9 % (ref 10–15)
GLUCOSE SERPL-MCNC: 122 MG/DL (ref 70–99)
HCO3 SERPL-SCNC: 22 MMOL/L (ref 22–29)
HCT VFR BLD AUTO: 35.7 % (ref 35–47)
HGB BLD-MCNC: 12.2 G/DL (ref 11.7–15.7)
MCH RBC QN AUTO: 28.7 PG (ref 26.5–33)
MCHC RBC AUTO-ENTMCNC: 34.2 G/DL (ref 31.5–36.5)
MCV RBC AUTO: 84 FL (ref 78–100)
PLATELET # BLD AUTO: 107 10E3/UL (ref 150–450)
POTASSIUM SERPL-SCNC: 3.9 MMOL/L (ref 3.4–5.3)
PROT SERPL-MCNC: 6 G/DL (ref 6.4–8.3)
RBC # BLD AUTO: 4.25 10E6/UL (ref 3.8–5.2)
SODIUM SERPL-SCNC: 137 MMOL/L (ref 135–145)
TSH SERPL DL<=0.005 MIU/L-ACNC: 0.56 UIU/ML (ref 0.3–4.2)
WBC # BLD AUTO: 6.4 10E3/UL (ref 4–11)

## 2024-11-22 PROCEDURE — 76805 OB US >/= 14 WKS SNGL FETUS: CPT | Mod: 26 | Performed by: OBSTETRICS & GYNECOLOGY

## 2024-11-22 PROCEDURE — 80053 COMPREHEN METABOLIC PANEL: CPT

## 2024-11-22 PROCEDURE — 76805 OB US >/= 14 WKS SNGL FETUS: CPT

## 2024-11-22 PROCEDURE — 85041 AUTOMATED RBC COUNT: CPT

## 2024-11-22 PROCEDURE — 84443 ASSAY THYROID STIM HORMONE: CPT

## 2024-11-22 PROCEDURE — 85014 HEMATOCRIT: CPT

## 2024-11-22 PROCEDURE — 36415 COLL VENOUS BLD VENIPUNCTURE: CPT

## 2024-11-26 ENCOUNTER — TELEPHONE (OUTPATIENT)
Dept: MATERNAL FETAL MEDICINE | Facility: CLINIC | Age: 34
End: 2024-11-26
Payer: COMMERCIAL

## 2024-11-26 NOTE — TELEPHONE ENCOUNTER
Patient called to give her blood pressure & HR readings over the past few days:    Saturday 11/23- /86  HR 94  Sunday 11/24- /81 HR 94  Monday 11/25- /81 HR 93  Tuesday 11/26- /78 HR 90

## 2024-12-13 ENCOUNTER — APPOINTMENT (OUTPATIENT)
Dept: LAB | Facility: CLINIC | Age: 34
End: 2024-12-13
Attending: OBSTETRICS & GYNECOLOGY
Payer: COMMERCIAL

## 2024-12-13 ENCOUNTER — HOSPITAL ENCOUNTER (OUTPATIENT)
Dept: ULTRASOUND IMAGING | Facility: CLINIC | Age: 34
Discharge: HOME | End: 2024-12-13
Attending: OBSTETRICS & GYNECOLOGY
Payer: COMMERCIAL

## 2024-12-13 DIAGNOSIS — O26.92 PREGNANCY RELATED CONDITION IN SECOND TRIMESTER: ICD-10-CM

## 2024-12-13 DIAGNOSIS — R00.0 TACHYCARDIA: ICD-10-CM

## 2024-12-13 DIAGNOSIS — I15.9 SECONDARY HYPERTENSION: ICD-10-CM

## 2024-12-13 DIAGNOSIS — D69.6 THROMBOCYTOPENIA (H): ICD-10-CM

## 2024-12-13 PROCEDURE — 76811 OB US DETAILED SNGL FETUS: CPT

## 2024-12-13 PROCEDURE — 76811 OB US DETAILED SNGL FETUS: CPT | Mod: 26 | Performed by: OBSTETRICS & GYNECOLOGY

## 2024-12-13 PROCEDURE — 84156 ASSAY OF PROTEIN URINE: CPT | Performed by: OBSTETRICS & GYNECOLOGY

## 2024-12-14 ENCOUNTER — HEALTH MAINTENANCE LETTER (OUTPATIENT)
Age: 34
End: 2024-12-14

## 2024-12-26 DIAGNOSIS — K75.4 AUTOIMMUNE HEPATITIS (H): Primary | ICD-10-CM

## 2025-01-03 ENCOUNTER — HOSPITAL ENCOUNTER (OUTPATIENT)
Dept: ULTRASOUND IMAGING | Facility: CLINIC | Age: 35
Discharge: HOME | End: 2025-01-03
Attending: STUDENT IN AN ORGANIZED HEALTH CARE EDUCATION/TRAINING PROGRAM
Payer: COMMERCIAL

## 2025-01-03 ENCOUNTER — HOSPITAL ENCOUNTER (OUTPATIENT)
Dept: CARDIOLOGY | Facility: CLINIC | Age: 35
Discharge: HOME | End: 2025-01-03
Attending: ADVANCED PRACTICE MIDWIFE
Payer: COMMERCIAL

## 2025-01-03 ENCOUNTER — LAB (OUTPATIENT)
Dept: LAB | Facility: CLINIC | Age: 35
End: 2025-01-03
Attending: ADVANCED PRACTICE MIDWIFE
Payer: COMMERCIAL

## 2025-01-03 ENCOUNTER — OFFICE VISIT (OUTPATIENT)
Dept: MATERNAL FETAL MEDICINE | Facility: CLINIC | Age: 35
End: 2025-01-03
Attending: STUDENT IN AN ORGANIZED HEALTH CARE EDUCATION/TRAINING PROGRAM
Payer: COMMERCIAL

## 2025-01-03 VITALS
OXYGEN SATURATION: 99 % | SYSTOLIC BLOOD PRESSURE: 138 MMHG | DIASTOLIC BLOOD PRESSURE: 77 MMHG | BODY MASS INDEX: 35.07 KG/M2 | WEIGHT: 204.3 LBS | HEART RATE: 136 BPM | RESPIRATION RATE: 18 BRPM

## 2025-01-03 DIAGNOSIS — K75.4 AUTOIMMUNE HEPATITIS (H): ICD-10-CM

## 2025-01-03 DIAGNOSIS — R00.0 TACHYCARDIA: ICD-10-CM

## 2025-01-03 DIAGNOSIS — O10.919 CHRONIC HYPERTENSION IN PREGNANCY: ICD-10-CM

## 2025-01-03 DIAGNOSIS — Z34.92 PRENATAL CARE IN SECOND TRIMESTER: ICD-10-CM

## 2025-01-03 DIAGNOSIS — O26.92 PREGNANCY RELATED CONDITION IN SECOND TRIMESTER: ICD-10-CM

## 2025-01-03 DIAGNOSIS — O09.529 SUPERVISION OF HIGH-RISK PREGNANCY OF ELDERLY MULTIGRAVIDA: Primary | ICD-10-CM

## 2025-01-03 DIAGNOSIS — D69.6 THROMBOCYTOPENIA: ICD-10-CM

## 2025-01-03 LAB
ALBUMIN SERPL BCG-MCNC: 3.5 G/DL (ref 3.5–5.2)
ALP SERPL-CCNC: 50 U/L (ref 40–150)
ALT SERPL W P-5'-P-CCNC: 8 U/L (ref 0–50)
ANION GAP SERPL CALCULATED.3IONS-SCNC: 12 MMOL/L (ref 7–15)
AST SERPL W P-5'-P-CCNC: 14 U/L (ref 0–45)
BILIRUB DIRECT SERPL-MCNC: <0.2 MG/DL (ref 0–0.3)
BILIRUB SERPL-MCNC: 0.4 MG/DL
BUN SERPL-MCNC: 6.5 MG/DL (ref 6–20)
CALCIUM SERPL-MCNC: 9.2 MG/DL (ref 8.8–10.4)
CHLORIDE SERPL-SCNC: 104 MMOL/L (ref 98–107)
CREAT SERPL-MCNC: 0.56 MG/DL (ref 0.51–0.95)
EGFRCR SERPLBLD CKD-EPI 2021: >90 ML/MIN/1.73M2
ERYTHROCYTE [DISTWIDTH] IN BLOOD BY AUTOMATED COUNT: 14.4 % (ref 10–15)
GLUCOSE SERPL-MCNC: 83 MG/DL (ref 70–99)
HCO3 SERPL-SCNC: 22 MMOL/L (ref 22–29)
HCT VFR BLD AUTO: 33.6 % (ref 35–47)
HGB BLD-MCNC: 11.3 G/DL (ref 11.7–15.7)
INR PPP: 1.04 (ref 0.85–1.15)
MCH RBC QN AUTO: 27.8 PG (ref 26.5–33)
MCHC RBC AUTO-ENTMCNC: 33.6 G/DL (ref 31.5–36.5)
MCV RBC AUTO: 83 FL (ref 78–100)
PLATELET # BLD AUTO: 119 10E3/UL (ref 150–450)
POTASSIUM SERPL-SCNC: 4 MMOL/L (ref 3.4–5.3)
PROT SERPL-MCNC: 6.3 G/DL (ref 6.4–8.3)
RBC # BLD AUTO: 4.06 10E6/UL (ref 3.8–5.2)
SODIUM SERPL-SCNC: 138 MMOL/L (ref 135–145)
WBC # BLD AUTO: 6.5 10E3/UL (ref 4–11)

## 2025-01-03 PROCEDURE — 99214 OFFICE O/P EST MOD 30 MIN: CPT | Mod: 25 | Performed by: MIDWIFE

## 2025-01-03 PROCEDURE — 76825 ECHO EXAM OF FETAL HEART: CPT | Mod: 26 | Performed by: PEDIATRICS

## 2025-01-03 PROCEDURE — 36415 COLL VENOUS BLD VENIPUNCTURE: CPT

## 2025-01-03 PROCEDURE — 80053 COMPREHEN METABOLIC PANEL: CPT

## 2025-01-03 PROCEDURE — 93325 DOPPLER ECHO COLOR FLOW MAPG: CPT

## 2025-01-03 PROCEDURE — 76827 ECHO EXAM OF FETAL HEART: CPT

## 2025-01-03 PROCEDURE — 76827 ECHO EXAM OF FETAL HEART: CPT | Mod: 26 | Performed by: PEDIATRICS

## 2025-01-03 PROCEDURE — 93325 DOPPLER ECHO COLOR FLOW MAPG: CPT | Mod: 26 | Performed by: PEDIATRICS

## 2025-01-03 PROCEDURE — 76816 OB US FOLLOW-UP PER FETUS: CPT | Mod: 26 | Performed by: STUDENT IN AN ORGANIZED HEALTH CARE EDUCATION/TRAINING PROGRAM

## 2025-01-03 PROCEDURE — 82248 BILIRUBIN DIRECT: CPT

## 2025-01-03 PROCEDURE — 76816 OB US FOLLOW-UP PER FETUS: CPT

## 2025-01-03 PROCEDURE — 85610 PROTHROMBIN TIME: CPT

## 2025-01-03 PROCEDURE — 80048 BASIC METABOLIC PNL TOTAL CA: CPT

## 2025-01-03 PROCEDURE — 99215 OFFICE O/P EST HI 40 MIN: CPT | Mod: 25 | Performed by: MIDWIFE

## 2025-01-03 PROCEDURE — 85027 COMPLETE CBC AUTOMATED: CPT

## 2025-01-03 NOTE — PROGRESS NOTES
"Yaritza Pepe  : 1990  MRN: [id number]     Subjective         Yaritza is here with  for ROBV at 22 6/7 weeks.  She reports fetal movement and denies uterine contractions, cramping, vaginal discharge changes or vaginal bleeding.  Doing well overall. Denies any cardiac symptoms.       Blood pressure noted to be elevated initially today, she reports blood pressure readings at home are all normotensive 110's/70's range.  Denies HA, visual changes, epigastric pain.  Denies SOB, palpitations, arrhythmias.  Denies new onset of edema, TWG 6 lbs thus far.  Teary with discussion about elevated blood pressure and possible white coat HTN.  Continues to check blood pressure at home once daily before bed.  She is frustrated that the blood pressure elevations seem like \"a crisis\" when she is here, but at home are completely normal.  Pulse is normal at home.  She has virtual GI visit today and will have labs collected including CBC with plts, BMP, Hepatic panel, INR.  She will inquire with GI regarding Tdap and RSV vaccines.  She has fetal ECHO scheduled today, will return to clinic for comprehensive ultrasound.  BP will be rechecked after return from ECHO and plan for follow up to be made.    Advised return for ROBV at 24 weeks for 1 hour GCT, CBC.  Yaritza is in agreement with plan.  Discussed follow up ultrasounds including anatomy US today, growth US every 4 weeks beginning at 28 weeks, and weekly BPP's beginning at 32 weeks.                            OB History    Para Term  AB Living   2 1 1 0 0 1   SAB IAB Ectopic Multiple Live Births      0 0 0 0 1          # Outcome Date GA Lbr Eric/2nd Weight Sex Type Anes PTL Lv   2 Current                     1 Term 21 38w6d 05:53 / 02:33 2.91 kg (6 lb 6.7 oz) F Vag-Spont   N MIKEL      Name: SNEHA PEPE      Apgar1: 8  Apgar5: 9      Current Facility-Administered Medications          Current Outpatient Medications   Medication Sig Dispense " "Refill    aspirin 81 MG EC tablet Take 81 mg by mouth daily.        azaTHIOprine (IMURAN) 50 MG tablet Take 1 tablet (50 mg) by mouth daily. 90 tablet 1    Prenatal Vit-DSS-Fe Cbn-FA (PRENATAL AD PO) Take by mouth.          No current facility-administered medications for this visit.            Objective     BP (!) 144/97 (BP Location: Left arm, Patient Position: Sitting, Cuff Size: Adult Regular)   Pulse (!) 136   Resp 18   Wt 92.7 kg (204 lb 4.8 oz)   LMP 07/28/2024   SpO2 99%   BMI 35.07 kg/m         Gen:NAD  CV:RRR  Resp:CTAB  Abd:Gravid, non-tender  Ext:No edema     Ultrasound   See today's ultrasound report under the \"imaging\" tab.     [facility] US COMPREHENSIVE SINGLE 12/13/2024  GA 19w5d. EFW 348g (79%). AC 69%. MVP 5.2 cm. Right urinary tract dilation A1 and marginal cord insertion. Suboptimal anatomy (RVOT, 3VT, Aortic arch, lips)     [facility] US OB COMPLETE 2/3 TRI SINGLE 11/22/2024  GA 16w5d. EFW 170g (48%). AC 67%. MVP 3.4 cm. Normal suboptimal anatomy. Ductal arch, aortic arch and bicaval cardiac views poorly visualized.      [facility] NUCHAL TRANSLUCENCY 10/22/2024   GA 12w5d. The nuchal translucency measurement is within the normal range.  The nasal bone was visualized. Visualized anatomy appropriate for early gestational age.        Other Imaging      MRI ABDOMEN 2/19/2022  1. Lobulated nodular liver parenchyma with capsular retraction and nodular areas of hypoenhancement, grossly stable from 7/3/2021 with no convincing acute MRI findings on the present study.  2. Splenomegaly. No ascites.  3. Additional incidental findings as described above.     MR LIVER 06/2023  1.  Similar appearance of the liver with a lobulated and nodular contour with areas of marked atrophy. No suspicious liver mass.  2.  Splenomegaly.     US ABDOMEN 12/4/2023   Cirrhotic appearing liver  No liver mass  Splenomegaly measuring 18 cm  No ascites     ECHOCARDIOGRAM COMPLETE 7/2021  Interpretation Summary  Global and regional left " ventricular function is normal with an EF of 60-65%.  Right ventricular function, chamber size, wall motion, and thickness are  normal.  Pulmonary artery systolic pressure is normal.  The inferior vena cava is normal.  No pericardial effusion is present.  There is no prior study for direct comparison         Procedures      UPPER GI ENDOSCOPY 11/7/2024  FINDINGS:  The examined esophagus was normal.   There is no endoscopic evidence of varices in the entire esophagus.   The entire examined stomach was normal.   There is no endoscopic evidence of varices or portal hypertension gastropathy in the entire examined stomach.   The examined duodenum was normal.   IMPRESSIONS:         Normal esophagus.   Normal stomach.   Normal examined duodenum.   No specimens collected.   RECOMMENDATIONS:  Discharge patient to home.   Resume previous diet.   Return to liver clinic.         Labs      Component      Latest Ref Rng 11/22/2024  10:27 AM   Sodium      135 - 145 mmol/L 137    Potassium      3.4 - 5.3 mmol/L 3.9    Carbon Dioxide (CO2)      22 - 29 mmol/L 22    Anion Gap      7 - 15 mmol/L 10    Urea Nitrogen      6.0 - 20.0 mg/dL 6.1    Creatinine      0.51 - 0.95 mg/dL 0.54    GFR Estimate      >60 mL/min/1.73m2 >90    Calcium      8.8 - 10.4 mg/dL 9.0    Chloride      98 - 107 mmol/L 105    Glucose      70 - 99 mg/dL 122 (H)    Alkaline Phosphatase      40 - 150 U/L 40    AST      0 - 45 U/L 14    ALT      0 - 50 U/L 8    Protein Total      6.4 - 8.3 g/dL 6.0 (L)    Albumin      3.5 - 5.2 g/dL 3.5    Bilirubin Total      <=1.2 mg/dL 0.5    WBC      4.0 - 11.0 10e3/uL 6.4    RBC Count      3.80 - 5.20 10e6/uL 4.25    Hemoglobin      11.7 - 15.7 g/dL 12.2    Hematocrit      35.0 - 47.0 % 35.7    MCV      78 - 100 fL 84    MCH      26.5 - 33.0 pg 28.7    MCHC      31.5 - 36.5 g/dL 34.2    RDW      10.0 - 15.0 % 13.9    Platelet Count      150 - 450 10e3/uL 107 (L)    TSH      0.30 - 4.20 uIU/mL 0.56       Legend:  (H) High  (L)  Low      Assessment/Plan      Yaritza Dias is a 34 year old  at 22w5d by LMP, confirmed by US, here for follow-up OB visit.     Pregnancy complicated by:   Maternal Dx  - Autoimmune hepatitis with necrosis and resultant advanced fibrosis, no evidence of portal hypertension  - Splenomegaly with thrombocytopenia  - Chronic hypertension  - Previous pregnancy affected by fetal pulmonary valve stenosis requiring  balloon angioplasty.      Autoimmune Hepatitis (diagnosed in 2021)   Splenomegaly with Thrombocytopenia   Presented in 2021 with abnormal LFTs (  TBR 12.8 DBR 7.6 INR 1.6.) MRI with multiple liver cysts. 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 Started on Prednisone with Azathioprine added. No evidence of portal hypertension or esophageal varices on EGD 24   - Stable on azathioprine 50 mg daily; last flare over a year ago  - Last visit with Dr. Borden 2023; has a follow-up appt scheduled with Dr. Borden on 2025  - Persistent thrombocytopenia attributed to azathioprine and splenomegaly; last Plt level 107K as of 2024. Redrawn today.  - Review of previous documentation from Dr. Borden on 2023 mentioned screening for splenic artery aneurysm prior to getting pregnant; previous imaging reviewed at their tumor board - sufficient without concern, no further imaging indicated   - Recommend ongoing lab evaluation monthly (CBC, CMP, Coags) to monitor liver function and platelets, completed today.     Chronic hypertension   - remains asymptomatic; not currently on antihypertensive medication   - suspected white coat hypertension, Bps > 140/90 in clinic  on initial check with normalization by end of visit; monitoring Bps at home with home blood pressure cuff. All Bps at goal and < 140/90 with normal HR. Continue home monitoring.  - Reviewed warning signs/symptoms of preeclampsia   - on low dose daily aspirin for preeclampsia  prevention  - Goal BP <140/90; reviewed that if antihypertensive medications are to be initiated for elevated BPs, then would need to first rule out superimposed preeclampsia by getting preeclampsia labs  - Instructed to call with any elevations >140/90 to initiate PO antihypertensive medication; preferably labetalol given her tachycardia      Tachycardia  - asymptomatic; HR improves at end of office visits; home heart rates in the 80s-90s  - TSH collected on  normal  - possible association with anxiety in the clinic setting      Fetal urinary tract dilation A1  - reviewed ultrasound findings with patient   - will continue to assess with ongoing ultrasound surveillance  - if persistent urinary tract dilation at 32 weeks (>7mm), then will place referral for pediatric urology     Marginal cord insertion  - reviewed ultrasound finding with patient  - will continue serial fetal growth ultrasound assessments      Previous pregnancy affected by fetal congenital heart anomaly   - Fetal Echocardiogram completed today      Surveillance  - Initiate serial growth US every 4 weeks starting at 28 weeks  - Weekly BPP starting at 32 weeks      Delivery planning  - Anesthesia consult in third trimester  - Delivery timing to be discussed with patient as pregnancy progresses   - Feeding: discuss at future visit  - Contraception plans: discuss at future visit     Routine PNC   - Prenatal labs 10/15/2024:  Rh pos (B pos), antibody negative  Hgb 13.2 MCV 87 Plt 115K               HepBAg/HIV/RPR/HepCAb: negative               GC/CT: negative               Rubella: immune                 UC: 10,000-50,000 CFU/mL Mixture of urogenital danna               Hgb A1c 4.9%               Creatinine 0.59 UPC 0.12               AST 19 ALT 13 (2024)  - Pap smear: NILM, HPV neg  (10/15/2024)  - Immunizations: Influenza vaccine offered today, Tdap at 28 weeks  - Aneuploidy screening: low risk NIPT  (10/22/2024)  - 1 hour GCT and CBC  due at 24 weeks.  She may consider 24 week labs at her primary OB clinic which is closer to home, aware she can have labs completed at this office at 24 weeks if desired.    - GBS at 36 weeks       CATRACHITO Darling, BASSEM  30 minutes on the date of the encounter doing chart review, review of outside records, review of test results, patient visit, and documentation

## 2025-01-03 NOTE — PATIENT INSTRUCTIONS
"Weeks 22 to 26 of Your Pregnancy: Care Instructions  Your baby's lungs are getting ready for breathing. Your baby may respond to your voice. Your baby likely turns less, and kicks or jerks more. Jerking may mean that your baby has hiccups.    Think about taking childbirth classes. And start to think about whether you want to have pain medicine during labor.   At your next doctor visit, you may be tested for anemia and for high blood sugar that first occurs during pregnancy (gestational diabetes). These conditions can cause problems for you and your baby.         To ease discomfort, such as back pain   Change your position often. Try not to sit or stand for too long.  Get some exercise. Things like walking or stretching may help.  Try using a heating pad or cold pack.        To ease or reduce swelling in your feet, ankles, hands, and fingers   Take off your rings.  Avoid high-sodium foods, such as potato chips.  Prop up your feet, and sleep with pillows under your feet.  Try to avoid standing for long periods of time.  Do not wear tight shoes.  Wear support stockings.  Kegel exercises to prevent urine from leaking    Squeeze your muscles as if you were trying not to pass gas. Your belly, legs, and buttocks shouldn't move. Hold the squeeze for 3 seconds, then relax for 5 to 10 seconds.    Add 1 second each week until you can squeeze for 10 seconds. Repeat the exercise 10 times a session. Do 3 to 8 sessions a day. If these exercises cause you pain, stop doing them and talk with your doctor.  Follow-up care is a key part of your treatment and safety. Be sure to make and go to all appointments, and call your doctor if you are having problems. It's also a good idea to know your test results and keep a list of the medicines you take.  Where can you learn more?  Go to https://www.healthwise.net/patiented  Enter G264 in the search box to learn more about \"Weeks 22 to 26 of Your Pregnancy: Care Instructions.\"  Current as of: " April 30, 2024  Content Version: 14.3    2024 [employer].   Care instructions adapted under license by your healthcare professional. If you have questions about a medical condition or this instruction, always ask your healthcare professional. [employer] disclaims any warranty or liability for your use of this information.    Round Ligament Pain: Care Instructions  Overview     Round ligament pain is a common pain during pregnancy. You may feel a sharp brief pain on one or both sides of your belly. It may go down into your groin. It's usually felt for the first time during the second trimester. This pain is a normal part of pregnancy.  Your uterus is supported by two ligaments that go from the top and sides of the uterus to the bones of the pelvis. These are the round ligaments. As your uterus grows, these ligaments stretch and tighten with your movements. This may be the cause of the pain. You may find that certain activities seem to cause pain. If you can, avoid those activities.  Your doctor can usually diagnose round ligament pain from your symptoms and an exam. If you have bleeding or other symptoms, your doctor may also do an imaging test, such as an ultrasound. Your doctor may suggest some things that can help the pain, such as rest and strengthening exercises.  Follow-up care is a key part of your treatment and safety. Be sure to make and go to all appointments, and call your doctor if you are having problems. It's also a good idea to know your test results and keep a list of the medicines you take.  How can you care for yourself at home?  If certain movements seem to trigger belly pain, see if you can avoid them or try moving more slowly so the ligaments don't stretch quickly.  Stay active. If your doctor says it's okay, try moderate exercise. You might try things like swimming, walking, or stretching. Ask your doctor about strengthening and stretching exercises that may help.  Try a  "heating pad or cold pack on the area. A warm bath or shower may also help.  Rest when you can.  Ask your doctor about taking acetaminophen for pain. Be safe with medicines. Read and follow all instructions on the label.  Try a belly support band. Some people find that these can help.  When should you call for help?   Call your doctor now or seek immediate medical care if:    You think you might be in labor.     You have new or worse pain.   Watch closely for changes in your health, and be sure to contact your doctor if you have any problems.  Where can you learn more?  Go to https://www.[employer].net/patiented  Enter R110 in the search box to learn more about \"Round Ligament Pain: Care Instructions.\"  Current as of: April 30, 2024  Content Version: 14.3    2024 [employer].   Care instructions adapted under license by your healthcare professional. If you have questions about a medical condition or this instruction, always ask your healthcare professional. [employer] disclaims any warranty or liability for your use of this information.    Leg and Ankle Edema: Care Instructions  Your Care Instructions  Swelling in the legs, ankles, and feet is called edema. It is common after you sit or stand for a while. Long plane flights or car rides often cause swelling in the legs and feet. You may also have swelling if you have to stand for long periods of time at your job. Problems with the veins in the legs (varicose veins) and changes in hormones can also cause swelling. Sometimes the swelling in the ankles and feet is caused by a more serious problem, such as heart failure, infection, blood clots, or liver or kidney disease.  Follow-up care is a key part of your treatment and safety. Be sure to make and go to all appointments, and call your doctor if you are having problems. It's also a good idea to know your test results and keep a list of the medicines you take.  How can you care for yourself at " "home?  If your doctor gave you medicine, take it as prescribed. Call your doctor if you think you are having a problem with your medicine.  Whenever you are resting, raise your legs up. Try to keep the swollen area higher than the level of your heart.  Take breaks from standing or sitting in one position.  Walk around to increase the blood flow in your lower legs.  Move your feet and ankles often while you stand, or tighten and relax your leg muscles.  Wear support stockings. Put them on in the morning, before swelling gets worse.  Eat a balanced diet. Lose weight if you need to.  Limit the amount of salt (sodium) in your diet. Salt holds fluid in the body and may increase swelling.  When should you call for help?   Call 911 anytime you think you may need emergency care. For example, call if:    You have symptoms of a blood clot in your lung (called a pulmonary embolism). These may include:  Sudden chest pain.  Trouble breathing.  Coughing up blood.   Call your doctor now or seek immediate medical care if:    You have signs of a blood clot, such as:  Pain in your calf, back of the knee, thigh, or groin.  Redness and swelling in your leg or groin.     You have symptoms of infection, such as:  Increased pain, swelling, warmth, or redness.  Red streaks or pus.  A fever.   Watch closely for changes in your health, and be sure to contact your doctor if:    Your swelling is getting worse.     You have new or worsening pain in your legs.     You do not get better as expected.   Where can you learn more?  Go to https://www.[employer].net/patiented  Enter N696 in the search box to learn more about \"Leg and Ankle Edema: Care Instructions.\"  Current as of: April 30, 2024  Content Version: 14.3    2024 [employer].   Care instructions adapted under license by your healthcare professional. If you have questions about a medical condition or this instruction, always ask your healthcare professional. [employer] disclaims any warranty or liability for your use of this information.     Labor: Care Instructions  Overview      labor is the start of labor between 20 and 36 weeks of pregnancy. Most babies are born at 37 to 42 weeks of pregnancy. In labor, the uterus contracts to open the cervix. This is the first stage of childbirth.  labor can be caused by a problem with the baby, the mother, or both. Often the cause is not known.  In some cases, doctors use medicines to try to delay labor until 34 or more weeks of pregnancy. By this time, a baby has grown enough so that problems are not likely. In some cases--such as with a serious infection--it is healthier for the baby to be born early. Your treatment will depend on how far along you are in your pregnancy and on your health and your baby's health.  Follow-up care is a key part of your treatment and safety. Be sure to make and go to all appointments, and call your doctor if you are having problems. It's also a good idea to know your test results and keep a list of the medicines you take.  How can you care for yourself at home?  If your doctor prescribed medicines, take them exactly as directed. Call your doctor if you think you are having a problem with your medicine.  Rest until your doctor advises you about activity.  Do not have sexual intercourse unless your doctor says it is safe.  Use sanitary pads if you have vaginal bleeding. Using pads makes it easier to monitor your bleeding.  Do not smoke or allow others to smoke around you. If you need help quitting, talk to your doctor about stop-smoking programs and medicines. These can increase your chances of quitting for good.  When should you call for help?   Call 911  anytime you think you may need emergency care. For example, call if:    You passed out (lost consciousness).     You have a seizure.     You have severe vaginal bleeding.     You have severe pain in your belly or pelvis that doesn't  "get better between contractions.     You have had fluid gushing or leaking from your vagina and you know or think the umbilical cord is bulging into your vagina. If this happens, immediately get down on your knees so your rear end (buttocks) is higher than your head. This will decrease the pressure on the cord until help arrives.   Call your doctor now or seek immediate medical care if:    You have signs of preeclampsia, such as:  Sudden swelling of your face, hands, or feet.  New vision problems (such as dimness, blurring, or seeing spots).  A severe headache.     You have any vaginal bleeding.     You have belly pain or cramping.     You have a fever.     You have had regular contractions (with or without pain) for an hour. This means that you have 6 or more within 1 hour after you change your position and drink fluids.     You have a sudden release of fluid from the vagina.     You have low back pain or pelvic pressure that does not go away.     You notice that your baby has stopped moving or is moving much less than normal.   Watch closely for changes in your health, and be sure to contact your doctor if you have any problems.  Where can you learn more?  Go to https://www.[employer].net/patiented  Enter Q400 in the search box to learn more about \" Labor: Care Instructions.\"  Current as of: 2024  Content Version: 14.3    2024 [employer].   Care instructions adapted under license by your healthcare professional. If you have questions about a medical condition or this instruction, always ask your healthcare professional. [employer] disclaims any warranty or liability for your use of this information.    "

## 2025-01-03 NOTE — NURSING NOTE
Yaritza seen in clinic today for OB visit at 22w5d gestation. VSS. Pt reports good fetal movement, see flowsheet. Pt evaluated for  labor, preeclampsia, vaginal bleeding, infection, fetal wellbeing without concerns. Pt denies bleeding/lof/change in vaginal discharge/contractions/headache/vision changes/chest pain/SOB/edema. Pt notified to review new pt education in AVS, verbally reviewed highlights. Sudha Beckman CNM met with pt and discussed POC. Plan for fetal echo and labs today, plan early gct at next visit. HTN noted in clinic but four days of home BP are all normo tensive with normal HR.    Daily Home BP and HR:  115/73   117/76 HR 98  117/79   113/75 HR 99    Future visits scheduled at . Pt discharged stable and ambulatory.

## 2025-01-06 ENCOUNTER — VIRTUAL VISIT (OUTPATIENT)
Dept: GASTROENTEROLOGY | Facility: CLINIC | Age: 35
End: 2025-01-06
Attending: STUDENT IN AN ORGANIZED HEALTH CARE EDUCATION/TRAINING PROGRAM
Payer: COMMERCIAL

## 2025-01-06 VITALS — HEIGHT: 64 IN | BODY MASS INDEX: 35.07 KG/M2

## 2025-01-06 DIAGNOSIS — K75.4 AUTOIMMUNE HEPATITIS (H): Primary | ICD-10-CM

## 2025-01-06 ASSESSMENT — PAIN SCALES - GENERAL: PAINLEVEL_OUTOF10: NO PAIN (0)

## 2025-01-06 NOTE — NURSING NOTE
Current patient location: Patient declined to provide     Is the patient currently in the state of [place]? YES    Visit mode:VIDEO    If the visit is dropped, the patient can be reconnected by:VIDEO VISIT: Text to cell phone:   Telephone Information:   Mobile [phone]       Will anyone else be joining the visit? NO  (If patient encounters technical issues they should call [phone]    Are changes needed to the allergy or medication list? No    Are refills needed on medications prescribed by this physician? NO    Rooming Documentation:  Questionnaire(s) not pre-assigned    Reason for visit: RECHECK (F/U)    Adore CORDOVA

## 2025-01-06 NOTE — PROGRESS NOTES
Virtual Visit Details    Type of service:  Video Visit     Originating Location (pt. Location): Home    Distant Location (provider location):  On-site  Platform used for Video Visit: [employer]  Start Time: 10:34 AM  End Time: 10: 48 AM

## 2025-01-06 NOTE — LETTER
"1/6/2025      Yaritza Dias  [address] [place] [address]      Dear Colleague,    Thank you for referring your patient, Yaritza Dias, to the [facility] [place]. Please see a copy of my visit note below.    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 Dias is a 34 year old woman with a history of mild LFT elevation during pregnancy, who presents for follow up of severe AIH with fibrosis    Initial history:    She presented with jaundice the end of June 2021. Received her 2/2 COVID shots 5/2021. She had mild AST/ALT elevations during pregnancy, but normalized after pregnancy. She went to her PCP and got labs that showed   TBR 12.8 DBR 7.6 INR 1.6. She had a MRI 7/2/2021 that showed pericholecystic fluid. Also showed 4 \"masses\" in the liver - favored regenerative nodules. \"Slightly hyperintense on precontrast T1-weighted imaging, exhibits hypoechoic intensity on postcontrast imaging with enhancement of septations, exhibits diffusion restriction, and is hypointense on T2-weighted imaging. The largest mass is in the medial left hepatic lobe and is 7.7 x 6.5 x 6.2 cm. The right hepatic lobe is small. Much of the liver is in the left upper quadrant.\" Multiple liver cysts. Biliary system normal without dilation. Spleen was mildly enlarged with a few prominent belkis hepatic lymph nodes    Underwent liver bx 7/6 that showing severe hepatitis necrosis, thought to be autoimmune hepatitis, was started on prednisone 40 mg on July 14.  She had repeat labs done on July 18 showing worsening alkaline phosphatase and INR.  Patient also had hypotension upon her outpatient lab draw, upon second admission her white count was elevated concerning for sepsis, she was treated with antibiotics.  Her INR " slightly went up to 2.2, patient was transferred to [facility] for further evaluation.    While admitted to [facility], labs were stable and LFTs slowly improving. Weaned of steroids 12/2021    MRCP 2/2022 showing lobular appearing liver and enlarged spleen - relatively unchanged since her last MRI    Underwent random liver bx 3/2022 showing Regenerative changes consistent with resolved severe hepatitis     Comment  Liver biopsy is optimal for evaluation by usual criteria and shows minimal portal or lobular inflammation. There is focal bile ductular  reaction without any evidence of cholestasis. No portal or lobular necroinflammatory activity is noted. Trichrome stain shows mild  portal fibrosis. Reticulin stain show features of regenerative nodular hyperplasia. PAS/PAS-D stains are negative for any significant  intracytoplasmic accumulations. Iron stain show no significant iron deposition.  Overall the changes seen in this biopsy are that of resolved severe hepatitis with mild portal fibrosis and features of nodular  regenerative hyperplasia. There is no evidence of active liver disease.    EGD 9/2022 without varices    Interval Events  - Doing well on imuran 50 mg daily  - feeling well about 24 weeks pregnant, does not think she wants to breastfeed. Reviewed her previous cross sectional imaging with our radiology team, no evidence of splenic artery aneurysm  - EGD 11/2024 without varices    ROS: 14 point ROS negative except for positives noted in HPI.    PMHx:  Past Medical History:   Diagnosis Date     Autoimmune hepatitis (H)      Gestational diabetes      Kidney stone      Motion sickness    Autoimmune hepatitis potentially triggered by COVID vaccination    PSHx:   Vaginal delivery  Liver bx    FamHx:  Family History   Problem Relation Age of Onset     Hypertension Mother      No Known Problems Father      Celiac Disease Sister      No Known Problems Sister      No Known Problems Sister      No Known Problems Maternal  Grandmother      No Known Problems Maternal Grandfather      No Known Problems Paternal Grandmother      No Known Problems Paternal Grandfather      Heart Defect Daughter         Pulmonary valve stenosis     Anesthesia Reaction No family hx of      Malignant Hyperthermia No family hx of      Deep Vein Thrombosis No family hx of      Genetic Disease No family hx of      Birth defects No family hx of      Breast Cancer No family hx of      Ovarian cysts No family hx of    Father had coronary artery disease, sister has celiac disease, no other autoimmune disorders in family.  Mother in law had liver transplant for ALD    SocHx:  Social History     Socioeconomic History     Marital status:      Spouse name: Not on file     Number of children: Not on file     Years of education: Not on file     Highest education level: Not on file   Occupational History     Not on file   Tobacco Use     Smoking status: Never     Passive exposure: Never     Smokeless tobacco: Never   Vaping Use     Vaping status: Never Used   Substance and Sexual Activity     Alcohol use: Not Currently     Drug use: Never     Sexual activity: Yes     Partners: Male   Other Topics Concern     Parent/sibling w/ CABG, MI or angioplasty before 65F 55M? Not Asked   Social History Narrative    Lives with her , Jose R and their daughter Tesha.      Social Drivers of Health     Financial Resource Strain: Low Risk  (1/11/2024)    Financial Resource Strain      Within the past 12 months, have you or your family members you live with been unable to get utilities (heat, electricity) when it was really needed?: No   Food Insecurity: Low Risk  (1/11/2024)    Food Insecurity      Within the past 12 months, did you worry that your food would run out before you got money to buy more?: No      Within the past 12 months, did the food you bought just not last and you didn t have money to get more?: No   Transportation Needs: Low Risk  (1/11/2024)     Transportation Needs      Within the past 12 months, has lack of transportation kept you from medical appointments, getting your medicines, non-medical meetings or appointments, work, or from getting things that you need?: No   Physical Activity: Not on file   Stress: Not on file   Social Connections: Not on file   Interpersonal Safety: Low Risk  (1/3/2025)    Interpersonal Safety      Do you feel physically and emotionally safe where you currently live?: Yes      Within the past 12 months, have you been hit, slapped, kicked or otherwise physically hurt by someone?: No      Within the past 12 months, have you been humiliated or emotionally abused in other ways by your partner or ex-partner?: No   Recent Concern: Interpersonal Safety - High Risk (11/22/2024)    Interpersonal Safety      Do you feel physically and emotionally safe where you currently live?: No      Within the past 12 months, have you been hit, slapped, kicked or otherwise physically hurt by someone?: No      Within the past 12 months, have you been humiliated or emotionally abused in other ways by your partner or ex-partner?: No   Housing Stability: Low Risk  (1/11/2024)    Housing Stability      Do you have housing? : Yes      Are you worried about losing your housing?: No       Medications:  Current Outpatient Medications   Medication Sig Dispense Refill     aspirin 81 MG EC tablet Take 81 mg by mouth daily.       azaTHIOprine (IMURAN) 50 MG tablet Take 1 tablet (50 mg) by mouth daily. 90 tablet 1     Prenatal Vit-DSS-Fe Cbn-FA (PRENATAL AD PO) Take by mouth.       No current facility-administered medications for this visit.       Allergies:  Allergies   Allergen Reactions     Penicillins GI Disturbance       Objective:  LMP 07/28/2024   Constitutional: pleasant woman in NAD  Eyes: non icteric  Respiratory: Normal respiratory excursion   MSK: normal range of motion of visualized extremities  Abd: Non distended  Skin: No jaundice  Psychiatric: normal  mood and orientation    Labs: reviewed in EHR    AMA negative  ROLANDO + 1:640  F actin 9  IgG normal  Anti LKM negative  SLA negative    Hepatitis A IgG positive, IgM negative  Hepatitis B Sag negative, core negative, ab immune  TPMT normal 30.9 (22-44)    MRCP 2/19/2022    Liver: Architectural distortion of the liver parenchyma, lobulated  with nodular contours, capsular retraction, atrophic right lobe,  elongated left hepatic lobe, scattered liver cysts and nodular areas  of abnormal decreased enhancement in the arterial, portal venous and  delayed phases. Nodular foci demonstrate mild increased precontrast T1  signal, decreased T2 signal and no restricted diffusion in the present  study. Mild prominent intrahepatic biliary tree dilation. Unremarkable  common bile duct.     Gallbladder: No cholelithiasis. No gallbladder wall thickening. No  pericholecystic fluid.     Spleen: Splenule. Enlarged spleen measuring 16 cm in craniocaudad  dimension. No focal lesions.     Kidneys: No focal lesions. No hydronephrosis.     Adrenal glands: No nodules.     Pancreas: Preserved increased precontrast T1 signal. Main pancreatic  duct is not dilated. No focal lesions.     Bowel: No dilated loops of bowel.     Lymph nodes: Prominent belkis hepatis lymph nodes, likely reactive.  Additional prominent retroperitoneal and mesenteric lymph nodes,  stable.     Blood vessels: Patent liver vasculature. Patent abdominal vasculature.  No abdominal aortic aneurysm.     Lung bases: Unremarkable.     Bones and soft tissues: No aggressive bone lesions.     Mesentery and abdominal wall: Unremarkable.     Ascites: No ascites.     IMPRESSION: In this patient with history of altered immune hepatitis  and biopsy-proven hepatic parenchyma necrosis:  1. Lobulated nodular liver parenchyma with capsular retraction and  nodular areas of hypoenhancement, grossly stable from 7/3/2021 with no  convincing acute MRI findings on the present study.  2.  Splenomegaly. No ascites.  3. Additional incidental findings as described above.    RUQ US 7/22/2021    GALLBLADDER: Not evaluated     LIVER: Partially visualized liver lesions, better evaluated on the recent MRI.       IVC: Normal where visualized.     ABDOMINAL DUPLEX: Technically limited due to overlying bowel gas and body habitus. The middle hepatic vein is not visualized. The left hepatic vein is patent with flow in the normal direction. The right hepatic vein is diminutive but patent with flow in the normal direction.. The hepatic artery, IVC, portal veins, and splenic vein are patent with flow in the normal direction.     IMPRESSION:   1.  Technically limited Doppler evaluation. The middle hepatic vein is not visualized. The left hepatic vein is patent with flow in the normal direction. The right hepatic vein is diminutive but patent with flow in the normal direction. The hepatic artery, IVC, portal veins, and splenic vein are patent with flow in the normal direction.     MRI ABD 7/3/21:    IMPRESSION:   1.  Multiple liver masses are indeterminate. Regenerative nodules are favored. Metastatic disease is less likely given the patient's age. Atypical presentation of a primary liver mass is possible. Follow-up MRI could be considered. Biopsy could be considered.   2.  Mild splenomegaly is indeterminate. Portal hypertension could contribute to splenomegaly.   3.  Mild pericholecystic fluid is nonspecific. Acute cholecystitis is not excluded. There is no bile duct dilatation. No biliary filling defects or strictures identified.         MRI ABD 7/22/21:  IMPRESSION:   1.  Edematous gallbladder wall thickening has increased. Trace pericholecystic fluid is unchanged. Findings may indicate acalculus cholecystitis. Consider nuclear medicine hepatobiliary scan.     2.  Stable atypical appearance of the liver consistent with nonspecific heterogeneous parenchymal disease. The hepatic veins are poorly visualized. The  differential diagnosis includes venoocclusive disease.     3.  Trace peripancreatic fluid has mildly increased and suggests acute pancreatitis.      Liver Bx:  LIVER, NEEDLE BIOPSY:  Hepatitis, severe, grade 4 /4 activity:   -Etiologic considerations include vaccine-induced autoimmune hepatitis vs. Other   -No fibrosis or neoplastic/mass lesion identified     Assessment  32 - year-old female with no significant past medical history, who presents for follow up of her severe hepatic necrosis.      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 Has a history of mild AST/ALT elevation in the past, may have been predisposed to AI hepatitis, which can also flare after childbirth.     Independently reviewed labs and imaging.     Initial Bx showing severe hepatitis with necrosis - felt to be autoimmune type hepatitis potentially triggered by COVID vaccination. She had a liver of elevated LFTs that improved post partum, potentially predisposing her to a flare as well.     Her LFTs have been slowly improving with steroids + imuran + ursodiol. INR improving as well. Stopped prednisone 12/2021. LFTs yadira when stopping imuran, had to restart and now is on low dose (50 mg daily) imuran.  She has had mild leukopenia with Imuran.    Started liver transplant evaluation while inpatient, given her improvement with medical therapy, closed transplant evaluation.     Underwent follow-up liver biopsy that showed  resolved hepatitis, but reticulin stain did show findings of nodular regenerative hyperplasia.  Given her history, feel the nodular regenerative hyperplasia was secondary to the initial insult, rather than Imuran use.  Even though she only had periportal fibrosis in the biopsy, essentially feel she has some noncirrhotic portal hypertension, splenomegaly.  She did not have varices on upper endoscopy Fall 2024 (done in second trimester). We reviewed her cross sectional imaging and no evidence of  splenic artery aneurysm  as well. LFTs have been normal during pregnancy. She is following with MFM    - Repeat liver enzymes with CBC and differential 2-3 months while pregnant, then every 4-6 weeks after she delivers  - She sees dermatology annual no history of skin cancer  - Continue Imuran 50 mg daily - may try to wean again in the future    RTC 6 months.    Ashlee Borden MD MS  Hepatology/Liver Transplant  [facility]          Virtual Visit Details    Type of service:  Video Visit     Originating Location (pt. Location): Home    Distant Location (provider location):  On-site  Platform used for Video Visit: [employer]  Start Time: 10:34 AM  End Time: 10: 48 AM       Again, thank you for allowing me to participate in the care of your patient.        Sincerely,        Ashlee Borden MD    Electronically signed

## 2025-01-06 NOTE — PROGRESS NOTES
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 Dias is a 34 year old woman with a history of mild LFT elevation during pregnancy, who presents for follow up of severe AIH with fibrosis    Initial history:    She presented with jaundice the end of June 2021. Received her 2/2 COVID shots 5/2021. She had mild AST/ALT elevations during pregnancy, but normalized after pregnancy. She went to her PCP and got labs that showed   TBR 12.8 DBR 7.6 INR 1.6. She had a MRI 7/2/2021 that showed pericholecystic fluid. Also showed 4 \"masses\" in the liver - favored regenerative nodules. \"Slightly hyperintense on precontrast T1-weighted imaging, exhibits hypoechoic intensity on postcontrast imaging with enhancement of septations, exhibits diffusion restriction, and is hypointense on T2-weighted imaging. The largest mass is in the medial left hepatic lobe and is 7.7 x 6.5 x 6.2 cm. The right hepatic lobe is small. Much of the liver is in the left upper quadrant.\" Multiple liver cysts. Biliary system normal without dilation. Spleen was mildly enlarged with a few prominent belkis hepatic lymph nodes    Underwent liver bx 7/6 that showing severe hepatitis necrosis, thought to be autoimmune hepatitis, was started on prednisone 40 mg on July 14.  She had repeat labs done on July 18 showing worsening alkaline phosphatase and INR.  Patient also had hypotension upon her outpatient lab draw, upon second admission her white count was elevated concerning for sepsis, she was treated with antibiotics.  Her INR slightly went up to 2.2, patient was transferred to [facility] for further evaluation.    While admitted to [facility], labs were stable and LFTs slowly improving. Weaned of steroids 12/2021    MRCP 2/2022 showing lobular appearing liver and enlarged spleen - " relatively unchanged since her last MRI    Underwent random liver bx 3/2022 showing Regenerative changes consistent with resolved severe hepatitis     Comment  Liver biopsy is optimal for evaluation by usual criteria and shows minimal portal or lobular inflammation. There is focal bile ductular  reaction without any evidence of cholestasis. No portal or lobular necroinflammatory activity is noted. Trichrome stain shows mild  portal fibrosis. Reticulin stain show features of regenerative nodular hyperplasia. PAS/PAS-D stains are negative for any significant  intracytoplasmic accumulations. Iron stain show no significant iron deposition.  Overall the changes seen in this biopsy are that of resolved severe hepatitis with mild portal fibrosis and features of nodular  regenerative hyperplasia. There is no evidence of active liver disease.    EGD 9/2022 without varices    Interval Events  - Doing well on imuran 50 mg daily  - feeling well about 24 weeks pregnant, does not think she wants to breastfeed. Reviewed her previous cross sectional imaging with our radiology team, no evidence of splenic artery aneurysm  - EGD 11/2024 without varices    ROS: 14 point ROS negative except for positives noted in HPI.    PMHx:  Past Medical History:   Diagnosis Date    Autoimmune hepatitis (H)     Gestational diabetes     Kidney stone     Motion sickness    Autoimmune hepatitis potentially triggered by COVID vaccination    PSHx:   Vaginal delivery  Liver bx    FamHx:  Family History   Problem Relation Age of Onset    Hypertension Mother     No Known Problems Father     Celiac Disease Sister     No Known Problems Sister     No Known Problems Sister     No Known Problems Maternal Grandmother     No Known Problems Maternal Grandfather     No Known Problems Paternal Grandmother     No Known Problems Paternal Grandfather     Heart Defect Daughter         Pulmonary valve stenosis    Anesthesia Reaction No family hx of     Malignant  Hyperthermia No family hx of     Deep Vein Thrombosis No family hx of     Genetic Disease No family hx of     Birth defects No family hx of     Breast Cancer No family hx of     Ovarian cysts No family hx of    Father had coronary artery disease, sister has celiac disease, no other autoimmune disorders in family.  Mother in law had liver transplant for ALD    SocHx:  Social History     Socioeconomic History    Marital status:      Spouse name: Not on file    Number of children: Not on file    Years of education: Not on file    Highest education level: Not on file   Occupational History    Not on file   Tobacco Use    Smoking status: Never     Passive exposure: Never    Smokeless tobacco: Never   Vaping Use    Vaping status: Never Used   Substance and Sexual Activity    Alcohol use: Not Currently    Drug use: Never    Sexual activity: Yes     Partners: Male   Other Topics Concern    Parent/sibling w/ CABG, MI or angioplasty before 65F 55M? Not Asked   Social History Narrative    Lives with her , Jose R and their daughter Tesha.      Social Drivers of Health     Financial Resource Strain: Low Risk  (1/11/2024)    Financial Resource Strain     Within the past 12 months, have you or your family members you live with been unable to get utilities (heat, electricity) when it was really needed?: No   Food Insecurity: Low Risk  (1/11/2024)    Food Insecurity     Within the past 12 months, did you worry that your food would run out before you got money to buy more?: No     Within the past 12 months, did the food you bought just not last and you didn t have money to get more?: No   Transportation Needs: Low Risk  (1/11/2024)    Transportation Needs     Within the past 12 months, has lack of transportation kept you from medical appointments, getting your medicines, non-medical meetings or appointments, work, or from getting things that you need?: No   Physical Activity: Not on file   Stress: Not on file   Social  Connections: Not on file   Interpersonal Safety: Low Risk  (1/3/2025)    Interpersonal Safety     Do you feel physically and emotionally safe where you currently live?: Yes     Within the past 12 months, have you been hit, slapped, kicked or otherwise physically hurt by someone?: No     Within the past 12 months, have you been humiliated or emotionally abused in other ways by your partner or ex-partner?: No   Recent Concern: Interpersonal Safety - High Risk (11/22/2024)    Interpersonal Safety     Do you feel physically and emotionally safe where you currently live?: No     Within the past 12 months, have you been hit, slapped, kicked or otherwise physically hurt by someone?: No     Within the past 12 months, have you been humiliated or emotionally abused in other ways by your partner or ex-partner?: No   Housing Stability: Low Risk  (1/11/2024)    Housing Stability     Do you have housing? : Yes     Are you worried about losing your housing?: No       Medications:  Current Outpatient Medications   Medication Sig Dispense Refill    aspirin 81 MG EC tablet Take 81 mg by mouth daily.      azaTHIOprine (IMURAN) 50 MG tablet Take 1 tablet (50 mg) by mouth daily. 90 tablet 1    Prenatal Vit-DSS-Fe Cbn-FA (PRENATAL AD PO) Take by mouth.       No current facility-administered medications for this visit.       Allergies:  Allergies   Allergen Reactions    Penicillins GI Disturbance       Objective:  LMP 07/28/2024   Constitutional: pleasant woman in NAD  Eyes: non icteric  Respiratory: Normal respiratory excursion   MSK: normal range of motion of visualized extremities  Abd: Non distended  Skin: No jaundice  Psychiatric: normal mood and orientation    Labs: reviewed in EHR    AMA negative  ROLANDO + 1:640  F actin 9  IgG normal  Anti LKM negative  SLA negative    Hepatitis A IgG positive, IgM negative  Hepatitis B Sag negative, core negative, ab immune  TPMT normal 30.9 (22-44)    MRCP 2/19/2022    Liver: Architectural  distortion of the liver parenchyma, lobulated  with nodular contours, capsular retraction, atrophic right lobe,  elongated left hepatic lobe, scattered liver cysts and nodular areas  of abnormal decreased enhancement in the arterial, portal venous and  delayed phases. Nodular foci demonstrate mild increased precontrast T1  signal, decreased T2 signal and no restricted diffusion in the present  study. Mild prominent intrahepatic biliary tree dilation. Unremarkable  common bile duct.     Gallbladder: No cholelithiasis. No gallbladder wall thickening. No  pericholecystic fluid.     Spleen: Splenule. Enlarged spleen measuring 16 cm in craniocaudad  dimension. No focal lesions.     Kidneys: No focal lesions. No hydronephrosis.     Adrenal glands: No nodules.     Pancreas: Preserved increased precontrast T1 signal. Main pancreatic  duct is not dilated. No focal lesions.     Bowel: No dilated loops of bowel.     Lymph nodes: Prominent belkis hepatis lymph nodes, likely reactive.  Additional prominent retroperitoneal and mesenteric lymph nodes,  stable.     Blood vessels: Patent liver vasculature. Patent abdominal vasculature.  No abdominal aortic aneurysm.     Lung bases: Unremarkable.     Bones and soft tissues: No aggressive bone lesions.     Mesentery and abdominal wall: Unremarkable.     Ascites: No ascites.     IMPRESSION: In this patient with history of altered immune hepatitis  and biopsy-proven hepatic parenchyma necrosis:  1. Lobulated nodular liver parenchyma with capsular retraction and  nodular areas of hypoenhancement, grossly stable from 7/3/2021 with no  convincing acute MRI findings on the present study.  2. Splenomegaly. No ascites.  3. Additional incidental findings as described above.    RUQ US 7/22/2021    GALLBLADDER: Not evaluated     LIVER: Partially visualized liver lesions, better evaluated on the recent MRI.       IVC: Normal where visualized.     ABDOMINAL DUPLEX: Technically limited due to  overlying bowel gas and body habitus. The middle hepatic vein is not visualized. The left hepatic vein is patent with flow in the normal direction. The right hepatic vein is diminutive but patent with flow in the normal direction.. The hepatic artery, IVC, portal veins, and splenic vein are patent with flow in the normal direction.     IMPRESSION:   1.  Technically limited Doppler evaluation. The middle hepatic vein is not visualized. The left hepatic vein is patent with flow in the normal direction. The right hepatic vein is diminutive but patent with flow in the normal direction. The hepatic artery, IVC, portal veins, and splenic vein are patent with flow in the normal direction.     MRI ABD 7/3/21:    IMPRESSION:   1.  Multiple liver masses are indeterminate. Regenerative nodules are favored. Metastatic disease is less likely given the patient's age. Atypical presentation of a primary liver mass is possible. Follow-up MRI could be considered. Biopsy could be considered.   2.  Mild splenomegaly is indeterminate. Portal hypertension could contribute to splenomegaly.   3.  Mild pericholecystic fluid is nonspecific. Acute cholecystitis is not excluded. There is no bile duct dilatation. No biliary filling defects or strictures identified.         MRI ABD 7/22/21:  IMPRESSION:   1.  Edematous gallbladder wall thickening has increased. Trace pericholecystic fluid is unchanged. Findings may indicate acalculus cholecystitis. Consider nuclear medicine hepatobiliary scan.     2.  Stable atypical appearance of the liver consistent with nonspecific heterogeneous parenchymal disease. The hepatic veins are poorly visualized. The differential diagnosis includes venoocclusive disease.     3.  Trace peripancreatic fluid has mildly increased and suggests acute pancreatitis.      Liver Bx:  LIVER, NEEDLE BIOPSY:  Hepatitis, severe, grade 4 /4 activity:   -Etiologic considerations include vaccine-induced autoimmune hepatitis vs.  Other   -No fibrosis or neoplastic/mass lesion identified     Assessment  32 - year-old female with no significant past medical history, who presents for follow up of her severe hepatic necrosis.      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 Has a history of mild AST/ALT elevation in the past, may have been predisposed to AI hepatitis, which can also flare after childbirth.     Independently reviewed labs and imaging.     Initial Bx showing severe hepatitis with necrosis - felt to be autoimmune type hepatitis potentially triggered by COVID vaccination. She had a liver of elevated LFTs that improved post partum, potentially predisposing her to a flare as well.     Her LFTs have been slowly improving with steroids + imuran + ursodiol. INR improving as well. Stopped prednisone 12/2021. LFTs yadira when stopping imuran, had to restart and now is on low dose (50 mg daily) imuran.  She has had mild leukopenia with Imuran.    Started liver transplant evaluation while inpatient, given her improvement with medical therapy, closed transplant evaluation.     Underwent follow-up liver biopsy that showed  resolved hepatitis, but reticulin stain did show findings of nodular regenerative hyperplasia.  Given her history, feel the nodular regenerative hyperplasia was secondary to the initial insult, rather than Imuran use.  Even though she only had periportal fibrosis in the biopsy, essentially feel she has some noncirrhotic portal hypertension, splenomegaly.  She did not have varices on upper endoscopy Fall 2024 (done in second trimester). We reviewed her cross sectional imaging and no evidence of splenic artery aneurysm  as well. LFTs have been normal during pregnancy. She is following with MFM    - Repeat liver enzymes with CBC and differential 2-3 months while pregnant, then every 4-6 weeks after she delivers  - She sees dermatology annual no history of skin cancer  - Continue Imuran 50 mg  daily - may try to wean again in the future    RTC 6 months.    Ashlee Borden MD MS  Hepatology/Liver Transplant  [facility]

## 2025-01-31 ENCOUNTER — HOSPITAL ENCOUNTER (OUTPATIENT)
Dept: ULTRASOUND IMAGING | Facility: CLINIC | Age: 35
Discharge: HOME | End: 2025-01-31
Attending: STUDENT IN AN ORGANIZED HEALTH CARE EDUCATION/TRAINING PROGRAM
Payer: COMMERCIAL

## 2025-01-31 DIAGNOSIS — O26.92 PREGNANCY RELATED CONDITION IN SECOND TRIMESTER: ICD-10-CM

## 2025-01-31 DIAGNOSIS — I15.9 SECONDARY HYPERTENSION: ICD-10-CM

## 2025-01-31 DIAGNOSIS — R00.0 TACHYCARDIA: ICD-10-CM

## 2025-01-31 DIAGNOSIS — D69.6 THROMBOCYTOPENIA: ICD-10-CM

## 2025-01-31 PROBLEM — Z86.32 HISTORY OF DIET CONTROLLED GESTATIONAL DIABETES MELLITUS (GDM): Status: ACTIVE | Noted: 2020-11-13

## 2025-01-31 PROBLEM — O09.299 HISTORY OF POSTPARTUM HEMORRHAGE, CURRENTLY PREGNANT: Status: ACTIVE | Noted: 2021-02-05

## 2025-01-31 PROBLEM — R10.9 RIGHT LATERAL ABDOMINAL PAIN: Status: RESOLVED | Noted: 2024-02-23 | Resolved: 2025-01-31

## 2025-01-31 PROCEDURE — 76816 OB US FOLLOW-UP PER FETUS: CPT

## 2025-02-28 ENCOUNTER — LAB (OUTPATIENT)
Dept: LAB | Facility: CLINIC | Age: 35
End: 2025-02-28
Attending: OBSTETRICS & GYNECOLOGY
Payer: COMMERCIAL

## 2025-02-28 ENCOUNTER — HOSPITAL ENCOUNTER (OUTPATIENT)
Dept: ULTRASOUND IMAGING | Facility: CLINIC | Age: 35
Discharge: HOME | End: 2025-02-28
Attending: OBSTETRICS & GYNECOLOGY
Payer: COMMERCIAL

## 2025-02-28 DIAGNOSIS — R00.0 TACHYCARDIA: ICD-10-CM

## 2025-02-28 DIAGNOSIS — O09.529 SUPERVISION OF HIGH-RISK PREGNANCY OF ELDERLY MULTIGRAVIDA: ICD-10-CM

## 2025-02-28 DIAGNOSIS — O26.92 PREGNANCY RELATED CONDITION IN SECOND TRIMESTER: ICD-10-CM

## 2025-02-28 DIAGNOSIS — D69.6 THROMBOCYTOPENIA: ICD-10-CM

## 2025-02-28 DIAGNOSIS — K75.4 AUTOIMMUNE HEPATITIS (H): ICD-10-CM

## 2025-02-28 DIAGNOSIS — O10.919 CHRONIC HYPERTENSION IN PREGNANCY: ICD-10-CM

## 2025-02-28 LAB
ALBUMIN SERPL BCG-MCNC: 3.4 G/DL (ref 3.5–5.2)
ALP SERPL-CCNC: 67 U/L (ref 40–150)
ALT SERPL W P-5'-P-CCNC: 8 U/L (ref 0–50)
ANION GAP SERPL CALCULATED.3IONS-SCNC: 11 MMOL/L (ref 7–15)
APTT PPP: 27 SECONDS (ref 22–38)
AST SERPL W P-5'-P-CCNC: 13 U/L (ref 0–45)
BILIRUB DIRECT SERPL-MCNC: 0.15 MG/DL (ref 0–0.3)
BILIRUB SERPL-MCNC: 0.5 MG/DL
BUN SERPL-MCNC: 6.7 MG/DL (ref 6–20)
CALCIUM SERPL-MCNC: 9.2 MG/DL (ref 8.8–10.4)
CHLORIDE SERPL-SCNC: 105 MMOL/L (ref 98–107)
CREAT SERPL-MCNC: 0.55 MG/DL (ref 0.51–0.95)
EGFRCR SERPLBLD CKD-EPI 2021: >90 ML/MIN/1.73M2
ERYTHROCYTE [DISTWIDTH] IN BLOOD BY AUTOMATED COUNT: 14.6 % (ref 10–15)
GLUCOSE SERPL-MCNC: 84 MG/DL (ref 70–99)
HCO3 SERPL-SCNC: 20 MMOL/L (ref 22–29)
HCT VFR BLD AUTO: 31.9 % (ref 35–47)
HGB BLD-MCNC: 10.5 G/DL (ref 11.7–15.7)
INR PPP: 1.04 (ref 0.85–1.15)
MCH RBC QN AUTO: 27.1 PG (ref 26.5–33)
MCHC RBC AUTO-ENTMCNC: 32.9 G/DL (ref 31.5–36.5)
MCV RBC AUTO: 82 FL (ref 78–100)
PLATELET # BLD AUTO: 114 10E3/UL (ref 150–450)
POTASSIUM SERPL-SCNC: 4.2 MMOL/L (ref 3.4–5.3)
PROT SERPL-MCNC: 6.1 G/DL (ref 6.4–8.3)
RBC # BLD AUTO: 3.88 10E6/UL (ref 3.8–5.2)
SODIUM SERPL-SCNC: 136 MMOL/L (ref 135–145)
T PALLIDUM AB SER QL: NONREACTIVE
WBC # BLD AUTO: 6.8 10E3/UL (ref 4–11)

## 2025-02-28 PROCEDURE — 82248 BILIRUBIN DIRECT: CPT

## 2025-02-28 PROCEDURE — 80053 COMPREHEN METABOLIC PANEL: CPT

## 2025-02-28 PROCEDURE — 76816 OB US FOLLOW-UP PER FETUS: CPT

## 2025-02-28 PROCEDURE — 86780 TREPONEMA PALLIDUM: CPT

## 2025-02-28 PROCEDURE — 76816 OB US FOLLOW-UP PER FETUS: CPT | Mod: 26 | Performed by: OBSTETRICS & GYNECOLOGY

## 2025-02-28 PROCEDURE — 36415 COLL VENOUS BLD VENIPUNCTURE: CPT

## 2025-02-28 PROCEDURE — 85014 HEMATOCRIT: CPT

## 2025-02-28 PROCEDURE — 85610 PROTHROMBIN TIME: CPT

## 2025-02-28 PROCEDURE — 85730 THROMBOPLASTIN TIME PARTIAL: CPT

## 2025-03-05 NOTE — TELEPHONE ENCOUNTER
FUTURE VISIT INFORMATION      SURGERY INFORMATION:  Pregnancy related condition in second trimester [O26.92]   Thrombocytopenia [D69.6]   Tachycardia [R00.0]   Consult: ov 25    RECORDS REQUESTED FROM:       Primary Care Provider: [facility]    Pertinent Medical History: hypertension in pregnancy     Most recent EKG+ Tracin21    Most recent ECHO: 1/3/25

## 2025-03-12 ENCOUNTER — ANESTHESIA EVENT (OUTPATIENT)
Dept: SURGERY | Facility: CLINIC | Age: 35
End: 2025-03-12

## 2025-03-12 ENCOUNTER — HOSPITAL ENCOUNTER (OUTPATIENT)
Dept: ULTRASOUND IMAGING | Facility: CLINIC | Age: 35
Discharge: HOME | End: 2025-03-12
Attending: OBSTETRICS & GYNECOLOGY
Payer: COMMERCIAL

## 2025-03-12 ENCOUNTER — OFFICE VISIT (OUTPATIENT)
Dept: MATERNAL FETAL MEDICINE | Facility: CLINIC | Age: 35
End: 2025-03-12
Attending: OBSTETRICS & GYNECOLOGY
Payer: COMMERCIAL

## 2025-03-12 ENCOUNTER — VIRTUAL VISIT (OUTPATIENT)
Dept: SURGERY | Facility: CLINIC | Age: 35
End: 2025-03-12
Attending: OBSTETRICS & GYNECOLOGY
Payer: COMMERCIAL

## 2025-03-12 ENCOUNTER — PRE VISIT (OUTPATIENT)
Dept: SURGERY | Facility: CLINIC | Age: 35
End: 2025-03-12

## 2025-03-12 VITALS — WEIGHT: 208 LBS | HEIGHT: 64 IN | BODY MASS INDEX: 35.51 KG/M2

## 2025-03-12 VITALS
RESPIRATION RATE: 18 BRPM | BODY MASS INDEX: 36.42 KG/M2 | OXYGEN SATURATION: 99 % | HEART RATE: 112 BPM | DIASTOLIC BLOOD PRESSURE: 85 MMHG | WEIGHT: 212.2 LBS | SYSTOLIC BLOOD PRESSURE: 130 MMHG

## 2025-03-12 DIAGNOSIS — O10.919 CHRONIC HYPERTENSION IN PREGNANCY: ICD-10-CM

## 2025-03-12 DIAGNOSIS — O26.92 PREGNANCY RELATED CONDITION IN SECOND TRIMESTER: ICD-10-CM

## 2025-03-12 DIAGNOSIS — O09.523 MULTIGRAVIDA OF ADVANCED MATERNAL AGE IN THIRD TRIMESTER: ICD-10-CM

## 2025-03-12 DIAGNOSIS — D69.6 THROMBOCYTOPENIA: ICD-10-CM

## 2025-03-12 DIAGNOSIS — O43.199 MARGINAL INSERTION OF UMBILICAL CORD AFFECTING MANAGEMENT OF MOTHER: ICD-10-CM

## 2025-03-12 DIAGNOSIS — R00.0 TACHYCARDIA: ICD-10-CM

## 2025-03-12 DIAGNOSIS — K75.4 AUTOIMMUNE HEPATITIS (H): ICD-10-CM

## 2025-03-12 DIAGNOSIS — O09.529 SUPERVISION OF HIGH-RISK PREGNANCY OF ELDERLY MULTIGRAVIDA: Primary | ICD-10-CM

## 2025-03-12 DIAGNOSIS — Z01.818 NEEDS PRE-ANESTHESIA ASSESSMENT: Primary | ICD-10-CM

## 2025-03-12 DIAGNOSIS — O26.92 PREGNANCY RELATED CONDITION IN SECOND TRIMESTER: Primary | ICD-10-CM

## 2025-03-12 PROCEDURE — 98001 SYNCH AUDIO-VIDEO NEW LOW 30: CPT | Performed by: PHYSICIAN ASSISTANT

## 2025-03-12 PROCEDURE — 76819 FETAL BIOPHYS PROFIL W/O NST: CPT

## 2025-03-12 ASSESSMENT — PAIN SCALES - GENERAL
PAINLEVEL_OUTOF10: NO PAIN (0)
PAINLEVEL_OUTOF10: NO PAIN (0)

## 2025-03-12 NOTE — CONSULTS
Anesthesia Consult Note      Reason for consult:   High Risk OB consult  (Z01.818) Needs pre-anesthesia assessment  (primary encounter diagnosis)  (O26.92) Pregnancy related condition in second trimester  (D69.6) Thrombocytopenia  (R00.0) Tachycardia          Date of Encounter: 3/12/2025  Referring Physician: Simona Laguna MD   Primary Care Physician:  Laureano Douglas  Yaritza Dias is a 34 year old woman who is currently  who is due on 2025. She is being seen in our clinic for high risk OB consult due to medical history of autoimmune hepatitis with thrombocytopenia, anemia, and hypertension. The patient has an OB history significant for: previous pregnancy affected by fetal pulmonary valve stenosis requiring  balloon angioplasty.     OB Hx:       /parity:    Patient states that she had an epidural with her first delivery and did well    History of complications of pregnancy  No previous pregnancy complications or first pregnancy    History of obstetrical surgery  No previous  or uterine surgery    History of bleeding/coagulopathy  Chronic thrombocytopenia    History of anesthesia issues in patient or 1st degree relative  No previous issues with anesthesia for the patient or a first degree relative    History is obtained from the patient and electronic health record.     Past Medical History  Past Medical History:   Diagnosis Date    Autoimmune hepatitis (H)     Gestational diabetes 2020    Kidney stone     Motion sickness        Past Surgical History  Past Surgical History:   Procedure Laterality Date    COMBINED CYSTOSCOPY, RETROGRADES, URETEROSCOPY, LASER HOLMIUM LITHOTRIPSY URETER(S), INSERT STENT Right 2024    Procedure: CYSTOURETEROSCOPY, WITH RETROGRADE PYELOGRAM, LASER LITHOTRIPSY OF URETERAL CALCULUS,STONE BASKSET RETRIVAL AND STENT INSERTION;  Surgeon: Naldo Neil MD;  Location: [facility] OR    ESOPHAGOSCOPY, GASTROSCOPY,  DUODENOSCOPY (EGD), COMBINED N/A 9/8/2022    Procedure: ESOPHAGOGASTRODUODENOSCOPY (EGD);  Surgeon: Ashlee Borden MD;  Location: [facility] OR    ESOPHAGOSCOPY, GASTROSCOPY, DUODENOSCOPY (EGD), COMBINED N/A 11/7/2024    Procedure: Esophagoscopy, gastroscopy, duodenoscopy (EGD), combined;  Surgeon: Enrique Ybarra MD;  Location: [facility] GI    IR LIVER BIOPSY PERCUTANEOUS  3/18/2022       Prior to Admission Medications  Current Outpatient Medications   Medication Sig Dispense Refill    aspirin 81 MG EC tablet Take 81 mg by mouth every morning.      azaTHIOprine (IMURAN) 50 MG tablet Take 1 tablet (50 mg) by mouth daily. (Patient taking differently: Take 50 mg by mouth every morning.) 90 tablet 1    Prenatal Vit-DSS-Fe Cbn-FA (PRENATAL AD PO) Take 1 tablet by mouth every evening.         Menstrual history: Patient's last menstrual period was 07/28/2024.     Allergies  Allergies   Allergen Reactions    Penicillins GI Disturbance       Social History  Social History     Socioeconomic History    Marital status:      Spouse name: Not on file    Number of children: Not on file    Years of education: Not on file    Highest education level: Not on file   Occupational History    Not on file   Tobacco Use    Smoking status: Never     Passive exposure: Never    Smokeless tobacco: Never   Vaping Use    Vaping status: Never Used   Substance and Sexual Activity    Alcohol use: Not Currently    Drug use: Never    Sexual activity: Yes     Partners: Male   Other Topics Concern    Parent/sibling w/ CABG, MI or angioplasty before 65F 55M? Not Asked   Social History Narrative    Lives with her , Jose R and their daughter Tesha.      Social Drivers of Health     Financial Resource Strain: Low Risk  (1/11/2024)    Financial Resource Strain     Within the past 12 months, have you or your family members you live with been unable to get utilities (heat, electricity) when it was really needed?: No   Food Insecurity: Low Risk   (1/11/2024)    Food Insecurity     Within the past 12 months, did you worry that your food would run out before you got money to buy more?: No     Within the past 12 months, did the food you bought just not last and you didn t have money to get more?: No   Transportation Needs: Low Risk  (1/11/2024)    Transportation Needs     Within the past 12 months, has lack of transportation kept you from medical appointments, getting your medicines, non-medical meetings or appointments, work, or from getting things that you need?: No   Physical Activity: Not on file   Stress: Not on file   Social Connections: Not on file   Interpersonal Safety: Low Risk  (2/28/2025)    Interpersonal Safety     Do you feel physically and emotionally safe where you currently live?: Yes     Within the past 12 months, have you been hit, slapped, kicked or otherwise physically hurt by someone?: No     Within the past 12 months, have you been humiliated or emotionally abused in other ways by your partner or ex-partner?: No   Housing Stability: Low Risk  (1/11/2024)    Housing Stability     Do you have housing? : Yes     Are you worried about losing your housing?: No       Family History  Family History   Problem Relation Age of Onset    Hypertension Mother     No Known Problems Father     Celiac Disease Sister     No Known Problems Sister     No Known Problems Sister     No Known Problems Maternal Grandmother     No Known Problems Maternal Grandfather     No Known Problems Paternal Grandmother     No Known Problems Paternal Grandfather     Heart Defect Daughter         Pulmonary valve stenosis    Anesthesia Reaction No family hx of     Malignant Hyperthermia No family hx of     Deep Vein Thrombosis No family hx of     Genetic Disease No family hx of     Birth defects No family hx of     Breast Cancer No family hx of     Ovarian cysts No family hx of        The complete review of systems is negative other than noted in the HPI or here. Anesthesia  Evaluation   Pt has had prior anesthetic.     No history of anesthetic complications       ROS/MED HX  ENT/Pulmonary:  - neg pulmonary ROS     Neurologic:  - neg neurologic ROS     Cardiovascular: Comment: Tachycardia    (+)  hypertension- -   -  - -                                 Previous cardiac testing   Echo: Date: 2021 Results:  Interpretation Summary  Global and regional left ventricular function is normal with an EF of 60-65%.  Right ventricular function, chamber size, wall motion, and thickness are  normal.  Pulmonary artery systolic pressure is normal.  The inferior vena cava is normal.  No pericardial effusion is present.  There is no prior study for direct comparison.    Stress Test:  Date: Results:    ECG Reviewed:  Date: 2021 Results:  Sinus tachycardia  Cath:  Date: Results:      METS/Exercise Tolerance: >4 METS    Hematologic: Comments: Thrombocytopenia    (+)      anemia,       (-) history of blood clots and history of blood transfusion   Musculoskeletal:  - neg musculoskeletal ROS     GI/Hepatic:     (+)           hepatitis type Other,     (-) GERD   Renal/Genitourinary:  - neg Renal ROS     Endo:  - neg endo ROS     Psychiatric/Substance Use:  - neg psychiatric ROS     Infectious Disease:  - neg infectious disease ROS     Malignancy:  - neg malignancy ROS     Other:              Virtual visit -  No vitals were obtained    Physical Exam  Constitutional: Pleasant, no apparent distress, and appears stated age.  Eyes: Pupils equal  HENT: Normocephalic and atraumatic  Respiratory: Non labored breathing on room air  Neurologic: Awake, alert, oriented to name, place and time.   Neuropsychiatric: Calm, cooperative. Normal affect.       Labs / testing: (personally reviewed)    Outside records reviewed from:  Care Everywhere    Assessment  Yaritza Dias is a 34 year old female seen as a PAC referral for risk assessment and optimization for anesthesia.    Plan/Recommendations  Pt will be optimized for  "the proposed procedure.  See below for details on the assessment, risk, and preoperative recommendations    NEUROLOGY  - No history of TIA, CVA or seizure    -Post Op delirium risk factors:  No risk identified    ENT  - No current airway concerns.  Will need to be reassessed day of surgery.  Mallampati: Unable to assess  TM: Unable to assess    CARDIAC  - No history of CAD, Hypertension, and Afib  - Tachycardia  Review of mos recent vital signs shows HR between 115-136. Last EKG in 2021, sinus tachycardia. Per review of MFM note, plan is for patient to complete an EKG at next in-person [facility] visit. Patient states she has white coat syndrome.   - Patient is monitoring BP and HR at home regularly.   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 Borden, last visit 1/6/25  Treated with steroids + imuran + ursodiol at the time of diagnosis. Now on azathioprine only.  EGD 11/2024 without evidence of varices. Previous CT imaging without evidence of splenic artery aneurysm.  GI has recommended that patient repeat liver enzymes and CBC w/ diff every 2-3 months while pregnant, then every 4-6 weeks after delivery    PONV Medium Risk  Total Score: 2           1 AN PONV: Pt is Female    1 AN PONV: Patient is not a current smoker        /RENAL  - Baseline Creatinine  0.55    ENDOCRINE   - BMI: Estimated body mass index is 35.7 kg/m  as calculated from the following:    Height as of this encounter: 1.626 m (5' 4\").    Weight " as of this encounter: 94.3 kg (208 lb).  Obesity (BMI >30)  - No history of Diabetes Mellitus    HEME  VTE Low Risk 0.26%             Total Score: 0      - Platelet dysfunction secondary to Aspirin (Porsha, many others)  - Anemia  Baseline Hgb: 10.5-12.2  Recommend perioperative use of blood conservation techniques intraoperatively and close monitoring for postoperative bleeding.  - Chronic thrombocytopenia  Secondary to liver disease  Baseline Plts: 100-120  Discussed thrombocytopenia related to pregnancy and neuraxial anesthesia today. Baseline platelets are reassuring for possible epidural. Discussed that platelets may be checked at the time of admission and final decision will be per anesthesiologist working on the day of delivery. We also briefly discussed alternative pain management options. Patient expressed understanding.     MSK  Patient is NOT Frail             Total Score: 0          Please refer to the physical examination documented by the anesthesiologist in the anesthesia record on the day of surgery.    Video-Visit Details    Type of service:  Video Visit    Provider received verbal consent for a Video Visit from the patient? Yes   Video Start Time: 10:04 AM  Video End Time: 10:18 AM    Originating Location (pt. Location): Other work    Distant Location (provider location):  Off-site  Mode of Communication:  Video Conference via [employer]  On the day of service:     Prep time: 10 minutes  Visit time: 14 minutes  Documentation time: 11 minutes  ------------------------------------------  Total time: 35 minutes    Di Maurer PA-C    Preoperative Assessment Center  [facility]  Office phone: [phone]  Fax: [phone]

## 2025-03-12 NOTE — PROGRESS NOTES
Yaritza is a 34 year old who is being evaluated via a billable video visit.    How would you like to obtain your AVS? MyChart  If the video visit is dropped, the invitation should be resent by: Text to cell phone: [phone]      Subjective   Yaritza is a 34 year old, presenting for the following health issues:  Pre-Op Exam    HPI          Physical Exam

## 2025-03-12 NOTE — PROGRESS NOTES
Yaritza seen in clinic today for BPP and ob visit at 32w3d gestation for pregnancy complicated by history autoimmune hepatitis, thrombocytopenia, AMA, mrginal cord insertion (see report/notes). VSS. Pt continues to check BP and pulse at home and consistently 110s/70s with a pulse in the 90s. Pulse in clinic 112-122. Pt denies palpitations, CP, SOB. Pt unable to stay for recommended EKG today. Pt reports normal fetal movement. Pt denies vaginal bldg/lof/change in discharge/contractions/headache/vision changes/chest pain/SOB/edema. Pt evaluated for cardiac concerns,  labor, preeclampsia, fetal wellbeing without concerns. Patient reports some hip discomfort while sleeping at night. Sudha Beckman CNM met with patient. Dr. Laguna reviewed BPP. Pt had anesthesia consult this morning. Plan BPP in 1 week. Pt discharged stable and ambulatory.      Mariah Villar RN

## 2025-03-12 NOTE — PROGRESS NOTES
MFM Return OB visit  Subjective         Yaritza is here with Jose R for ROBV at 32 2/7 weeks. Feeling well! Reports regular fetal movement, denies uterine contractions, cramping, vaginal bleeding, leaking of fluid.  She denies HA, visual changes, epigastric pain.  She has notes some mild BLE edema at end of day when sitting for prolonged periods of time.  Denies facial or hand edema.  She continues to check BP at home and values range 110's/70's with pulse in 90's.  She is unable to complete her EKG today due to time constraints, is willing to complete at next OB visit.  Denies heart palpitations, SOB.  She is noting some soreness with sleeping, but otherwise feeling well.  Had BPP completed today, .  Anesthesia consult completed this AM, she denies any questions after that visit.                                           OB History    Para Term  AB Living   2 1 1 0 0 1   SAB IAB Ectopic Multiple Live Births         0 0 0 0 1             # Outcome Date GA Lbr Eric/2nd Weight Sex Type Anes PTL Lv   2 Current                     1 Term 21 38w6d 05:53 / 02:33 2.91 kg (6 lb 6.7 oz) F Vag-Spont   N MIKEL      Name: SNEHA PEPE      Apgar1: 8  Apgar5: 9      Current Facility-Administered Medications               Current Outpatient Medications   Medication Sig Dispense Refill    aspirin 81 MG EC tablet Take 81 mg by mouth daily.        azaTHIOprine (IMURAN) 50 MG tablet Take 1 tablet (50 mg) by mouth daily. 90 tablet 1    Prenatal Vit-DSS-Fe Cbn-FA (PRENATAL AD PO) Take by mouth.          No current facility-administered medications for this visit.            Objective    /85 (BP Location: Left arm, Patient Position: Chair, Cuff Size: Adult Regular)   Pulse 112   Resp 18   Wt 96.3 kg (212 lb 3.2 oz)   LMP 2024   SpO2 99%   BMI 36.42 kg/m         Gen:NAD.  Pleasant, articulate.  Abd:Gravid, non-tender  Ext:No edema     Ultrasound   See today's ultrasound report under the  "\"imaging\" tab.  Marginal cord insertion     MFM US COMPREHENSIVE SINGLE 12/13/2024  GA 19w5d. EFW 348g (79%). AC 69%. MVP 5.2 cm. Right urinary tract dilation A1 and marginal cord insertion. Suboptimal anatomy (RVOT, 3VT, Aortic arch, lips)     MFM US OB COMPLETE 2/3 TRI SINGLE 11/22/2024  GA 16w5d. EFW 170g (48%). AC 67%. MVP 3.4 cm. Normal suboptimal anatomy. Ductal arch, aortic arch and bicaval cardiac views poorly visualized.      MFM NUCHAL TRANSLUCENCY 10/22/2024   GA 12w5d. The nuchal translucency measurement is within the normal range.  The nasal bone was visualized. Visualized anatomy appropriate for early gestational age.        Other Imaging      MRI ABDOMEN 2/19/2022  1. Lobulated nodular liver parenchyma with capsular retraction and nodular areas of hypoenhancement, grossly stable from 7/3/2021 with no convincing acute MRI findings on the present study.  2. Splenomegaly. No ascites.  3. Additional incidental findings as described above.     MR LIVER 06/2023  1.  Similar appearance of the liver with a lobulated and nodular contour with areas of marked atrophy. No suspicious liver mass.  2.  Splenomegaly.     US ABDOMEN 12/4/2023   Cirrhotic appearing liver  No liver mass  Splenomegaly measuring 18 cm  No ascites     ECHOCARDIOGRAM COMPLETE 7/2021  Interpretation Summary  Global and regional left ventricular function is normal with an EF of 60-65%.  Right ventricular function, chamber size, wall motion, and thickness are  normal.  Pulmonary artery systolic pressure is normal.  The inferior vena cava is normal.  No pericardial effusion is present.  There is no prior study for direct comparison         Procedures      UPPER GI ENDOSCOPY 11/7/2024  FINDINGS:  The examined esophagus was normal.   There is no endoscopic evidence of varices in the entire esophagus.   The entire examined stomach was normal.   There is no endoscopic evidence of varices or portal hypertension gastropathy in the entire examined " stomach.   The examined duodenum was normal.   IMPRESSIONS:         Normal esophagus.   Normal stomach.   Normal examined duodenum.   No specimens collected.   RECOMMENDATIONS:  Discharge patient to home.   Resume previous diet.   Return to liver clinic.         Labs      GCT done : WNL      Assessment/Plan      Yaritza Dias is a 34 year old  at 32 2/7 by LMP, confirmed by US, here for follow-up OB visit.     Pregnancy complicated by:   Maternal Dx  - Autoimmune hepatitis with necrosis and resultant advanced fibrosis, no evidence of portal hypertension  - Splenomegaly with thrombocytopenia  - Chronic hypertension  - Previous pregnancy affected by fetal pulmonary valve stenosis requiring  balloon angioplasty.      Autoimmune Hepatitis (diagnosed in 2021)   Splenomegaly with Thrombocytopenia   Presented in 2021 with abnormal LFTs (  TBR 12.8 DBR 7.6 INR 1.6.) MRI with multiple liver cysts. 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 Started on Prednisone with Azathioprine added. No evidence of portal hypertension or esophageal varices on EGD 24   - Stable on azathioprine 50 mg daily; last flare over a year ago  - Last visit with Dr. Borden 25-see note Liver enzymes with CBC and diff every 2-3 months while pregnant then every 4-6 weeks after she delivers.  Continue Imuran daily, may try to wean again in the future.  Will check CBC with diff and LFT's again on 3/28/25.  - Persistent thrombocytopenia attributed to azathioprine and splenomegaly; last Plt level 114K on 25  - Review of previous documentation from Dr. Borden on 2023 mentioned screening for splenic artery aneurysm prior to getting pregnant; previous imaging reviewed at their tumor board - sufficient without concern, no further imaging indicated     Chronic hypertension   - remains asymptomatic; not currently on antihypertensive medication. Suspect possible white  coat HTN.  - monitoring Bps at home with home blood pressure cuff. All Bps at goal and < 140/90 with normal HR. Continue home monitoring.  - Reviewed warning signs/symptoms of preeclampsia   - on low dose daily aspirin for preeclampsia prevention  - Goal BP <140/90; reviewed that if antihypertensive medications are to be initiated for elevated BPs, then would need to first rule out superimposed preeclampsia by getting preeclampsia labs  - Instructed to call with any elevations >140/90 to initiate PO antihypertensive medication; preferably labetalol given her tachycardia      Tachycardia  - asymptomatic; HR improves at end of office visits; home heart rates in the 80s-90s  - TSH collected on  normal  - possible association with anxiety in the clinic setting   -EKG to be completed at next OB visit.     Fetal urinary tract dilation A1-> now A2  - reviewed ultrasound findings with patient   - will continue to assess with ongoing ultrasound surveillance  - if persistent urinary tract dilation at 32 weeks (>7mm), then will place referral for pediatric urology     Marginal cord insertion  - reviewed ultrasound finding with patient  - will continue serial fetal growth ultrasound assessments      Previous pregnancy affected by fetal congenital heart anomaly   - Fetal Echocardiogram completed      Surveillance  - Initiate serial growth US every 4 weeks starting at 28 weeks  - Weekly BPP starting at 32 weeks      Delivery planning  - Anesthesia consult completed today--continue to monitor thrombocytopenia--plans epidural  - Delivery timing to be discussed with patient as pregnancy progresses   - Feeding: bottle feeding with formula  - Contraception plans: discuss at future visit     Routine PNC   - Prenatal labs 10/15/2024:  Rh pos (B pos), antibody negative  Hgb 13.2 MCV 87 Plt 115K               HepBAg/HIV/RPR/HepCAb: negative               GC/CT: negative               Rubella: immune                 UC:  10,000-50,000 CFU/mL Mixture of urogenital danna               Hgb A1c 4.9%               GCT: 122 on 1/17/25  - Pap smear: NILM, HPV neg  (10/15/2024)  - Aneuploidy screening: low risk NIPT  (10/22/2024)  - GBS at 36 weeks  -Return to clinic for ROBV in 2 weeks.    CATRACHITO Darling, BASSEM  30 minutes on the date of the encounter doing chart review, review of test results, interpretation of tests, patient visit, and documentation

## 2025-03-13 NOTE — PROGRESS NOTES
The patient was seen for an ultrasound in the Maternal-Fetal Medicine Center at the [facility] today.  For a detailed report of the ultrasound examination, please see the ultrasound report which can be found under the imaging tab.    If you have questions regarding today's evaluation or if we can be of further service, please contact the Maternal-Fetal Medicine Center.    Simona Laguna MD  , OB/GYN  Maternal-Fetal Medicine  [phone] (Pager)

## 2025-03-21 ENCOUNTER — HOSPITAL ENCOUNTER (OUTPATIENT)
Dept: ULTRASOUND IMAGING | Facility: CLINIC | Age: 35
Discharge: HOME | End: 2025-03-21
Attending: OBSTETRICS & GYNECOLOGY
Payer: COMMERCIAL

## 2025-03-21 DIAGNOSIS — R00.0 TACHYCARDIA: ICD-10-CM

## 2025-03-21 DIAGNOSIS — D69.6 THROMBOCYTOPENIA: ICD-10-CM

## 2025-03-21 DIAGNOSIS — O26.92 PREGNANCY RELATED CONDITION IN SECOND TRIMESTER: ICD-10-CM

## 2025-03-21 PROCEDURE — 76819 FETAL BIOPHYS PROFIL W/O NST: CPT

## 2025-03-21 PROCEDURE — 76819 FETAL BIOPHYS PROFIL W/O NST: CPT | Mod: 26 | Performed by: OBSTETRICS & GYNECOLOGY

## 2025-03-28 ENCOUNTER — HOSPITAL ENCOUNTER (OUTPATIENT)
Dept: ULTRASOUND IMAGING | Facility: CLINIC | Age: 35
Discharge: HOME | End: 2025-03-28
Attending: OBSTETRICS & GYNECOLOGY
Payer: COMMERCIAL

## 2025-03-28 DIAGNOSIS — R00.0 TACHYCARDIA: ICD-10-CM

## 2025-03-28 DIAGNOSIS — D69.6 THROMBOCYTOPENIA: ICD-10-CM

## 2025-03-28 DIAGNOSIS — O26.92 PREGNANCY RELATED CONDITION IN SECOND TRIMESTER: ICD-10-CM

## 2025-03-28 PROBLEM — O26.613: Status: ACTIVE | Noted: 2025-03-28

## 2025-03-28 PROCEDURE — 76819 FETAL BIOPHYS PROFIL W/O NST: CPT

## 2025-03-28 PROCEDURE — 93005 ELECTROCARDIOGRAM TRACING: CPT

## 2025-04-03 ENCOUNTER — HOSPITAL ENCOUNTER (OUTPATIENT)
Dept: ULTRASOUND IMAGING | Facility: CLINIC | Age: 35
Discharge: HOME | End: 2025-04-03
Attending: OBSTETRICS & GYNECOLOGY
Payer: COMMERCIAL

## 2025-04-03 ENCOUNTER — OFFICE VISIT (OUTPATIENT)
Dept: MATERNAL FETAL MEDICINE | Facility: CLINIC | Age: 35
End: 2025-04-03
Attending: OBSTETRICS & GYNECOLOGY
Payer: COMMERCIAL

## 2025-04-03 VITALS
SYSTOLIC BLOOD PRESSURE: 122 MMHG | RESPIRATION RATE: 18 BRPM | OXYGEN SATURATION: 98 % | DIASTOLIC BLOOD PRESSURE: 82 MMHG | HEART RATE: 120 BPM

## 2025-04-03 VITALS — BODY MASS INDEX: 36.72 KG/M2 | WEIGHT: 213.9 LBS

## 2025-04-03 DIAGNOSIS — O26.92 PREGNANCY RELATED CONDITION IN SECOND TRIMESTER: ICD-10-CM

## 2025-04-03 DIAGNOSIS — O09.529 SUPERVISION OF HIGH-RISK PREGNANCY OF ELDERLY MULTIGRAVIDA: Primary | ICD-10-CM

## 2025-04-03 DIAGNOSIS — O26.613: Primary | ICD-10-CM

## 2025-04-03 DIAGNOSIS — R00.0 TACHYCARDIA: ICD-10-CM

## 2025-04-03 DIAGNOSIS — D69.6 THROMBOCYTOPENIA: ICD-10-CM

## 2025-04-03 DIAGNOSIS — Z86.32 HISTORY OF DIET CONTROLLED GESTATIONAL DIABETES MELLITUS (GDM): ICD-10-CM

## 2025-04-03 DIAGNOSIS — O09.523 MULTIGRAVIDA OF ADVANCED MATERNAL AGE IN THIRD TRIMESTER: ICD-10-CM

## 2025-04-03 PROBLEM — N20.1 URETERAL STONE: Status: RESOLVED | Noted: 2024-05-20 | Resolved: 2025-04-03

## 2025-04-03 PROBLEM — Z12.4 SCREENING FOR CERVICAL CANCER: Status: RESOLVED | Noted: 2024-10-22 | Resolved: 2025-04-03

## 2025-04-03 PROCEDURE — 99215 OFFICE O/P EST HI 40 MIN: CPT | Mod: 25 | Performed by: ADVANCED PRACTICE MIDWIFE

## 2025-04-03 PROCEDURE — 76819 FETAL BIOPHYS PROFIL W/O NST: CPT

## 2025-04-03 RX ORDER — IBUPROFEN 600 MG/1
600 TABLET, FILM COATED ORAL EVERY 6 HOURS PRN
Qty: 60 TABLET | Refills: 0 | Status: SHIPPED | OUTPATIENT
Start: 2025-04-03

## 2025-04-03 RX ORDER — AMOXICILLIN 250 MG
1 CAPSULE ORAL DAILY
Qty: 100 TABLET | Refills: 0 | Status: SHIPPED | OUTPATIENT
Start: 2025-04-03

## 2025-04-03 RX ORDER — ACETAMINOPHEN 325 MG/1
650 TABLET ORAL EVERY 6 HOURS PRN
Qty: 100 TABLET | Refills: 0 | Status: SHIPPED | OUTPATIENT
Start: 2025-04-03

## 2025-04-03 NOTE — NURSING NOTE
Yaritza seen in clinic today for BPP and OB visit at 35w4d gestation. VSS. Pt reports normal fetal movement, see flowsheet. Pt evaluated for  labor, preeclampsia, vaginal bleeding, infection, fetal wellbeing and cardiac symptoms without concerns. Pt denies bleeding/lof/change in vaginal discharge/contractions/headache/vision changes/chest pain/SOB/edema. Pt notified to review new pt education in AVS, verbally reviewed highlights. SBAR given to Mary Jane Mtz CNM who met with pt and discussed POC. Plan for return  for BPP/OBV as previously scheduled. Hepatology labs and GBS at that visit. Pt discharged stable and ambulatory.

## 2025-04-03 NOTE — PATIENT INSTRUCTIONS
Weeks 34 to 36 of Your Pregnancy: Care Instructions  Your belly has grown quite large. It's almost time to give birth! Your baby's lungs are almost ready to breathe air. The skull bones are firm enough to protect your baby's head. But they're soft enough to move down through the birth canal.    You might be wondering what to expect during labor. Because each birth is different, there's no way to know exactly what childbirth will be like for you. Talk to your doctor or midwife about any concerns you have.   You'll probably have a test for group B streptococcus (GBS). GBS is bacteria that can live in the vagina and rectum. GBS can make your baby sick after birth. If you test positive, you'll get antibiotics during labor.     Choose what type of pain relief you would like during labor.  You can choose from a few types, including medicine and non-medicine options. You may want to use several types of pain relief.     Know how medicines can help with pain during labor.  Some medicines lower anxiety and help with some of the pain. Others make your lower body numb so that you will feel less pain.     Tell your doctor about your pain medicine choice.  Do this before you start labor or very early in your labor. You may be able to change your mind during labor.     Learn about the stages of labor.    The first stage includes the early (latent) and active phases of labor. Contractions start in early labor. During active labor, contractions get stronger, last longer, and happen more often. And the cervix opens more rapidly.  The second stage starts when you're ready to push. During this stage, your baby is born.  During the third stage, you'll usually have a few more contractions to push out the placenta.   Follow-up care is a key part of your treatment and safety. Be sure to make and go to all appointments, and call your doctor if you are having problems. It's also a good idea to know your test results and keep a list of the  "medicines you take.  Where can you learn more?  Go to https://www.[employer].net/patiented  Enter B912 in the search box to learn more about \"Weeks 34 to 36 of Your Pregnancy: Care Instructions.\"  Current as of: 2024  Content Version: 14.4    3711-7916 [employer].   Care instructions adapted under license by your healthcare professional. If you have questions about a medical condition or this instruction, always ask your healthcare professional. [employer] disclaims any warranty or liability for your use of this information.    Group B Strep During Pregnancy: Care Instructions  Overview     Group B strep infection is caused by a type of bacteria. It's a different kind of bacteria than the kind that causes strep throat.  You may have this kind of bacteria in your body. Sometimes it may cause an infection, but most of the time it doesn't make you sick or cause symptoms. But if you pass the bacteria to your baby during the birth, it can cause serious health problems for your baby.  If you have this bacteria in your body, you will get antibiotics when you are in labor. Antibiotics help prevent problems for a  baby.  After birth, doctors will watch and may test your baby. If your baby tests positive for Group B strep, your baby will get antibiotics.  If you plan to breastfeed your baby, don't worry. It will be safe to breastfeed.  Follow-up care is a key part of your treatment and safety. Be sure to make and go to all appointments, and call your doctor if you are having problems. It's also a good idea to know your test results and keep a list of the medicines you take.  How can you care for yourself at home?  If your doctor has prescribed antibiotics, take them as directed. Do not stop taking them just because you feel better. You need to take the full course of antibiotics.  Tell your doctor if you are allergic to any antibiotic.  If you go into labor, or your water breaks, go to " "the hospital. Your doctor will give you antibiotics to help protect your baby from infection.  Tell the doctors and nurses if you have an allergy to penicillin.  Tell the doctors and nurses at the hospital that you tested positive for group B strep.  When should you call for help?   Call your doctor now or seek immediate medical care if:    You have symptoms of a urinary tract infection. These may include:  Pain or burning when you urinate.  A frequent need to urinate without being able to pass much urine.  Pain in the flank, which is just below the rib cage and above the waist on either side of the back.  Blood in your urine.  A fever.     You think you are in labor or your water has broken.     You have pain in your belly or pelvis.   Watch closely for changes in your health, and be sure to contact your doctor if you have any problems.  Where can you learn more?  Go to https://www.[employer]/patiented  Enter M001 in the search box to learn more about \"Group B Strep During Pregnancy: Care Instructions.\"  Current as of: April 30, 2024  Content Version: 14.4    6314-8681 [employer].   Care instructions adapted under license by your healthcare professional. If you have questions about a medical condition or this instruction, always ask your healthcare professional. [employer] disclaims any warranty or liability for your use of this information.    "

## 2025-04-03 NOTE — PROGRESS NOTES
"Please see \"Imaging\" tab under \"Chart Review\" for details of today's visit.    Brenda Valles MD    " Patient is returning your call, she stated that she will be available for the next 30 minutes.  Thank you.

## 2025-04-03 NOTE — PROGRESS NOTES
"Maternal fetal Medicine OB Follow up visit.     Yaritza Pepe  : 1990  MRN: [id number]    CC: OB Follow-up    Subjective:  Yaritza Pepe is a 34 year old  at 35w4d presenting for routine OB follow-up. Today, she is here w Luke    Patient denies regular, painful contractions, denies loss of fluid or vaginal bleeding.  Reports fetal movement.      Patient also denies any recent fevers/chills, headaches or changes in vision, RUQ pain, nausea or vomiting, constipation, diarrhea or other systemic symptoms.      OB Hx:  OB History    Para Term  AB Living   2 1 1 0 0 1   SAB IAB Ectopic Multiple Live Births   0 0 0 0 1      # Outcome Date GA Lbr Eric/2nd Weight Sex Type Anes PTL Lv   2 Current            1 Term 21 38w6d 05:53 / 02:33 2.91 kg (6 lb 6.7 oz) F Vag-Spont  N MIKEL      Birth Comments: IOL for chronic HTN, EBL 1000mL      Name: SNEHA PEPE      Apgar1: 8  Apgar5: 9         Objective:  LMP 2024   /82 (BP Location: Left arm, Patient Position: Sitting, Cuff Size: Adult Regular)   Pulse 120   Resp 18   LMP 2024   SpO2 98%     Gen: alert, oriented, NAD  Skin: warm, dry, intact  Respiratory: breathing nonlabored, no SOB  Extremities: wnl  Psych: mood stable,       OB Ultrasound:  BPP today:  today  Please see \"imaging\" tab under chart review for today's ultrasound results.      Assessment/Plan:  34 year old  at 35w4d here for follow up OB visit.    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6.) MRI with multiple liver cysts. 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 Started on Prednisone with Azathioprine added. No evidence of portal hypertension or esophageal varices on EGD 11/7/24   - Stable on azathioprine 50 mg daily; last flare over a year ago  - Last visit with Dr. Borden 1/6/25-see note Liver enzymes with CBC and diff every 2-3 months while pregnant then every 4-6 weeks after she delivers.  Continue Imuran daily, may try to wean again in the future.  Will check CBC with diff and LFT's again at 36w OB office visit, check again on admission for delivery  - Persistent thrombocytopenia attributed to azathioprine and splenomegaly; last Plt level 114K on 2/28/25  - Review of previous documentation from Dr. Borden on 6/2023 mentioned screening for splenic artery aneurysm prior to getting pregnant; previous imaging reviewed at their tumor board - sufficient without concern, no further imaging indicated     #Chronic hypertension   - remains asymptomatic; not currently on antihypertensive medication. Suspect possible white coat HTN.  - monitoring Bps at home with home blood pressure cuff. All Bps at goal and < 140/90 with normal HR at home. Continue home monitoring.  - Reviewed warning signs/symptoms of preeclampsia   - on low dose daily aspirin for preeclampsia prevention  - Goal BP <140/90; reviewed that if antihypertensive medications are to be initiated for elevated BPs, then would need to first rule out superimposed preeclampsia by getting preeclampsia labs  - Instructed to call with any elevations >140/90 to initiate PO antihypertensive medication; preferably labetalol given her tachycardia      #Tachycardia  -  asymptomatic; HR improves at end of office visits; home heart rates in the 80s-90s  - TSH collected on  normal  - Likely association with anxiety in the clinic setting, her anxiety increases in the medical setting due to her extensive Liver history    - EKG completed today which showed sinus tachycardia      #Anemia   - Discussed starting OTC iron supplement every other day and taking with orange juice  - Discussed possible constipation side effect, recommended increased hydration and stool softners PRN      #Fetal urinary tract dilation A1-> now A2  - resolved       #Marginal cord insertion  - previously reviewed ultrasound finding with patient  - will continue serial fetal growth ultrasound assessments      #Previous pregnancy affected by fetal congenital heart anomaly (pulmonary valve stenosis)  - Fetal Echocardiogram completed and wnl      # Surveillance  - Growth US every 4 weeks until delivery  - Weekly BPP until delivery       #Delivery planning  - S/p Anesthesia consult--continue to monitor thrombocytopenia--plans epidural  - Delivery timing to be 39 0/7 weeks with induction of labor - scheduled  0730.   - Feeding: bottle feeding with formula  - Contraception plans: Desires condoms and natural family planning   planning vasectomy      #Routine PNC   - Prenatal labs 10/15/2024:   Rh pos (B pos), antibody negative   Hgb 13.2 MCV 87 Plt 115K               HepBAg/HIV/RPR/HepCAb: negative               GC/CT: negative               Rubella: immune                 UC: 10,000-50,000 CFU/mL Mixture of urogenital danna               Hgb A1c 4.9%               GCT: 122 on 25  - Pap smear: NILM, HPV neg  (10/15/2024)  - Aneuploidy screening: low risk NIPT  (10/22/2024)  - S/p Tdap vaccine   - GBS at 36 weeks, Add labs to 36wk CBC: varicella, hep B antibody,vit D, GC/chlam    OTC postpartum panel sent to her pharm: acetaminophen, ibuprofen, colace    Discussed some routine practices  at [facility]      RTC in 1    40 minutes spent on the date of the encounter, doing chart review, history and exam, documentation and further activities as noted.      Mary Jane Mtz, CATRACHITO LUEVANO on 4/3/2025 at 11:02 AM

## 2025-04-07 ENCOUNTER — HOSPITAL ENCOUNTER (OUTPATIENT)
Dept: ULTRASOUND IMAGING | Facility: CLINIC | Age: 35
Discharge: HOME | End: 2025-04-07
Attending: OBSTETRICS & GYNECOLOGY
Payer: COMMERCIAL

## 2025-04-07 ENCOUNTER — OFFICE VISIT (OUTPATIENT)
Dept: MATERNAL FETAL MEDICINE | Facility: CLINIC | Age: 35
End: 2025-04-07
Attending: ADVANCED PRACTICE MIDWIFE
Payer: COMMERCIAL

## 2025-04-07 ENCOUNTER — OFFICE VISIT (OUTPATIENT)
Dept: MATERNAL FETAL MEDICINE | Facility: CLINIC | Age: 35
End: 2025-04-07
Attending: OBSTETRICS & GYNECOLOGY
Payer: COMMERCIAL

## 2025-04-07 VITALS
HEART RATE: 122 BPM | WEIGHT: 213.5 LBS | DIASTOLIC BLOOD PRESSURE: 82 MMHG | RESPIRATION RATE: 20 BRPM | SYSTOLIC BLOOD PRESSURE: 120 MMHG | OXYGEN SATURATION: 98 % | BODY MASS INDEX: 36.65 KG/M2

## 2025-04-07 DIAGNOSIS — O09.93 SUPERVISION OF HIGH RISK PREGNANCY IN THIRD TRIMESTER: Primary | ICD-10-CM

## 2025-04-07 DIAGNOSIS — O09.523 MULTIGRAVIDA OF ADVANCED MATERNAL AGE IN THIRD TRIMESTER: Primary | ICD-10-CM

## 2025-04-07 DIAGNOSIS — D69.6 THROMBOCYTOPENIA: ICD-10-CM

## 2025-04-07 DIAGNOSIS — O26.92 PREGNANCY RELATED CONDITION IN SECOND TRIMESTER: ICD-10-CM

## 2025-04-07 DIAGNOSIS — K75.4 AUTOIMMUNE HEPATITIS (H): ICD-10-CM

## 2025-04-07 DIAGNOSIS — R00.0 TACHYCARDIA: ICD-10-CM

## 2025-04-07 LAB
ALBUMIN SERPL BCG-MCNC: 3.3 G/DL (ref 3.5–5.2)
ALP SERPL-CCNC: 87 U/L (ref 40–150)
ALT SERPL W P-5'-P-CCNC: 9 U/L (ref 0–50)
AST SERPL W P-5'-P-CCNC: 15 U/L (ref 0–45)
BILIRUB DIRECT SERPL-MCNC: 0.17 MG/DL (ref 0–0.3)
BILIRUB SERPL-MCNC: 0.5 MG/DL
ERYTHROCYTE [DISTWIDTH] IN BLOOD BY AUTOMATED COUNT: 15.1 % (ref 10–15)
HBV SURFACE AB SERPL IA-ACNC: 319 M[IU]/ML
HBV SURFACE AB SERPL IA-ACNC: REACTIVE M[IU]/ML
HCT VFR BLD AUTO: 30.1 % (ref 35–47)
HGB BLD-MCNC: 9.8 G/DL (ref 11.7–15.7)
MCH RBC QN AUTO: 26.3 PG (ref 26.5–33)
MCHC RBC AUTO-ENTMCNC: 32.6 G/DL (ref 31.5–36.5)
MCV RBC AUTO: 81 FL (ref 78–100)
PLATELET # BLD AUTO: 120 10E3/UL (ref 150–450)
PROT SERPL-MCNC: 5.7 G/DL (ref 6.4–8.3)
RBC # BLD AUTO: 3.73 10E6/UL (ref 3.8–5.2)
WBC # BLD AUTO: 7.3 10E3/UL (ref 4–11)

## 2025-04-07 PROCEDURE — 36415 COLL VENOUS BLD VENIPUNCTURE: CPT | Performed by: ADVANCED PRACTICE MIDWIFE

## 2025-04-07 PROCEDURE — 85048 AUTOMATED LEUKOCYTE COUNT: CPT | Performed by: ADVANCED PRACTICE MIDWIFE

## 2025-04-07 PROCEDURE — 87653 STREP B DNA AMP PROBE: CPT | Performed by: ADVANCED PRACTICE MIDWIFE

## 2025-04-07 PROCEDURE — 86706 HEP B SURFACE ANTIBODY: CPT | Performed by: ADVANCED PRACTICE MIDWIFE

## 2025-04-07 PROCEDURE — 99211 OFF/OP EST MAY X REQ PHY/QHP: CPT | Mod: 25 | Performed by: ADVANCED PRACTICE MIDWIFE

## 2025-04-07 PROCEDURE — 80076 HEPATIC FUNCTION PANEL: CPT | Performed by: ADVANCED PRACTICE MIDWIFE

## 2025-04-07 PROCEDURE — 76819 FETAL BIOPHYS PROFIL W/O NST: CPT

## 2025-04-07 PROCEDURE — 85014 HEMATOCRIT: CPT | Performed by: ADVANCED PRACTICE MIDWIFE

## 2025-04-07 ASSESSMENT — PAIN SCALES - GENERAL: PAINLEVEL_OUTOF10: NO PAIN (0)

## 2025-04-07 NOTE — PROGRESS NOTES
Please refer to ultrasound report under 'Imaging' Studies of 'Chart Review' tabs.    Jeffry Gentile M.D.

## 2025-04-07 NOTE — NURSING NOTE
Yaritza seen in clinic today for F/U OB visit and BPP at 36w1d gestation. VSS. Pt reports + fetal movement, see flowsheet. Pt evaluated for  labor, preeclampsia, vaginal bleeding, infection, fetal wellbeing without concerns. BPP .  Pt denies bleeding/lof/change in vaginal discharge/contractions/headache/vision changes/chest pain/SOB/edema. Pt notified to review new pt education in AVS, verbally reviewed highlights. SBAR given to ERIN Jacome who met with pt and discussed POC. Plan for pt to have labs today and GBS done. Pt has weekly apts and these were scheduled. . Future visits scheduled at . Pt discharged stable and ambulatory.

## 2025-04-07 NOTE — PROGRESS NOTES
"Maternal fetal Medicine OB Follow up visit.     Yaritza Pepe  : 1990  MRN: [id number]    CC: OB Follow-up    Subjective:  Yaritza Pepe is a 34 year old  at 36w1d presenting for routine OB follow-up. Today, she is feeling well. Offers minimal concerns at this time. Reports fetal movement. Denies regular, painful contractions, denies loss of fluid, vaginal bleeding, or unusual vaginal discharge.      Patient also denies any recent fevers/chills, headaches or changes in vision, RUQ pain, nausea or vomiting, constipation, diarrhea or other systemic symptoms. Checks her BP at home nightly and all values WNL.    Has her IOL scheduled for . Does not have many questions about this, but does report that her last induction was long (about 36 hours) and did not enjoy use of a jay balloon during that process.    OB Hx:  OB History    Para Term  AB Living   2 1 1 0 0 1   SAB IAB Ectopic Multiple Live Births   0 0 0 0 1      # Outcome Date GA Lbr Eric/2nd Weight Sex Type Anes PTL Lv   2 Current            1 Term 21 38w6d 05:53 / 02:33 2.91 kg (6 lb 6.7 oz) F Vag-Spont  N MIKEL      Birth Comments: IOL for chronic HTN, EBL 1000mL      Name: SNEHA PEPE      Apgar1: 8  Apgar5: 9         Objective:  /84 (BP Location: Left arm, Patient Position: Sitting, Cuff Size: Adult Large)   Pulse 120   Resp 20   Wt 96.8 kg (213 lb 8 oz)   LMP 2024   SpO2 98%   BMI 36.65 kg/m      Gen: alert, oriented, NAD  Skin: warm, dry, intact  Respiratory: breathing unlabored, no SOB  Abdominal: gravid, non-tender  Pelvic: declines  Extremities: WNL  Psych: mood WNL, behavior WNL      OB Ultrasound:  Please see \"imaging\" tab under chart review for today's ultrasound results.      Assessment/Plan:  34 year old  at 36w1d here for follow up OB visit.    Pregnancy has been complicated by:   Maternal Dx  - history of Autoimmune hepatitis with necrosis and resultant advanced " fibrosis, no evidence of portal hypertension  - Splenomegaly with thrombocytopenia  - Chronic hypertension  - Previous pregnancy affected by fetal pulmonary valve stenosis requiring  balloon angioplasty  - Mild anemia   - Tachycardia    - H/o PPH (1L EBL, no blood transfusion required)   - H/o GDM      Fetal Dx:   - Formerly Urinary tract dilation A2-3 (UTD A2-3) - resolved (2025)  - Marginal cord insertion  - Breech presentation 3/28/2025 - CEPHALIC on 25         #Autoimmune Hepatitis (diagnosed in 2021)   #Splenomegaly with Thrombocytopenia   Presented in 2021 with abnormal LFTs (  TBR 12.8 DBR 7.6 INR 1.6.) MRI with multiple liver cysts. 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 Started on Prednisone with Azathioprine added. No evidence of portal hypertension or esophageal varices on EGD 24   - Stable on azathioprine 50 mg daily; last flare over a year ago  - Last visit with Dr. Borden 25-see note Liver enzymes with CBC and diff every 2-3 months while pregnant then every 4-6 weeks after she delivers.  Continue Imuran daily, may try to wean again in the future.  Will check CBC with diff and LFT's again at 36w OB office visit, check again on admission for delivery  - Persistent thrombocytopenia attributed to azathioprine and splenomegaly; level today stable at 120.  - Review of previous documentation from Dr. Borden on 2023 mentioned screening for splenic artery aneurysm prior to getting pregnant; previous imaging reviewed at their tumor board - sufficient without concern, no further imaging indicated     #Chronic hypertension   - remains asymptomatic; not currently on antihypertensive medication.   - monitoring Bps at home with home blood pressure cuff. All Bps at goal and < 140/90 with normal HR at home. Continue home monitoring.  - Reviewed warning signs/symptoms of preeclampsia   - on low dose daily aspirin for preeclampsia  prevention  - Goal BP <140/90; reviewed that if antihypertensive medications are to be initiated for elevated BPs, then would need to first rule out superimposed preeclampsia by getting preeclampsia labs  - Instructed to call with any elevations >140/90 to initiate PO antihypertensive medication; preferably labetalol given her tachycardia      #Tachycardia  - asymptomatic; HR improves at end of office visits; home heart rates in the 80s-90s  - TSH collected on  normal  - Likely association with anxiety in the clinic setting, her anxiety increases in the medical setting due to her extensive Liver history    - EKG completed 3/28 which showed sinus tachycardia      #Anemia   - Discussed starting OTC iron supplement every other day and taking with orange juice  - Discussed possible constipation side effect, recommended increased hydration and stool softners PRN      #Fetal urinary tract dilation A1-> now A2  - resolved       #Marginal cord insertion  - previously reviewed ultrasound finding with patient  - will continue serial fetal growth ultrasound assessments      #Previous pregnancy affected by fetal congenital heart anomaly (pulmonary valve stenosis)  - Fetal Echocardiogram completed and wnl      # Surveillance  - Growth US every 4 weeks until delivery  - Weekly BPP until delivery       #Delivery planning  - S/p Anesthesia consult--continue to monitor thrombocytopenia--plans epidural  - Delivery timing to be 39 0/7 weeks with induction of labor - scheduled  0730.   - Reviewed typical IOL methods today. Discussed that balloon dilation may or may not be recommended and that she can start with other options first if wanting to avoid balloon as best as possible.   - Feeding: bottle feeding with formula  - Contraception plans: Desires condoms and natural family planning.  planning vasectomy      #Routine PNC   - Prenatal labs 10/15/2024:   Rh pos (B pos), antibody negative   Hgb 13.2 MCV 87  Plt 115K               HepBAg/HIV/RPR/HepCAb: negative               GC/CT: negative               Rubella: immune                 UC: 10,000-50,000 CFU/mL Mixture of urogenital danna               Hgb A1c 4.9%               GCT: 122 on 1/17/25  - Pap smear: NILM, HPV neg  (10/15/2024)  - Aneuploidy screening: low risk NIPT  (10/22/2024)  - S/p Tdap vaccine 2/28  - GBS today. Add labs to 36wk CBC: varicella, hep B antibody, vit D, GC/chlam. Patient reports hx of chicken pox as a child and declines vit D and GC/CT labs today.           RTC in 1    45 minutes spent on the date of the encounter, doing chart review, history and exam, documentation and further activities as noted.      Janay Jacome CNM on 4/7/2025 at 10:50 AM

## 2025-04-08 ENCOUNTER — MYC REFILL (OUTPATIENT)
Dept: GASTROENTEROLOGY | Facility: CLINIC | Age: 35
End: 2025-04-08
Payer: COMMERCIAL

## 2025-04-08 DIAGNOSIS — K75.4 AUTOIMMUNE HEPATITIS (H): ICD-10-CM

## 2025-04-08 LAB — GP B STREP DNA SPEC QL NAA+PROBE: NEGATIVE

## 2025-04-08 RX ORDER — AZATHIOPRINE 50 MG/1
50 TABLET ORAL DAILY
Qty: 90 TABLET | Refills: 3 | Status: SHIPPED | OUTPATIENT
Start: 2025-04-08

## 2025-04-15 ENCOUNTER — OFFICE VISIT (OUTPATIENT)
Dept: MATERNAL FETAL MEDICINE | Facility: CLINIC | Age: 35
End: 2025-04-15
Attending: OBSTETRICS & GYNECOLOGY
Payer: COMMERCIAL

## 2025-04-15 ENCOUNTER — HOSPITAL ENCOUNTER (OUTPATIENT)
Dept: ULTRASOUND IMAGING | Facility: CLINIC | Age: 35
Discharge: HOME | End: 2025-04-15
Attending: OBSTETRICS & GYNECOLOGY
Payer: COMMERCIAL

## 2025-04-15 VITALS
WEIGHT: 215.1 LBS | DIASTOLIC BLOOD PRESSURE: 87 MMHG | OXYGEN SATURATION: 98 % | HEART RATE: 117 BPM | RESPIRATION RATE: 18 BRPM | SYSTOLIC BLOOD PRESSURE: 129 MMHG | BODY MASS INDEX: 36.92 KG/M2

## 2025-04-15 DIAGNOSIS — D69.6 THROMBOCYTOPENIA: ICD-10-CM

## 2025-04-15 DIAGNOSIS — O09.523 MULTIGRAVIDA OF ADVANCED MATERNAL AGE IN THIRD TRIMESTER: ICD-10-CM

## 2025-04-15 DIAGNOSIS — R00.0 TACHYCARDIA: ICD-10-CM

## 2025-04-15 DIAGNOSIS — O09.93 SUPERVISION OF HIGH RISK PREGNANCY IN THIRD TRIMESTER: Primary | ICD-10-CM

## 2025-04-15 DIAGNOSIS — O26.92 PREGNANCY RELATED CONDITION IN SECOND TRIMESTER: ICD-10-CM

## 2025-04-15 DIAGNOSIS — K75.4 AUTOIMMUNE HEPATITIS (H): ICD-10-CM

## 2025-04-15 PROCEDURE — 99215 OFFICE O/P EST HI 40 MIN: CPT | Mod: 25 | Performed by: ADVANCED PRACTICE MIDWIFE

## 2025-04-15 PROCEDURE — 76819 FETAL BIOPHYS PROFIL W/O NST: CPT

## 2025-04-15 RX ORDER — FERROUS SULFATE 325(65) MG
325 TABLET ORAL
COMMUNITY

## 2025-04-15 ASSESSMENT — PATIENT HEALTH QUESTIONNAIRE - PHQ9: SUM OF ALL RESPONSES TO PHQ QUESTIONS 1-9: 0

## 2025-04-15 NOTE — PROGRESS NOTES
The patient was seen for an ultrasound in the Maternal[facility]Fetal Medicine Center at the [facility] today.  For a detailed report of the ultrasound examination, please see the ultrasound report which can be found under the imaging tab.    If you have questions regarding today's evaluation or if we can be of further service, please contact the Maternal-Fetal Medicine Center.    Simona Laguna MD  , OB/GYN  Maternal-Fetal Medicine  [phone] (Pager)

## 2025-04-15 NOTE — NURSING NOTE
Yaritza seen in clinic today for OB visit at 37w2d gestation. VSS. Pt reports good fetal movement, see flowsheet. Pt evaluated for  labor, preeclampsia, vaginal bleeding, infection, fetal wellbeing  without concerns. Pt denies bleeding/lof/change in vaginal discharge/contractions/headache/vision changes/chest pain/SOB/edema. Pt notified to review new pt education in AVS, verbally reviewed highlights. SBAR given to Janay LUEVANO who met with pt and discussed POC. Plan for growth US/BPP next week. Future visits scheduled at . Pt discharged stable and ambulatory.

## 2025-04-15 NOTE — PROGRESS NOTES
"Maternal fetal Medicine OB Follow up visit.     Yaritza Pepe  : 1990  MRN: [id number]    CC: OB Follow-up    Subjective:  Yaritza Pepe is a 34 year old  at 37w2d presenting for routine OB follow-up. Today, she is here with her , Jose R, and is feeling well overall. She offers no concerns today. Denies contractions, loss of fluid, vaginal bleeding, or unusual discharge. Reports fetal movement. Continues to check BP at home each evening and numbers are 110s/70.     OB Hx:  OB History    Para Term  AB Living   2 1 1 0 0 1   SAB IAB Ectopic Multiple Live Births   0 0 0 0 1      # Outcome Date GA Lbr Eric/2nd Weight Sex Type Anes PTL Lv   2 Current            1 Term 21 38w6d 05:53 / 02:33 2.91 kg (6 lb 6.7 oz) F Vag-Spont Nitrous, Other N MIKEL      Birth Comments: IOL for chronic HTN, EBL 1000mL      Name: SNEHA PEPE      Apgar1: 8  Apgar5: 9         Objective:  /87   Pulse 117   Resp 18   Wt 97.6 kg (215 lb 1.6 oz)   LMP 2024   SpO2 98%   BMI 36.92 kg/m    Gen: alert, oriented, NAD  Skin: warm, dry, intact  Respiratory: breathing unlabored, no SOB  Abdominal: gravid, non-tender  Pelvic: deferred   Extremities: WNL  Psych: mood WNL, behavior WNL      OB Ultrasound:  Please see \"imaging\" tab under chart review for today's ultrasound results.      Assessment/Plan:  34 year old  at 37w2d here for follow up OB visit.    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, no evidence of portal hypertension  - Splenomegaly with thrombocytopenia  - Chronic hypertension  - Previous pregnancy affected by fetal pulmonary valve stenosis requiring  balloon angioplasty  - Mild anemia   - Tachycardia    - H/o PPH (1L EBL, no blood transfusion required)   - H/o GDM      Fetal Dx:   - Urinary tract dilation A2-3 (UTD A2-3) - resolved (2025)  - Marginal cord " insertion  - Unstable lie            #Autoimmune Hepatitis (diagnosed in 6/2021)   #Splenomegaly with Thrombocytopenia   Presented in June 2021 with abnormal LFTs (  TBR 12.8 DBR 7.6 INR 1.6.) MRI with multiple liver cysts. 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 Started on Prednisone with Azathioprine added. No evidence of portal hypertension or esophageal varices on EGD 11/7/24   - Stable on azathioprine 50 mg daily; last flare over a year ago  - Last visit with Dr. Borden 1/6/25-see note:  - Liver enzymes with CBC and diff every 2-3 months while pregnant then every 4-6 weeks after she delivers.    - Continue Imuran daily, may try to wean again in the future.    - Will check CBC with diff and LFT's again at 36w OB office visit, check again on admission for delivery   - LFTs stable and platelets at 120.  - Persistent thrombocytopenia attributed to azathioprine and splenomegaly  - Review of previous documentation from Dr. Borden on 6/2023 mentioned screening for splenic artery aneurysm prior to getting pregnant; previous imaging reviewed at their tumor board - sufficient without concern, no further imaging indicated     #Chronic hypertension   - remains asymptomatic; not currently on antihypertensive medication.   - monitoring Bps at home with home blood pressure cuff. All Bps at goal and < 140/90 with normal HR at home. Continue home monitoring.  - Reviewed warning signs/symptoms of preeclampsia   - on low dose daily aspirin for preeclampsia prevention  - Goal BP <140/90; reviewed that if antihypertensive medications are to be initiated for elevated BPs, then would need to first rule out superimposed preeclampsia by getting preeclampsia labs  - Instructed to call with any elevations >140/90 to initiate PO antihypertensive medication; preferably labetalol given her tachycardia      #Tachycardia  - asymptomatic; HR improves at end of office visits; home heart rates  in the 80s-90s  - TSH collected on  normal  - Likely association with anxiety in the clinic setting, her anxiety increases in the medical setting due to her extensive Liver history    - EKG completed 3/28 which showed sinus tachycardia      #Anemia   - Discussed starting OTC iron supplement every other day and taking with orange juice  - Discussed possible constipation side effect, recommended increased hydration and stool softners PRN      #Fetal urinary tract dilation A1-> now A2  - resolved       #Marginal cord insertion  - previously reviewed ultrasound finding with patient  - will continue serial fetal growth ultrasound assessments     #Unstable fetal lie  - 3/28 and 4/15 Breech.  - Patient not interested in ECV if fetal presentation remains breech at 39 weeks.   - If breech at next visit, will plan to reschedule IOL to c/s. If cephalic on presentation for surgery, can revert back to IOL. Currently available c/s on .     #Previous pregnancy affected by fetal congenital heart anomaly (pulmonary valve stenosis)  - Fetal Echocardiogram completed and wnl      # Surveillance  - Growth US every 4 weeks until delivery  - Weekly BPP until delivery       #Delivery planning  - S/p Anesthesia consult--continue to monitor thrombocytopenia--plans epidural  - Delivery timing to be 39 0/7 weeks with induction of labor - scheduled  0730.   - Reviewed typical IOL methods today. Discussed that balloon dilation may or may not be recommended and that she can start with other options first if wanting to avoid balloon as best as possible.   - Feeding: bottle feeding with formula  - Contraception plans: Desires condoms and natural family planning until  receives vasectomy      #Routine PNC   - Prenatal labs 10/15/2024:   Rh pos (B pos), antibody negative   Hgb 13.2 MCV 87 Plt 115K               HepBAg/HIV/RPR/HepCAb: negative   Hep B: immune               GC/CT: declines               Rubella: immune                  UC: 10,000-50,000 CFU/mL Mixture of urogenital danna               Hgb A1c 4.9%               GCT: 122 on 1/17/25  - Pap smear: NILM, HPV neg  (10/15/2024)  - Aneuploidy screening: low risk NIPT  (10/22/2024)  - S/p Tdap vaccine 2/28  - GBS negative    30 minutes spent on the date of the encounter, doing chart review, history and exam, documentation and further activities as noted.      Janay Jacome CNM on 4/15/2025 at 2:51 PM

## 2025-04-23 ENCOUNTER — HOSPITAL ENCOUNTER (OUTPATIENT)
Dept: ULTRASOUND IMAGING | Facility: CLINIC | Age: 35
Discharge: HOME | End: 2025-04-23
Attending: STUDENT IN AN ORGANIZED HEALTH CARE EDUCATION/TRAINING PROGRAM
Payer: COMMERCIAL

## 2025-04-23 ENCOUNTER — OFFICE VISIT (OUTPATIENT)
Dept: MATERNAL FETAL MEDICINE | Facility: CLINIC | Age: 35
End: 2025-04-23
Attending: STUDENT IN AN ORGANIZED HEALTH CARE EDUCATION/TRAINING PROGRAM
Payer: COMMERCIAL

## 2025-04-23 VITALS
HEART RATE: 117 BPM | SYSTOLIC BLOOD PRESSURE: 126 MMHG | OXYGEN SATURATION: 98 % | RESPIRATION RATE: 18 BRPM | BODY MASS INDEX: 36.84 KG/M2 | DIASTOLIC BLOOD PRESSURE: 80 MMHG | WEIGHT: 214.6 LBS

## 2025-04-23 DIAGNOSIS — R00.0 TACHYCARDIA: ICD-10-CM

## 2025-04-23 DIAGNOSIS — O09.93 HIGH-RISK PREGNANCY IN THIRD TRIMESTER: Primary | ICD-10-CM

## 2025-04-23 DIAGNOSIS — O26.92 PREGNANCY RELATED CONDITION IN SECOND TRIMESTER: ICD-10-CM

## 2025-04-23 DIAGNOSIS — O09.93 SUPERVISION OF HIGH RISK PREGNANCY IN THIRD TRIMESTER: Primary | ICD-10-CM

## 2025-04-23 DIAGNOSIS — K75.4 AUTOIMMUNE HEPATITIS (H): ICD-10-CM

## 2025-04-23 DIAGNOSIS — O10.919 CHRONIC HYPERTENSION IN PREGNANCY: ICD-10-CM

## 2025-04-23 DIAGNOSIS — D69.6 THROMBOCYTOPENIA: ICD-10-CM

## 2025-04-23 PROCEDURE — 76819 FETAL BIOPHYS PROFIL W/O NST: CPT

## 2025-04-23 PROCEDURE — 99215 OFFICE O/P EST HI 40 MIN: CPT | Mod: 25 | Performed by: ADVANCED PRACTICE MIDWIFE

## 2025-04-23 NOTE — NURSING NOTE
Yaritza seen in clinic today for OB visit at 38w3d gestation. VSS. Pt reports postitive fetal movement, see flowsheet. Pt evaluated for  labor, preeclampsia, vaginal bleeding, infection, fetal wellbeing without concerns. Pt denies bleeding/lof/change in vaginal discharge/contractions/headache/vision changes/chest pain/SOB/edema. Pt notified to review new pt education in AVS, verbally reviewed highlights. SBAR given to Janay Jacome CNM and Dr. Joshua who met with pt and discussed POC. Plan for IOL on - already scheduled. IOL instructions given, all questions answered. Unstable lie- vertex today. Home BP reviewed, 115's/70s. Birth control plan is partner vasectomy. Future visits scheduled at . Pt discharged stable and ambulatory.

## 2025-04-23 NOTE — PROGRESS NOTES
The patient was seen for an ultrasound in the [facility] clinic today.  For a detailed report of the ultrasound examination, please see the ultrasound report which can be found under the imaging tab.    If you have questions regarding today's evaluation or if we can be of further service, please contact the [facility].    Abdon Joshua M.D.  Maternal Fetal-Medicine Specialist

## 2025-04-28 ENCOUNTER — ANESTHESIA EVENT (OUTPATIENT)
Dept: OBGYN | Facility: CLINIC | Age: 35
End: 2025-04-28
Payer: COMMERCIAL

## 2025-04-28 ENCOUNTER — HOSPITAL ENCOUNTER (INPATIENT)
Facility: CLINIC | Age: 35
End: 2025-04-28
Attending: STUDENT IN AN ORGANIZED HEALTH CARE EDUCATION/TRAINING PROGRAM | Admitting: STUDENT IN AN ORGANIZED HEALTH CARE EDUCATION/TRAINING PROGRAM
Payer: COMMERCIAL

## 2025-04-28 ENCOUNTER — ANESTHESIA (OUTPATIENT)
Dept: OBGYN | Facility: CLINIC | Age: 35
End: 2025-04-28
Payer: COMMERCIAL

## 2025-04-28 DIAGNOSIS — Z98.891 S/P CESAREAN SECTION: Primary | ICD-10-CM

## 2025-04-28 PROBLEM — Z34.90 PREGNANCY: Status: ACTIVE | Noted: 2025-04-28

## 2025-04-28 LAB
ABO + RH BLD: NORMAL
ALBUMIN MFR UR ELPH: <6 MG/DL
ALBUMIN SERPL BCG-MCNC: 3.3 G/DL (ref 3.5–5.2)
ALP SERPL-CCNC: 103 U/L (ref 40–150)
ALT SERPL W P-5'-P-CCNC: 11 U/L (ref 0–50)
ANION GAP SERPL CALCULATED.3IONS-SCNC: 11 MMOL/L (ref 7–15)
AST SERPL W P-5'-P-CCNC: 16 U/L (ref 0–45)
BASOPHILS # BLD AUTO: 0 10E3/UL (ref 0–0.2)
BASOPHILS NFR BLD AUTO: 0 %
BILIRUB SERPL-MCNC: 0.6 MG/DL
BLD GP AB SCN SERPL QL: NEGATIVE
BUN SERPL-MCNC: 11 MG/DL (ref 6–20)
CALCIUM SERPL-MCNC: 8.8 MG/DL (ref 8.8–10.4)
CHLORIDE SERPL-SCNC: 104 MMOL/L (ref 98–107)
CREAT SERPL-MCNC: 0.62 MG/DL (ref 0.51–0.95)
CREAT UR-MCNC: 48.1 MG/DL
EGFRCR SERPLBLD CKD-EPI 2021: >90 ML/MIN/1.73M2
EOSINOPHIL # BLD AUTO: 0.1 10E3/UL (ref 0–0.7)
EOSINOPHIL NFR BLD AUTO: 1 %
ERYTHROCYTE [DISTWIDTH] IN BLOOD BY AUTOMATED COUNT: 16.1 % (ref 10–15)
GLUCOSE SERPL-MCNC: 99 MG/DL (ref 70–99)
HCO3 SERPL-SCNC: 20 MMOL/L (ref 22–29)
HCT VFR BLD AUTO: 32.1 % (ref 35–47)
HGB BLD-MCNC: 10.4 G/DL (ref 11.7–15.7)
IMM GRANULOCYTES # BLD: 0.1 10E3/UL
IMM GRANULOCYTES NFR BLD: 2 %
LYMPHOCYTES # BLD AUTO: 0.8 10E3/UL (ref 0.8–5.3)
LYMPHOCYTES NFR BLD AUTO: 11 %
MCH RBC QN AUTO: 26.2 PG (ref 26.5–33)
MCHC RBC AUTO-ENTMCNC: 32.4 G/DL (ref 31.5–36.5)
MCV RBC AUTO: 81 FL (ref 78–100)
MONOCYTES # BLD AUTO: 0.6 10E3/UL (ref 0–1.3)
MONOCYTES NFR BLD AUTO: 8 %
NEUTROPHILS # BLD AUTO: 5.8 10E3/UL (ref 1.6–8.3)
NEUTROPHILS NFR BLD AUTO: 78 %
NRBC # BLD AUTO: 0 10E3/UL
NRBC BLD AUTO-RTO: 0 /100
PLATELET # BLD AUTO: 123 10E3/UL (ref 150–450)
POTASSIUM SERPL-SCNC: 4.4 MMOL/L (ref 3.4–5.3)
PROT SERPL-MCNC: 5.9 G/DL (ref 6.4–8.3)
PROT/CREAT 24H UR: NORMAL MG/G{CREAT}
RBC # BLD AUTO: 3.97 10E6/UL (ref 3.8–5.2)
SODIUM SERPL-SCNC: 135 MMOL/L (ref 135–145)
SPECIMEN EXP DATE BLD: NORMAL
T PALLIDUM AB SER QL: NONREACTIVE
WBC # BLD AUTO: 7.4 10E3/UL (ref 4–11)

## 2025-04-28 PROCEDURE — 3E0P7VZ INTRODUCTION OF HORMONE INTO FEMALE REPRODUCTIVE, VIA NATURAL OR ARTIFICIAL OPENING: ICD-10-PCS | Performed by: STUDENT IN AN ORGANIZED HEALTH CARE EDUCATION/TRAINING PROGRAM

## 2025-04-28 PROCEDURE — 84156 ASSAY OF PROTEIN URINE: CPT

## 2025-04-28 PROCEDURE — 258N000003 HC RX IP 258 OP 636

## 2025-04-28 PROCEDURE — 86900 BLOOD TYPING SEROLOGIC ABO: CPT

## 2025-04-28 PROCEDURE — 120N000002 HC R&B MED SURG/OB UMMC

## 2025-04-28 PROCEDURE — 86780 TREPONEMA PALLIDUM: CPT

## 2025-04-28 PROCEDURE — 82310 ASSAY OF CALCIUM: CPT

## 2025-04-28 PROCEDURE — 85025 COMPLETE CBC W/AUTO DIFF WBC: CPT

## 2025-04-28 PROCEDURE — 250N000013 HC RX MED GY IP 250 OP 250 PS 637

## 2025-04-28 RX ORDER — TERBUTALINE SULFATE 1 MG/ML
0.25 INJECTION SUBCUTANEOUS
Status: DISCONTINUED | OUTPATIENT
Start: 2025-04-28 | End: 2025-04-30 | Stop reason: HOSPADM

## 2025-04-28 RX ORDER — KETOROLAC TROMETHAMINE 15 MG/ML
15 INJECTION, SOLUTION INTRAMUSCULAR; INTRAVENOUS
Status: COMPLETED | OUTPATIENT
Start: 2025-04-28 | End: 2025-04-30

## 2025-04-28 RX ORDER — ONDANSETRON 2 MG/ML
4 INJECTION INTRAMUSCULAR; INTRAVENOUS EVERY 6 HOURS PRN
Status: DISCONTINUED | OUTPATIENT
Start: 2025-04-28 | End: 2025-04-30 | Stop reason: HOSPADM

## 2025-04-28 RX ORDER — MISOPROSTOL 100 UG/1
25 TABLET ORAL EVERY 4 HOURS
Status: DISCONTINUED | OUTPATIENT
Start: 2025-04-28 | End: 2025-04-28

## 2025-04-28 RX ORDER — FENTANYL CITRATE 50 UG/ML
100 INJECTION, SOLUTION INTRAMUSCULAR; INTRAVENOUS
Status: DISCONTINUED | OUTPATIENT
Start: 2025-04-28 | End: 2025-04-30 | Stop reason: HOSPADM

## 2025-04-28 RX ORDER — CARBOPROST TROMETHAMINE 250 UG/ML
250 INJECTION, SOLUTION INTRAMUSCULAR
Status: DISCONTINUED | OUTPATIENT
Start: 2025-04-28 | End: 2025-04-30 | Stop reason: HOSPADM

## 2025-04-28 RX ORDER — OXYTOCIN 10 [USP'U]/ML
10 INJECTION, SOLUTION INTRAMUSCULAR; INTRAVENOUS
Status: DISCONTINUED | OUTPATIENT
Start: 2025-04-28 | End: 2025-04-30

## 2025-04-28 RX ORDER — NALOXONE HYDROCHLORIDE 0.4 MG/ML
0.2 INJECTION, SOLUTION INTRAMUSCULAR; INTRAVENOUS; SUBCUTANEOUS
Status: DISCONTINUED | OUTPATIENT
Start: 2025-04-28 | End: 2025-05-02 | Stop reason: HOSPADM

## 2025-04-28 RX ORDER — OXYTOCIN 10 [USP'U]/ML
10 INJECTION, SOLUTION INTRAMUSCULAR; INTRAVENOUS
Status: DISCONTINUED | OUTPATIENT
Start: 2025-04-28 | End: 2025-04-30 | Stop reason: HOSPADM

## 2025-04-28 RX ORDER — METHYLERGONOVINE MALEATE 0.2 MG/ML
200 INJECTION INTRAVENOUS
Status: DISCONTINUED | OUTPATIENT
Start: 2025-04-28 | End: 2025-04-30 | Stop reason: HOSPADM

## 2025-04-28 RX ORDER — CITRIC ACID/SODIUM CITRATE 334-500MG
30 SOLUTION, ORAL ORAL
Status: DISCONTINUED | OUTPATIENT
Start: 2025-04-28 | End: 2025-04-30 | Stop reason: HOSPADM

## 2025-04-28 RX ORDER — ONDANSETRON 4 MG/1
4 TABLET, ORALLY DISINTEGRATING ORAL EVERY 6 HOURS PRN
Status: DISCONTINUED | OUTPATIENT
Start: 2025-04-28 | End: 2025-04-30 | Stop reason: HOSPADM

## 2025-04-28 RX ORDER — CITRIC ACID/SODIUM CITRATE 334-500MG
30 SOLUTION, ORAL ORAL ONCE
Status: DISCONTINUED | OUTPATIENT
Start: 2025-04-28 | End: 2025-04-30 | Stop reason: HOSPADM

## 2025-04-28 RX ORDER — LOPERAMIDE HYDROCHLORIDE 2 MG/1
2 CAPSULE ORAL
Status: DISCONTINUED | OUTPATIENT
Start: 2025-04-28 | End: 2025-04-30 | Stop reason: HOSPADM

## 2025-04-28 RX ORDER — METOCLOPRAMIDE HYDROCHLORIDE 5 MG/ML
10 INJECTION INTRAMUSCULAR; INTRAVENOUS EVERY 6 HOURS PRN
Status: DISCONTINUED | OUTPATIENT
Start: 2025-04-28 | End: 2025-04-30 | Stop reason: HOSPADM

## 2025-04-28 RX ORDER — MISOPROSTOL 100 UG/1
25 TABLET ORAL EVERY 4 HOURS
Status: DISPENSED | OUTPATIENT
Start: 2025-04-28 | End: 2025-04-29

## 2025-04-28 RX ORDER — OXYTOCIN/0.9 % SODIUM CHLORIDE 30/500 ML
340 PLASTIC BAG, INJECTION (ML) INTRAVENOUS CONTINUOUS PRN
Status: DISCONTINUED | OUTPATIENT
Start: 2025-04-28 | End: 2025-04-30 | Stop reason: HOSPADM

## 2025-04-28 RX ORDER — PROCHLORPERAZINE MALEATE 10 MG
10 TABLET ORAL EVERY 6 HOURS PRN
Status: DISCONTINUED | OUTPATIENT
Start: 2025-04-28 | End: 2025-04-30 | Stop reason: HOSPADM

## 2025-04-28 RX ORDER — NALOXONE HYDROCHLORIDE 0.4 MG/ML
0.4 INJECTION, SOLUTION INTRAMUSCULAR; INTRAVENOUS; SUBCUTANEOUS
Status: DISCONTINUED | OUTPATIENT
Start: 2025-04-28 | End: 2025-05-02 | Stop reason: HOSPADM

## 2025-04-28 RX ORDER — MISOPROSTOL 200 UG/1
800 TABLET ORAL
Status: DISCONTINUED | OUTPATIENT
Start: 2025-04-28 | End: 2025-04-30 | Stop reason: HOSPADM

## 2025-04-28 RX ORDER — OXYTOCIN/0.9 % SODIUM CHLORIDE 30/500 ML
100-340 PLASTIC BAG, INJECTION (ML) INTRAVENOUS CONTINUOUS PRN
Status: DISCONTINUED | OUTPATIENT
Start: 2025-04-28 | End: 2025-04-30

## 2025-04-28 RX ORDER — TRANEXAMIC ACID 10 MG/ML
1 INJECTION, SOLUTION INTRAVENOUS EVERY 30 MIN PRN
Status: DISCONTINUED | OUTPATIENT
Start: 2025-04-28 | End: 2025-04-30 | Stop reason: HOSPADM

## 2025-04-28 RX ORDER — MISOPROSTOL 200 UG/1
400 TABLET ORAL
Status: DISCONTINUED | OUTPATIENT
Start: 2025-04-28 | End: 2025-04-30 | Stop reason: HOSPADM

## 2025-04-28 RX ORDER — LIDOCAINE 40 MG/G
CREAM TOPICAL
Status: DISCONTINUED | OUTPATIENT
Start: 2025-04-28 | End: 2025-04-30

## 2025-04-28 RX ORDER — MORPHINE SULFATE 10 MG/ML
10 INJECTION, SOLUTION INTRAMUSCULAR; INTRAVENOUS
Status: COMPLETED | OUTPATIENT
Start: 2025-04-28 | End: 2025-04-29

## 2025-04-28 RX ORDER — METOCLOPRAMIDE 10 MG/1
10 TABLET ORAL EVERY 6 HOURS PRN
Status: DISCONTINUED | OUTPATIENT
Start: 2025-04-28 | End: 2025-04-30 | Stop reason: HOSPADM

## 2025-04-28 RX ORDER — MISOPROSTOL 100 UG/1
25 TABLET ORAL
Status: DISCONTINUED | OUTPATIENT
Start: 2025-04-28 | End: 2025-04-28

## 2025-04-28 RX ORDER — ACETAMINOPHEN 325 MG/1
650 TABLET ORAL EVERY 4 HOURS PRN
Status: DISCONTINUED | OUTPATIENT
Start: 2025-04-28 | End: 2025-04-30 | Stop reason: HOSPADM

## 2025-04-28 RX ORDER — LOPERAMIDE HYDROCHLORIDE 2 MG/1
4 CAPSULE ORAL
Status: DISCONTINUED | OUTPATIENT
Start: 2025-04-28 | End: 2025-04-30 | Stop reason: HOSPADM

## 2025-04-28 RX ORDER — IBUPROFEN 800 MG/1
800 TABLET, FILM COATED ORAL
Status: COMPLETED | OUTPATIENT
Start: 2025-04-28 | End: 2025-04-30

## 2025-04-28 RX ORDER — HYDROXYZINE HYDROCHLORIDE 50 MG/1
100 TABLET, FILM COATED ORAL
Status: DISCONTINUED | OUTPATIENT
Start: 2025-04-28 | End: 2025-04-29

## 2025-04-28 RX ORDER — SODIUM CHLORIDE, SODIUM LACTATE, POTASSIUM CHLORIDE, CALCIUM CHLORIDE 600; 310; 30; 20 MG/100ML; MG/100ML; MG/100ML; MG/100ML
INJECTION, SOLUTION INTRAVENOUS CONTINUOUS
Status: DISCONTINUED | OUTPATIENT
Start: 2025-04-28 | End: 2025-04-30 | Stop reason: HOSPADM

## 2025-04-28 RX ADMIN — MISOPROSTOL 25 MCG: 100 TABLET ORAL at 20:39

## 2025-04-28 RX ADMIN — SODIUM CHLORIDE, SODIUM LACTATE, POTASSIUM CHLORIDE, AND CALCIUM CHLORIDE 500 ML: .6; .31; .03; .02 INJECTION, SOLUTION INTRAVENOUS at 17:20

## 2025-04-28 RX ADMIN — MISOPROSTOL 25 MCG: 100 TABLET ORAL at 15:09

## 2025-04-28 RX ADMIN — SODIUM CHLORIDE, SODIUM LACTATE, POTASSIUM CHLORIDE, AND CALCIUM CHLORIDE 500 ML: .6; .31; .03; .02 INJECTION, SOLUTION INTRAVENOUS at 12:36

## 2025-04-28 RX ADMIN — SODIUM CHLORIDE, SODIUM LACTATE, POTASSIUM CHLORIDE, AND CALCIUM CHLORIDE: .6; .31; .03; .02 INJECTION, SOLUTION INTRAVENOUS at 17:20

## 2025-04-28 RX ADMIN — MISOPROSTOL 25 MCG: 100 TABLET ORAL at 09:08

## 2025-04-28 ASSESSMENT — ACTIVITIES OF DAILY LIVING (ADL)
ADLS_ACUITY_SCORE: 23

## 2025-04-28 NOTE — PROGRESS NOTES
Labor Note    S: not feeling contractions yet    O:   FHT baseline 140/moderate variability/+accels/occ late decelerations  SVE: FT/30/-3, posterior    A/P:  Discussed Cat 2 tracing, minimal change after first dose of misoprostol. Discussed options of continued PV miso with risk of worsening Cat 2 and therefore risk of urgent/emergent , jay balloon, pitocin. Discussed option of epidural prior to any intervention. With shared-decision making, given likely challenging balloon placement, will do another dose of misoprostol and monitor FHT closely.    Irma Young MD,  25 3:11 PM

## 2025-04-28 NOTE — PROVIDER NOTIFICATION
04/28/25 1230   Provider Notification   Provider Name/Title    Method of Notification In Department   Request Evaluate - Remote   Notification Reason Decels     Pt having decels. Not feeling contractions. Due for next dose of miso around 1pm. Reviewed strip with . Ordered IV fluid bolus and hold miso until  can review strip. Continue close monitoring.

## 2025-04-28 NOTE — PROVIDER NOTIFICATION
04/28/25 1500   Provider Notification   Provider Name/Title Dr Young   Method of Notification At Bedside   Request Evaluate in Person   Notification Reason SVE;Status Update     Provider at bedside to recheck cervix and assess for jay bulb placement. Pt cervix unchanged from this morning. Plan to wait on jay and continue with another dose of vag miso. Pt agreeable to plan.

## 2025-04-28 NOTE — H&P
[facility]  OB History and Physical   2025  Yaritza Pepe  [id number]    HPI: Yaritza Pepe is a 34 year old  at 39w1d by LMP c/w 11w5d US who presents for scheduled IOL in [place] on no meds.    She states that she is feeling well today.  She denies vaginal bleeding or loss of fluid and is feeling normal fetal movement. She denies headache, vision changes, chest pain, shortness of breath, fever, chills, nausea, vomiting or other systemic complaints. No dysuria, changes in vaginal discharge, or edema in extremities noted.  Reports home blood pressures have been normotensive.    Pregnancy has been complicated by:   - History of autoimmune hepatitis with necrosis and resultant advanced fibrosis, no evidence of portal hypertension  - Splenomegaly with thrombocytopenia  - Chronic hypertension  - Previous pregnancy affected by fetal pulmonary valve stenosis requiring  balloon angioplasty  - Mild anemia   - Tachycardia 2/2 anxiety with negative workup  - Advanced maternal age  - H/o PPH (1L EBL, no blood transfusion required)   - H/o 3a perineal laceration  - H/o GDM      Fetal Dx:   - Urinary tract dilation A1 (UTDA1)  - Marginal cord insertion  - Unstable lie    Her OB history is notable for  at 38w6d complicated by PPH (EBL 1000 mL) 2/2 3a perineal laceration bleeding following IOL for cHTN at 38w4d. Her pelvis was proven to 2910 g. (6#6oz).    OB History   OB History    Para Term  AB Living   2 1 1 0 0 1   SAB IAB Ectopic Multiple Live Births   0 0 0 0 1      # Outcome Date GA Lbr Eric/2nd Weight Sex Type Anes PTL Lv   2 Current            1 Term 21 38w6d 05:53 / 02:33 2.91 kg (6 lb 6.7 oz) F Vag-Spont Nitrous, Other N MIKEL      Birth Comments: IOL for chronic HTN, EBL 1000mL      Name: SNEHA PEPE      Apgar1: 8  Apgar5: 9     Past Medical History  Past Medical History:   Diagnosis Date    Autoimmune  hepatitis (H)     Gestational diabetes 2020    Kidney stone     Motion sickness      Past Surgical History  Past Surgical History:   Procedure Laterality Date    COMBINED CYSTOSCOPY, RETROGRADES, URETEROSCOPY, LASER HOLMIUM LITHOTRIPSY URETER(S), INSERT STENT Right 6/11/2024    Procedure: CYSTOURETEROSCOPY, WITH RETROGRADE PYELOGRAM, LASER LITHOTRIPSY OF URETERAL CALCULUS,STONE BASKSET RETRIVAL AND STENT INSERTION;  Surgeon: Naldo Neil MD;  Location: [facility]    ESOPHAGOSCOPY, GASTROSCOPY, DUODENOSCOPY (EGD), COMBINED N/A 9/8/2022    Procedure: ESOPHAGOGASTRODUODENOSCOPY (EGD);  Surgeon: Ashlee Borden MD;  Location: [facility]    ESOPHAGOSCOPY, GASTROSCOPY, DUODENOSCOPY (EGD), COMBINED N/A 11/7/2024    Procedure: Esophagoscopy, gastroscopy, duodenoscopy (EGD), combined;  Surgeon: Enrique Ybarra MD;  Location: [facility]    IR LIVER BIOPSY PERCUTANEOUS  3/18/2022     Medications   Prior to Admission medications    Medication Sig Last Dose Taking? Auth Provider Long Term End Date   aspirin 81 MG EC tablet Take 81 mg by mouth every morning. 4/27/2025 Morning Yes Reported, Patient     azaTHIOprine (IMURAN) 50 MG tablet Take 1 tablet (50 mg) by mouth daily. 4/27/2025 Morning Yes Ashlee Borden MD Yes    ferrous sulfate (FEROSUL) 325 (65 Fe) MG tablet Take 325 mg by mouth daily (with breakfast). 4/27/2025 Morning Yes Reported, Patient     Prenatal Vit-DSS-Fe Cbn-FA (PRENATAL AD PO) Take 1 tablet by mouth every evening. 4/28/2025 Yes Reported, Patient     acetaminophen (TYLENOL) 325 MG tablet Take 2 tablets (650 mg) by mouth every 6 hours as needed for mild pain. Start after Delivery.   Mary Jane Mtz APRN CNM     ibuprofen (ADVIL/MOTRIN) 600 MG tablet Take 1 tablet (600 mg) by mouth every 6 hours as needed for moderate pain. Start after delivery   Mary Jane Mtz APRN CNM     senna-docusate (SENOKOT-S/PERICOLACE) 8.6-50 MG tablet Take 1 tablet by mouth daily. Start after delivery.    Mary Jane Mtz APRN CNM       Allergies  Allergies   Allergen Reactions    Penicillins GI Disturbance     Family History  Family History   Problem Relation Age of Onset    Hypertension Mother     No Known Problems Father     Celiac Disease Sister     No Known Problems Sister     No Known Problems Sister     No Known Problems Maternal Grandmother     No Known Problems Maternal Grandfather     No Known Problems Paternal Grandmother     No Known Problems Paternal Grandfather     Heart Defect Daughter         Pulmonary valve stenosis    Anesthesia Reaction No family hx of     Malignant Hyperthermia No family hx of     Deep Vein Thrombosis No family hx of     Genetic Disease No family hx of     Birth defects No family hx of     Breast Cancer No family hx of     Ovarian cysts No family hx of      Social History   Social History     Tobacco Use    Smoking status: Never     Passive exposure: Never    Smokeless tobacco: Never   Vaping Use    Vaping status: Never Used   Substance Use Topics    Alcohol use: Not Currently    Drug use: Never     Physical Exam  Vitals:    04/28/25 1210   BP: 119/73     General: alert, oriented female, resting in bed in NAD  CV: regular rate and rhythm  Lungs: normal rate and work of breathing on room air  Abdomen: soft, gravid, non-tender to palpation    Cervix: Fingertip / 30% / -3  Membranes: intact  Presentation: cephalic by bedside US    FHT: baseline 140 bpm, moderate variability, accelerations present, decelerations absent  [place]: no contractions per 10 minutes    Prenatal Labs:  Rh positive, antibody negative  Rubella immune, Hepatitis B immune  Hepatitis B sAg NR, Hepatitis C NR, HIV NR, RPR negative  GC/Chlam not assessed  Genetic screening: NIPT low risk  , Hgb A1c 4.9%  GBS negative    Imaging:   [facility] Comprehensive US (4/23)  1. [place] pregnancy at 38w 3d gestational age.  2. Right renal pelvis measured 7.9 mm without calyceal or ureteral dilation (UTDA1). None of  the other anomalies commonly detected by ultrasound were evident in the  limited fetal anatomic survey as described above.  3. Growth parameters and estimated fetal weight were appropriate for gestational age. EFW 3214 g (40%), AC 53%.  4. The amniotic fluid volume appeared normal.  5. Known marginal cord insertion.  6. The BPP was reassuring.    Labs:  Recent Results (from the past 24 hours)   Comprehensive metabolic panel    Collection Time: 04/28/25  9:00 AM   Result Value Ref Range    Sodium 135 135 - 145 mmol/L    Potassium 4.4 3.4 - 5.3 mmol/L    Carbon Dioxide (CO2) 20 (L) 22 - 29 mmol/L    Anion Gap 11 7 - 15 mmol/L    Urea Nitrogen 11.0 6.0 - 20.0 mg/dL    Creatinine 0.62 0.51 - 0.95 mg/dL    GFR Estimate >90 >60 mL/min/1.73m2    Calcium 8.8 8.8 - 10.4 mg/dL    Chloride 104 98 - 107 mmol/L    Glucose 99 70 - 99 mg/dL    Alkaline Phosphatase 103 40 - 150 U/L    AST 16 0 - 45 U/L    ALT 11 0 - 50 U/L    Protein Total 5.9 (L) 6.4 - 8.3 g/dL    Albumin 3.3 (L) 3.5 - 5.2 g/dL    Bilirubin Total 0.6 <=1.2 mg/dL   Adult Type and Screen    Collection Time: 04/28/25  9:00 AM   Result Value Ref Range    ABO/RH(D) B POS     Antibody Screen Negative Negative    SPECIMEN EXPIRATION DATE [id number]    CBC with platelets and differential    Collection Time: 04/28/25  9:00 AM   Result Value Ref Range    WBC Count 7.4 4.0 - 11.0 10e3/uL    RBC Count 3.97 3.80 - 5.20 10e6/uL    Hemoglobin 10.4 (L) 11.7 - 15.7 g/dL    Hematocrit 32.1 (L) 35.0 - 47.0 %    MCV 81 78 - 100 fL    MCH 26.2 (L) 26.5 - 33.0 pg    MCHC 32.4 31.5 - 36.5 g/dL    RDW 16.1 (H) 10.0 - 15.0 %    Platelet Count 123 (L) 150 - 450 10e3/uL    % Neutrophils 78 %    % Lymphocytes 11 %    % Monocytes 8 %    % Eosinophils 1 %    % Basophils 0 %    % Immature Granulocytes 2 %    NRBCs per 100 WBC 0 <1 /100    Absolute Neutrophils 5.8 1.6 - 8.3 10e3/uL    Absolute Lymphocytes 0.8 0.8 - 5.3 10e3/uL    Absolute Monocytes 0.6 0.0 - 1.3 10e3/uL    Absolute  Eosinophils 0.1 0.0 - 0.7 10e3/uL    Absolute Basophils 0.0 0.0 - 0.2 10e3/uL    Absolute Immature Granulocytes 0.1 <=0.4 10e3/uL    Absolute NRBCs 0.0 10e3/uL   Protein  random urine    Collection Time: 25 11:05 AM   Result Value Ref Range    Total Protein Urine mg/dL <6.0   mg/dL    Total Protein Urine mg/mg Creat      Creatinine Urine mg/dL 48.1 mg/dL       A/P: 34 year old  at 39w1d who presents for scheduled IOL in the setting of cHTN on no meds.    Maternal Dx  - History of autoimmune hepatitis with necrosis and resultant advanced fibrosis, no evidence of portal hypertension  - Splenomegaly with thrombocytopenia  - Chronic hypertension  - Previous pregnancy affected by fetal pulmonary valve stenosis requiring  balloon angioplasty  - Mild anemia   - Tachycardia 2/2 anxiety w/ negative workup  - Advanced maternal age  - H/o PPH (1L EBL, no blood transfusion required)   - H/o 3a perineal laceration  - H/o GDM      Fetal Dx:   - Urinary tract dilation A1 (UTDA1)  - Marginal cord insertion  - Unstable lie    # Induction of Labor  # Anemia  # H/o PPH  - Management: vaginal misoprostol (Cytotec) 25mcg q4h. Discussed cervical ripening balloon. Patient has strong preference to avoid it if at all possible as she had a very negative experience in her first induction. Would be amenable to it if misoprostol is inadequate for ripening.  - Labs: CBC, T&S, RPR; will T&C 2U given anemia and history of PPH; last Hgb 9.8 ()  - GBS negative: no antibiotics indicated  - Pain control: per patient preference, options discussed  --- Vistaril and IM Morphine for therapeutic sleep PRN  --- Desires epidural in active labor  - PPH meds: avoid methergine  - DVT PPx: SCDs for prolonged periods of immobility  - Diet: regular    # Autoimmune Hepatitis (diagnosed in 2021)   # Splenomegaly with Thrombocytopenia   Presented in 2021 with abnormal LFTs (  TBR 12.8 DBR 7.6 INR 1.6.) MRI with multiple  liver cysts. 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 Started on Prednisone with Azathioprine added. No evidence of portal hypertension or esophageal varices on EGD 11/7/24.  - Stable on azathioprine 50 mg daily; last flare over a year ago   Last AST 15, ALT 9, Plt 120 (aminata 103 in pregnancy)  - Persistent thrombocytopenia attributed to azathioprine and splenomegaly  - Review of previous documentation from Dr. Borden on 6/2023 mentioned screening for splenic artery aneurysm prior to getting pregnant; previous imaging reviewed at their tumor board - sufficient without concern, no further imaging indicated  - Last visit with Dr. Borden 1/6/25:  - Liver enzymes with CBC and diff every 4-6 weeks after she delivers.    - Check CBC with diff and LFTs on admission for delivery     # Tachycardia  - asymptomatic; HR improves at end of office visits; home heart rates in the 80s-90s  - TSH collected on 11/22 normal  - Likely association with anxiety in the clinic setting, her anxiety increases in the medical setting due to her extensive Liver history    - EKG completed 3/28 which showed sinus tachycardia     # Chronic hypertension  - BP: currently normotensive range  -- Continue serial BP monitoring  -- IV antihypertensives prn for sustained severe range blood pressures (>160/>110)  - Symptoms: no headache, vision changes, chest pain, shortness of breath, RUQ or epigastric pain.  - Labs: HELLP labs, UPC ordered  - Daily weights, strict I&Os    # Unstable fetal lie  3/28 and 4/15 breech. Cephalic on 4/23. Cephalic on 4/28 bedside US.  - Will plan presentation scan prior to AROM    # Prenatal Care  - Rh positive, Rubella immune, GBS negative  - Feeding: bottle feeding with formula   - Contraception plans: Desires condoms and natural family planning until  receives vasectomy     # Fetal Well Being  Category 1 FHT, reactive.  - Continuous fetal monitoring  - Intrauterine resuscitative  measures PRN    Disposition: admit to Labor & Delivery    Patient staffed with Dr. Young.    Tomasz Henao MD  Obstetrics & Gynecology, PGY-2  04/28/2025 4:55 PM      I have seen and examined the patient. I have reviewed and agree with the note.   Irma Young MD

## 2025-04-28 NOTE — ANESTHESIA PREPROCEDURE EVALUATION
Anesthesia Pre-Procedure Evaluation    Patient: Yaritza Dias   MRN: [id number] : 1990        Procedure :           Past Medical History:   Diagnosis Date    Autoimmune hepatitis (H)     Gestational diabetes 2020    Kidney stone     Motion sickness       Past Surgical History:   Procedure Laterality Date    COMBINED CYSTOSCOPY, RETROGRADES, URETEROSCOPY, LASER HOLMIUM LITHOTRIPSY URETER(S), INSERT STENT Right 2024    Procedure: CYSTOURETEROSCOPY, WITH RETROGRADE PYELOGRAM, LASER LITHOTRIPSY OF URETERAL CALCULUS,STONE BASKSET RETRIVAL AND STENT INSERTION;  Surgeon: Naldo Neil MD;  Location: [facility] OR    ESOPHAGOSCOPY, GASTROSCOPY, DUODENOSCOPY (EGD), COMBINED N/A 2022    Procedure: ESOPHAGOGASTRODUODENOSCOPY (EGD);  Surgeon: Ashlee Borden MD;  Location: [facility] [place]    ESOPHAGOSCOPY, GASTROSCOPY, DUODENOSCOPY (EGD), COMBINED N/A 2024    Procedure: Esophagoscopy, gastroscopy, duodenoscopy (EGD), combined;  Surgeon: Enrique Ybarra MD;  Location:  GI    IR LIVER BIOPSY PERCUTANEOUS  3/18/2022      Allergies   Allergen Reactions    Penicillins GI Disturbance      Social History     Tobacco Use    Smoking status: Never     Passive exposure: Never    Smokeless tobacco: Never   Substance Use Topics    Alcohol use: Not Currently      Wt Readings from Last 1 Encounters:   25 97.3 kg (214 lb 9.6 oz)        Anesthesia Evaluation            ROS/MED HX  ENT/Pulmonary:  - neg pulmonary ROS     Neurologic:  - neg neurologic ROS     Cardiovascular:     (+)  hypertension- -   -  - -                                      METS/Exercise Tolerance:     Hematologic:     (+)      anemia,          Musculoskeletal:       GI/Hepatic:     (+)           hepatitis (Autoimmune) type Other,        Renal/Genitourinary:       Endo: Comment: 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 (no evidence of portal hypertension)      Psychiatric/Substance Use:  - neg  psychiatric ROS     Infectious Disease:       Malignancy:       Other:    @ 39w1d    Previous vag-spont x1 (IOL for cHTN) c/b PPH (1L EBL, no transfusion required)  Prev pregnancy affected by fetal pulmonary valve stenosis requiring  balloon angioplasty  Hx of GDMA1    Current fetal marginal cord insertion, urinary tract dilation (resolved )  (-) previous  and TOLAC candidate       Physical Exam    Airway        Mallampati: II   TM distance: > 3 FB   Neck ROM: full   Mouth opening: > 3 cm    Respiratory Devices and Support         Dental  no notable dental history         Cardiovascular   cardiovascular exam normal          Pulmonary   pulmonary exam normal                OUTSIDE LABS:  CBC:   Lab Results   Component Value Date    WBC 7.4 2025    WBC 7.3 2025    HGB 10.4 (L) 2025    HGB 9.8 (L) 2025    HCT 32.1 (L) 2025    HCT 30.1 (L) 2025     (L) 2025     (L) 2025     BMP:   Lab Results   Component Value Date     2025     2025    POTASSIUM 4.4 2025    POTASSIUM 4.2 2025    CHLORIDE 104 2025    CHLORIDE 105 2025    CO2 20 (L) 2025    CO2 20 (L) 2025    BUN 11.0 2025    BUN 6.7 2025    CR 0.62 2025    CR 0.55 2025    GLC 99 2025    GLC 84 2025     COAGS:   Lab Results   Component Value Date    PTT 27 2025    INR 1.04 2025    FIBR 146 (L) 2021     POC:   Lab Results   Component Value Date    HCG Negative 2024     HEPATIC:   Lab Results   Component Value Date    ALBUMIN 3.3 (L) 2025    PROTTOTAL 5.9 (L) 2025    ALT 11 2025    AST 16 2025    ALKPHOS 103 2025    BILITOTAL 0.6 2025    JILL <10 (L) 2021     OTHER:   Lab Results   Component Value Date    LACT 1.5 2021    A1C 4.9 10/15/2024    KENDY 8.8 2025    PHOS 3.6 2021    MAG 1.8 2024    LIPASE 42  02/23/2024    AMYLASE 41 02/23/2024    TSH 0.56 11/22/2024    T4 1.06 07/23/2021    CRP 5.7 07/25/2021    SED 13 02/23/2024       Anesthesia Plan    ASA Status:  3       Anesthesia Type: Epidural, General, Spinal.              Consents    Anesthesia Plan(s) and associated risks, benefits, and realistic alternatives discussed. Questions answered and patient/representative(s) expressed understanding.     - Discussed:     - Discussed with:  Patient, Spouse            Postoperative Care            Comments:               Brien RHODES Ma, MD    Clinically Significant Risk Factors Present on Admission               # Hypoalbuminemia: Lowest albumin = 3.3 g/dL at 4/28/2025  9:00 AM, will monitor as appropriate   # Drug Induced Platelet Defect: home medication list includes an antiplatelet medication   # Hypertension: Noted on problem list      # Anemia: based on hgb <11

## 2025-04-29 PROCEDURE — 0U7C7DJ DILATION OF CERVIX WITH INTRALUMINAL DEVICE, TEMPORARY, VIA NATURAL OR ARTIFICIAL OPENING: ICD-10-PCS | Performed by: OBSTETRICS & GYNECOLOGY

## 2025-04-29 PROCEDURE — 10907ZC DRAINAGE OF AMNIOTIC FLUID, THERAPEUTIC FROM PRODUCTS OF CONCEPTION, VIA NATURAL OR ARTIFICIAL OPENING: ICD-10-PCS | Performed by: OBSTETRICS & GYNECOLOGY

## 2025-04-29 PROCEDURE — 250N000011 HC RX IP 250 OP 636

## 2025-04-29 PROCEDURE — 00HU33Z INSERTION OF INFUSION DEVICE INTO SPINAL CANAL, PERCUTANEOUS APPROACH: ICD-10-PCS | Performed by: STUDENT IN AN ORGANIZED HEALTH CARE EDUCATION/TRAINING PROGRAM

## 2025-04-29 PROCEDURE — 3E0R3BZ INTRODUCTION OF ANESTHETIC AGENT INTO SPINAL CANAL, PERCUTANEOUS APPROACH: ICD-10-PCS | Performed by: STUDENT IN AN ORGANIZED HEALTH CARE EDUCATION/TRAINING PROGRAM

## 2025-04-29 PROCEDURE — 250N000011 HC RX IP 250 OP 636: Mod: JZ

## 2025-04-29 PROCEDURE — 120N000002 HC R&B MED SURG/OB UMMC

## 2025-04-29 PROCEDURE — 250N000012 HC RX MED GY IP 250 OP 636 PS 637

## 2025-04-29 PROCEDURE — 250N000013 HC RX MED GY IP 250 OP 250 PS 637

## 2025-04-29 PROCEDURE — 258N000003 HC RX IP 258 OP 636

## 2025-04-29 PROCEDURE — 250N000009 HC RX 250

## 2025-04-29 RX ORDER — AZATHIOPRINE 50 MG/1
50 TABLET ORAL DAILY
Status: DISCONTINUED | OUTPATIENT
Start: 2025-04-29 | End: 2025-05-02 | Stop reason: HOSPADM

## 2025-04-29 RX ORDER — FENTANYL/ROPIVACAINE/NS/PF 2MCG/ML-.1
PLASTIC BAG, INJECTION (ML) EPIDURAL
Status: DISCONTINUED | OUTPATIENT
Start: 2025-04-29 | End: 2025-04-30 | Stop reason: HOSPADM

## 2025-04-29 RX ORDER — HYDROXYZINE HYDROCHLORIDE 50 MG/1
50 TABLET, FILM COATED ORAL ONCE
Status: COMPLETED | OUTPATIENT
Start: 2025-04-29 | End: 2025-04-29

## 2025-04-29 RX ORDER — NALBUPHINE HYDROCHLORIDE 10 MG/ML
2.5-5 INJECTION INTRAMUSCULAR; INTRAVENOUS; SUBCUTANEOUS EVERY 6 HOURS PRN
Status: DISCONTINUED | OUTPATIENT
Start: 2025-04-29 | End: 2025-04-30

## 2025-04-29 RX ORDER — FENTANYL/ROPIVACAINE/NS/PF 2MCG/ML-.1
PLASTIC BAG, INJECTION (ML) EPIDURAL
Status: DISCONTINUED | OUTPATIENT
Start: 2025-04-29 | End: 2025-04-29

## 2025-04-29 RX ORDER — FENTANYL CITRATE-0.9 % NACL/PF 10 MCG/ML
100 PLASTIC BAG, INJECTION (ML) INTRAVENOUS EVERY 5 MIN PRN
Status: DISCONTINUED | OUTPATIENT
Start: 2025-04-29 | End: 2025-04-30 | Stop reason: HOSPADM

## 2025-04-29 RX ORDER — LIDOCAINE 40 MG/G
CREAM TOPICAL
Status: DISCONTINUED | OUTPATIENT
Start: 2025-04-29 | End: 2025-04-30 | Stop reason: HOSPADM

## 2025-04-29 RX ORDER — SODIUM CHLORIDE, SODIUM LACTATE, POTASSIUM CHLORIDE, CALCIUM CHLORIDE 600; 310; 30; 20 MG/100ML; MG/100ML; MG/100ML; MG/100ML
INJECTION, SOLUTION INTRAVENOUS CONTINUOUS PRN
Status: DISCONTINUED | OUTPATIENT
Start: 2025-04-29 | End: 2025-04-30 | Stop reason: HOSPADM

## 2025-04-29 RX ORDER — BUPIVACAINE HYDROCHLORIDE 2.5 MG/ML
INJECTION, SOLUTION EPIDURAL; INFILTRATION; INTRACAUDAL; PERINEURAL
Status: COMPLETED | OUTPATIENT
Start: 2025-04-29 | End: 2025-04-30

## 2025-04-29 RX ORDER — OXYTOCIN/0.9 % SODIUM CHLORIDE 30/500 ML
1-30 PLASTIC BAG, INJECTION (ML) INTRAVENOUS CONTINUOUS
Status: DISCONTINUED | OUTPATIENT
Start: 2025-04-29 | End: 2025-04-30 | Stop reason: HOSPADM

## 2025-04-29 RX ADMIN — BUPIVACAINE HYDROCHLORIDE 8 ML: 2.5 INJECTION, SOLUTION EPIDURAL; INFILTRATION; INTRACAUDAL at 13:10

## 2025-04-29 RX ADMIN — MORPHINE SULFATE 10 MG: 10 INJECTION, SOLUTION INTRAMUSCULAR; INTRAVENOUS at 02:14

## 2025-04-29 RX ADMIN — SODIUM CHLORIDE, SODIUM LACTATE, POTASSIUM CHLORIDE, AND CALCIUM CHLORIDE 500 ML: .6; .31; .03; .02 INJECTION, SOLUTION INTRAVENOUS at 12:38

## 2025-04-29 RX ADMIN — Medication: at 22:30

## 2025-04-29 RX ADMIN — SODIUM CHLORIDE, SODIUM LACTATE, POTASSIUM CHLORIDE, AND CALCIUM CHLORIDE: .6; .31; .03; .02 INJECTION, SOLUTION INTRAVENOUS at 17:11

## 2025-04-29 RX ADMIN — HYDROXYZINE HYDROCHLORIDE 50 MG: 50 TABLET, FILM COATED ORAL at 02:13

## 2025-04-29 RX ADMIN — SODIUM CHLORIDE, SODIUM LACTATE, POTASSIUM CHLORIDE, AND CALCIUM CHLORIDE: .6; .31; .03; .02 INJECTION, SOLUTION INTRAVENOUS at 10:30

## 2025-04-29 RX ADMIN — Medication 2 MILLI-UNITS/MIN: at 10:35

## 2025-04-29 RX ADMIN — AZATHIOPRINE 50 MG: 50 TABLET ORAL at 10:30

## 2025-04-29 RX ADMIN — Medication: at 13:10

## 2025-04-29 ASSESSMENT — ACTIVITIES OF DAILY LIVING (ADL)
ADLS_ACUITY_SCORE: 23
ADLS_ACUITY_SCORE: 24
ADLS_ACUITY_SCORE: 24
ADLS_ACUITY_SCORE: 23
ADLS_ACUITY_SCORE: 24
ADLS_ACUITY_SCORE: 23
ADLS_ACUITY_SCORE: 23
ADLS_ACUITY_SCORE: 24
ADLS_ACUITY_SCORE: 23
ADLS_ACUITY_SCORE: 24
ADLS_ACUITY_SCORE: 23

## 2025-04-29 NOTE — ANESTHESIA PROCEDURE NOTES
Epidural catheter Procedure Note    Pre-Procedure   Staff -        Anesthesiologist:  Tran Coy MD       Resident/Fellow: Brien Hooker MD       Performed By: resident       Location: OB       Procedure Start/Stop Times: 4/29/2025 12:40 PM and 4/29/2025 1:13 PM       Pre-Anesthestic Checklist: patient identified, IV checked, risks and benefits discussed, informed consent, monitors and equipment checked, pre-op evaluation, at physician/surgeon's request and post-op pain management  Timeout:       Correct Patient: Yes        Correct Procedure: Yes        Correct Site: Yes        Correct Position: Yes   Procedure Documentation  Procedure: epidural catheter         Patient Position: sitting       Patient Prep/Sterile Barriers: sterile gloves, mask, patient draped       Skin prep: Betadine and Chloraprep       Local skin infiltrated with mL of 1% lidocaine.        Insertion Site: L3-4. (midline approach).       Technique: LORT saline and LORT air        CHARITY at 6.5 cm.       Needle Type: Touhy needle       Needle Gauge: 17.        Needle Length (Inches): 3.5        Catheter: 20 G.          Catheter threaded easily.         5 cm epidural space.         Threaded 11.5 cm at skin.         # of attempts: 1 and  # of redirects:  0    Assessment/Narrative         Paresthesias: No.       Test dose of 3 mL lidocaine 1.5% w/ 1:200,000 epinephrine at 12:58 CDT.         Test dose negative, 3 minutes after injection, for signs of intravascular, subdural, or intrathecal injection.       Insertion/Infusion Method: LORT saline and LORT air       Aspiration negative for Heme or CSF via Epidural Catheter.    Medication(s) Administered   0.25% Bupivacaine PF (Epidural) - EPIDURAL   8 mL - 4/29/2025 1:10:00 PM  Medication Administration Time: 4/29/2025 12:40 PM     Comments:  Epi   R/b/a discussed, patient agrees to have an epidural catheter placement.   Local infiltration of skin, advancement of needle without paresthesias,  "excellent Alina to NS.   Catheter advanced without resistance nor paresthesias, negative aspiration for heme or CSF.  Patient tolerated the procedure well, all questions answered.        FOR [facility]) ONLY:   Pain Team Contact information: please page the Pain Team Via [employer]. Search \"Pain\". During daytime hours, please page the attending first. At night please page the resident first.      "

## 2025-04-29 NOTE — PLAN OF CARE
Pt VSS. /80 (Cuff Size: Adult Regular)   Temp 98.2  F (36.8  C) (Oral)   Resp 16   LMP 07/28/2024 . Pt denies pain, does feel tightening at times and occasional minor cramps. Pt has COOK catheter in place. Will give Miso medication if ctx space out enough to do so. Pt denies LOF. Pt denies vision changes, and headache. Will continue current plan of care.

## 2025-04-29 NOTE — PROGRESS NOTES
Strip review.    Vitals:    04/28/25 1717 04/28/25 1800 04/28/25 2119 04/28/25 2338   BP: 118/74  (!) 147/98 129/72   BP Location:   Left arm    Patient Position:   Sitting    Cuff Size:   Adult Regular Adult Regular   Resp:  18 18 16   Temp:  98.6  F (37  C) 98.2  F (36.8  C) 98  F (36.7  C)   TempSrc:  Oral Oral Oral     Baseline 125 w/ moderate variability and no decels. Accels to 150.    [place]: irregular ctx.    Continue IOL     Karina Martinez MD, FACOG  (she/her/hers)    Department of Ob/Gyn/Women's Health  [employer]  [place] [facility]  [address] [place], [place] [address]. [place] [address]  [email or web address]  p. [phone]  f. [phone]

## 2025-04-29 NOTE — PROVIDER NOTIFICATION
04/28/25 2053   Provider Notification   Provider Name/Title Dr. Cole   Method of Notification Electronic Page   Request Evaluate - Remote   Notification Reason Status Update     Gave patient her 3rd vag miso. Patient wants to be checked at 2245 to see about balloon placement at that time.

## 2025-04-29 NOTE — PROGRESS NOTES
[facility]  Labor Progress Note    Subjective:  Patient doing well. Feels better after epidural placed. Amenable to AROM.    Objective:   Patient Vitals for the past 4 hrs:   BP Temp Temp src SpO2   25 1414 -- 98.5  F (36.9  C) Oral --   25 1321 109/59 -- -- --   25 1315 110/55 -- -- --   25 1313 128/72 -- -- --   25 1311 123/76 -- -- --   25 1309 125/75 -- -- --   25 1307 124/73 -- -- 98 %     SVE: 460/-2    FHT: Baseline 140, moderate variability, + accelerations, no decelerations  Axis: 2-3 contractions in 10 minutes    Assessment/Plan:  Yaritza Dias is a 34 year old  at 39w2d by LMP, here for IOL in s/o cHTN.    Labor:  - s/p miso, balloon. Discussed AROM risks and benefits. Patient amenable. Performed without complication for clear fluid. Patient tolerated well  - Pain management: epidural in place  - FWB: Cat 1, reactive and reassuring. Continue EFM and toco.    Tomasz Henao MD  Obstetrics & Gynecology, PGY-2  2025 2:53 PM

## 2025-04-29 NOTE — PROGRESS NOTES
Labor Progress Note    S: Endorses some contractions, feeling crampy     O: Temp: 98.6  F (37  C) Temp src: Oral BP: 118/74     Resp: 18        SVE: /-3   FHT: 140, moderate variability, accels, occasional late and variable decels  [place]: 2-3 in 10    A/P: 35 yo  admitted for IOL iso cHTN. Intermittent cat II tracing throughout day, but currently, category I. Discussed attempting balloon placement with fentanyl vs early epidural and placement with speculum vs additional doses of misoprostol and evaluate at later time. Patient would like decision to decide. Bps normotensive.     Ester Cole MD  Obstetrics and Gynecology, PGY-2  2025 8:33 PM

## 2025-04-29 NOTE — PROGRESS NOTES
Labor Progress Note    S: Overall comfortable. Amenable to epidural and subsequent AROM.     O:   Vitals:    25 1800 25 2119 25 2338 25 0500   BP:  (!) 147/98 129/72 132/80   BP Location:  Left arm     Patient Position:  Sitting     Cuff Size:  Adult Regular Adult Regular Adult Regular   Resp: 18 18 16 16   Temp: 98.6  F (37  C) 98.2  F (36.8  C) 98  F (36.7  C) 98.2  F (36.8  C)   TempSrc: Oral Oral Oral Oral   SVE: Balloon now out, on pitocin, exam deferred due to difficult for patient to tolerate exam   FHT: 135, moderate variability, accels, no decels  [place]: 4 in 10    A/P: 33 yo  admitted for IOL for cHTN. S/p PV miso x 3, and intracervical balloon. Now on pitocin. Cat I. Patient will get epidural and then will plan for AROM.     Fabricio Jacome MD    [facility]  Obstetrics, Gynecology, and Women's Health  12:48 PM 2025

## 2025-04-29 NOTE — PLAN OF CARE
Goal Outcome Evaluation:      Patient is comfortable. Dennis every 3-4 minutes. IV saline locked. See labor sheet for fetal assessment. She ate a small dinner. VSS

## 2025-04-29 NOTE — PROVIDER NOTIFICATION
04/28/25 1926   Provider Notification   Provider Name/Title Dr. Cole   Method of Notification At Bedside     Provider at bedside discussing options with patient on different ways to proceed including placing a jay balloon. Patient wants to wait a little bit to decide. Patient expressed wanting to get an epidural before balloon, as her balloon placement with last pregnancy was very uncomfortable while balloon was in place. Patient will alert nurse on decision once she is ready.

## 2025-04-29 NOTE — PROGRESS NOTES
Labor Progress Note    S: patient endorsing intermittent cramping, feels similar to prior.     O:   Vitals:    25 1717 25 1800 25 2119 25 2338   BP: 118/74  (!) 147/98 129/72   BP Location:   Left arm    Patient Position:   Sitting    Cuff Size:   Adult Regular Adult Regular   Resp:  18 18 16   Temp:  98.6  F (37  C) 98.2  F (36.8  C) 98  F (36.7  C)   TempSrc:  Oral Oral Oral   SVE: /3, difficult for patient to tolerate exam   FHT: 135, moderate variability, accels, no decels  [place]: 4 in 10    A/P: 35 yo  admitted for IOL for cHTN. S/p PV miso x 3, plan to attempt balloon placement with speculum following administration of morphine and vistaril. Mild range BP to normotensive; tachycardic, s/p outpt workup. FWB category I.     Ester Cole MD  Obstetrics and Gynecology, PGY-2  2025 2:05 AM      Addendum: Speculum placed without difficulty, cervix visualized. Anterior lip grasped with ring forceps. Cook catheter inserted, intra uterine balloon filled with 70 ml normal saline. Patient tolerated procedure well. Will wait 30 minutes prior to placement of another misoprostol, given isolated variable deceleration at 0238. FWB otherwise category I, with moderate variability, accels present.      Ester Cole MD  Obstetrics and Gynecology, PGY-2  2025 3:04 AM

## 2025-04-30 LAB
ALBUMIN SERPL BCG-MCNC: 2.6 G/DL (ref 3.5–5.2)
ALP SERPL-CCNC: 89 U/L (ref 40–150)
ALT SERPL W P-5'-P-CCNC: 6 U/L (ref 0–50)
ANION GAP SERPL CALCULATED.3IONS-SCNC: 10 MMOL/L (ref 7–15)
AST SERPL W P-5'-P-CCNC: 16 U/L (ref 0–45)
BILIRUB SERPL-MCNC: 1.1 MG/DL
BUN SERPL-MCNC: 10 MG/DL (ref 6–20)
CALCIUM SERPL-MCNC: 8.2 MG/DL (ref 8.8–10.4)
CHLORIDE SERPL-SCNC: 107 MMOL/L (ref 98–107)
CREAT SERPL-MCNC: 0.61 MG/DL (ref 0.51–0.95)
EGFRCR SERPLBLD CKD-EPI 2021: >90 ML/MIN/1.73M2
ERYTHROCYTE [DISTWIDTH] IN BLOOD BY AUTOMATED COUNT: 16.2 % (ref 10–15)
ERYTHROCYTE [DISTWIDTH] IN BLOOD BY AUTOMATED COUNT: 16.4 % (ref 10–15)
GLUCOSE SERPL-MCNC: 89 MG/DL (ref 70–99)
HCO3 SERPL-SCNC: 19 MMOL/L (ref 22–29)
HCT VFR BLD AUTO: 22.9 % (ref 35–47)
HCT VFR BLD AUTO: 26.5 % (ref 35–47)
HGB BLD-MCNC: 7.5 G/DL (ref 11.7–15.7)
HGB BLD-MCNC: 8.2 G/DL (ref 11.7–15.7)
HGB BLD-MCNC: 8.7 G/DL (ref 11.7–15.7)
MCH RBC QN AUTO: 26.9 PG (ref 26.5–33)
MCH RBC QN AUTO: 27 PG (ref 26.5–33)
MCHC RBC AUTO-ENTMCNC: 32.8 G/DL (ref 31.5–36.5)
MCHC RBC AUTO-ENTMCNC: 32.8 G/DL (ref 31.5–36.5)
MCV RBC AUTO: 82 FL (ref 78–100)
MCV RBC AUTO: 82 FL (ref 78–100)
PLATELET # BLD AUTO: 106 10E3/UL (ref 150–450)
PLATELET # BLD AUTO: 96 10E3/UL (ref 150–450)
POTASSIUM SERPL-SCNC: 4 MMOL/L (ref 3.4–5.3)
PROT SERPL-MCNC: 4.7 G/DL (ref 6.4–8.3)
RBC # BLD AUTO: 2.78 10E6/UL (ref 3.8–5.2)
RBC # BLD AUTO: 3.23 10E6/UL (ref 3.8–5.2)
SODIUM SERPL-SCNC: 136 MMOL/L (ref 135–145)
WBC # BLD AUTO: 11.8 10E3/UL (ref 4–11)
WBC # BLD AUTO: 8.7 10E3/UL (ref 4–11)

## 2025-04-30 PROCEDURE — 59514 CESAREAN DELIVERY ONLY: CPT | Mod: GC | Performed by: STUDENT IN AN ORGANIZED HEALTH CARE EDUCATION/TRAINING PROGRAM

## 2025-04-30 PROCEDURE — 250N000012 HC RX MED GY IP 250 OP 636 PS 637

## 2025-04-30 PROCEDURE — 360N000076 HC SURGERY LEVEL 3, PER MIN: Performed by: STUDENT IN AN ORGANIZED HEALTH CARE EDUCATION/TRAINING PROGRAM

## 2025-04-30 PROCEDURE — 710N000010 HC RECOVERY PHASE 1, LEVEL 2, PER MIN: Performed by: STUDENT IN AN ORGANIZED HEALTH CARE EDUCATION/TRAINING PROGRAM

## 2025-04-30 PROCEDURE — 258N000003 HC RX IP 258 OP 636

## 2025-04-30 PROCEDURE — 85014 HEMATOCRIT: CPT

## 2025-04-30 PROCEDURE — 36415 COLL VENOUS BLD VENIPUNCTURE: CPT

## 2025-04-30 PROCEDURE — 271N000001 HC OR GENERAL SUPPLY NON-STERILE: Performed by: STUDENT IN AN ORGANIZED HEALTH CARE EDUCATION/TRAINING PROGRAM

## 2025-04-30 PROCEDURE — 250N000011 HC RX IP 250 OP 636

## 2025-04-30 PROCEDURE — 250N000011 HC RX IP 250 OP 636: Mod: JZ

## 2025-04-30 PROCEDURE — 370N000017 HC ANESTHESIA TECHNICAL FEE, PER MIN: Performed by: STUDENT IN AN ORGANIZED HEALTH CARE EDUCATION/TRAINING PROGRAM

## 2025-04-30 PROCEDURE — 250N000009 HC RX 250

## 2025-04-30 PROCEDURE — 85018 HEMOGLOBIN: CPT

## 2025-04-30 PROCEDURE — 250N000011 HC RX IP 250 OP 636: Mod: JW | Performed by: STUDENT IN AN ORGANIZED HEALTH CARE EDUCATION/TRAINING PROGRAM

## 2025-04-30 PROCEDURE — 999N000016 HC STATISTIC ATTENDANCE AT DELIVERY

## 2025-04-30 PROCEDURE — 250N000013 HC RX MED GY IP 250 OP 250 PS 637

## 2025-04-30 PROCEDURE — 88307 TISSUE EXAM BY PATHOLOGIST: CPT | Mod: 26 | Performed by: PATHOLOGY

## 2025-04-30 PROCEDURE — 272N000001 HC OR GENERAL SUPPLY STERILE: Performed by: STUDENT IN AN ORGANIZED HEALTH CARE EDUCATION/TRAINING PROGRAM

## 2025-04-30 PROCEDURE — 258N000003 HC RX IP 258 OP 636: Performed by: STUDENT IN AN ORGANIZED HEALTH CARE EDUCATION/TRAINING PROGRAM

## 2025-04-30 PROCEDURE — 88307 TISSUE EXAM BY PATHOLOGIST: CPT | Mod: TC

## 2025-04-30 PROCEDURE — 84155 ASSAY OF PROTEIN SERUM: CPT

## 2025-04-30 PROCEDURE — 120N000002 HC R&B MED SURG/OB UMMC

## 2025-04-30 RX ORDER — ONDANSETRON 2 MG/ML
4 INJECTION INTRAMUSCULAR; INTRAVENOUS EVERY 6 HOURS PRN
Status: DISCONTINUED | OUTPATIENT
Start: 2025-04-30 | End: 2025-05-02 | Stop reason: HOSPADM

## 2025-04-30 RX ORDER — FLUMAZENIL 0.1 MG/ML
0.2 INJECTION, SOLUTION INTRAVENOUS
Status: CANCELLED | OUTPATIENT
Start: 2025-04-30

## 2025-04-30 RX ORDER — NALOXONE HYDROCHLORIDE 0.4 MG/ML
0.2 INJECTION, SOLUTION INTRAMUSCULAR; INTRAVENOUS; SUBCUTANEOUS
Status: CANCELLED | OUTPATIENT
Start: 2025-04-30

## 2025-04-30 RX ORDER — MISOPROSTOL 200 UG/1
800 TABLET ORAL
Status: DISCONTINUED | OUTPATIENT
Start: 2025-04-30 | End: 2025-05-02 | Stop reason: HOSPADM

## 2025-04-30 RX ORDER — CARBOPROST TROMETHAMINE 250 UG/ML
250 INJECTION, SOLUTION INTRAMUSCULAR
Status: DISCONTINUED | OUTPATIENT
Start: 2025-04-30 | End: 2025-04-30 | Stop reason: HOSPADM

## 2025-04-30 RX ORDER — FENTANYL CITRATE 50 UG/ML
25-50 INJECTION, SOLUTION INTRAMUSCULAR; INTRAVENOUS
Status: CANCELLED | OUTPATIENT
Start: 2025-04-30

## 2025-04-30 RX ORDER — LOPERAMIDE HYDROCHLORIDE 2 MG/1
2 CAPSULE ORAL
Status: DISCONTINUED | OUTPATIENT
Start: 2025-04-30 | End: 2025-05-02 | Stop reason: HOSPADM

## 2025-04-30 RX ORDER — DIPHENHYDRAMINE HYDROCHLORIDE 50 MG/ML
50 INJECTION, SOLUTION INTRAMUSCULAR; INTRAVENOUS
Status: DISCONTINUED | OUTPATIENT
Start: 2025-04-30 | End: 2025-05-02 | Stop reason: HOSPADM

## 2025-04-30 RX ORDER — SODIUM PHOSPHATE,MONO-DIBASIC 19G-7G/118
1 ENEMA (ML) RECTAL DAILY PRN
Status: DISCONTINUED | OUTPATIENT
Start: 2025-05-02 | End: 2025-05-02 | Stop reason: HOSPADM

## 2025-04-30 RX ORDER — TRANEXAMIC ACID 10 MG/ML
1 INJECTION, SOLUTION INTRAVENOUS EVERY 30 MIN PRN
Status: DISCONTINUED | OUTPATIENT
Start: 2025-04-30 | End: 2025-04-30 | Stop reason: HOSPADM

## 2025-04-30 RX ORDER — MORPHINE SULFATE 1 MG/ML
INJECTION, SOLUTION EPIDURAL; INTRATHECAL; INTRAVENOUS PRN
Status: DISCONTINUED | OUTPATIENT
Start: 2025-04-30 | End: 2025-04-30

## 2025-04-30 RX ORDER — ALBUTEROL SULFATE 90 UG/1
1-2 INHALANT RESPIRATORY (INHALATION)
Status: DISCONTINUED | OUTPATIENT
Start: 2025-04-30 | End: 2025-05-02 | Stop reason: HOSPADM

## 2025-04-30 RX ORDER — ACETAMINOPHEN 325 MG/1
650 TABLET ORAL EVERY 8 HOURS
Status: DISCONTINUED | OUTPATIENT
Start: 2025-05-01 | End: 2025-05-02 | Stop reason: HOSPADM

## 2025-04-30 RX ORDER — OXYTOCIN/0.9 % SODIUM CHLORIDE 30/500 ML
340 PLASTIC BAG, INJECTION (ML) INTRAVENOUS CONTINUOUS PRN
Status: DISCONTINUED | OUTPATIENT
Start: 2025-04-30 | End: 2025-04-30 | Stop reason: HOSPADM

## 2025-04-30 RX ORDER — MISOPROSTOL 200 UG/1
800 TABLET ORAL
Status: DISCONTINUED | OUTPATIENT
Start: 2025-04-30 | End: 2025-04-30 | Stop reason: HOSPADM

## 2025-04-30 RX ORDER — MISOPROSTOL 200 UG/1
400 TABLET ORAL
Status: DISCONTINUED | OUTPATIENT
Start: 2025-04-30 | End: 2025-04-30 | Stop reason: HOSPADM

## 2025-04-30 RX ORDER — LOPERAMIDE HYDROCHLORIDE 2 MG/1
2 CAPSULE ORAL
Status: DISCONTINUED | OUTPATIENT
Start: 2025-04-30 | End: 2025-04-30 | Stop reason: HOSPADM

## 2025-04-30 RX ORDER — AMOXICILLIN 250 MG
1 CAPSULE ORAL 2 TIMES DAILY
Status: DISCONTINUED | OUTPATIENT
Start: 2025-04-30 | End: 2025-05-02 | Stop reason: HOSPADM

## 2025-04-30 RX ORDER — DIPHENHYDRAMINE HYDROCHLORIDE 50 MG/ML
25 INJECTION, SOLUTION INTRAMUSCULAR; INTRAVENOUS
Status: DISCONTINUED | OUTPATIENT
Start: 2025-04-30 | End: 2025-05-02 | Stop reason: HOSPADM

## 2025-04-30 RX ORDER — CEFAZOLIN SODIUM/WATER 2 G/20 ML
2 SYRINGE (ML) INTRAVENOUS SEE ADMIN INSTRUCTIONS
Status: DISCONTINUED | OUTPATIENT
Start: 2025-04-30 | End: 2025-04-30 | Stop reason: HOSPADM

## 2025-04-30 RX ORDER — HYDROCORTISONE 25 MG/G
CREAM TOPICAL 3 TIMES DAILY PRN
Status: DISCONTINUED | OUTPATIENT
Start: 2025-04-30 | End: 2025-05-02 | Stop reason: HOSPADM

## 2025-04-30 RX ORDER — BISACODYL 10 MG
10 SUPPOSITORY, RECTAL RECTAL DAILY PRN
Status: DISCONTINUED | OUTPATIENT
Start: 2025-05-02 | End: 2025-05-02 | Stop reason: HOSPADM

## 2025-04-30 RX ORDER — ACETAMINOPHEN 325 MG/1
975 TABLET ORAL ONCE
Status: COMPLETED | OUTPATIENT
Start: 2025-04-30 | End: 2025-04-30

## 2025-04-30 RX ORDER — IBUPROFEN 800 MG/1
800 TABLET, FILM COATED ORAL EVERY 6 HOURS
Status: DISCONTINUED | OUTPATIENT
Start: 2025-05-01 | End: 2025-05-02 | Stop reason: HOSPADM

## 2025-04-30 RX ORDER — NALOXONE HYDROCHLORIDE 0.4 MG/ML
0.4 INJECTION, SOLUTION INTRAMUSCULAR; INTRAVENOUS; SUBCUTANEOUS
Status: CANCELLED | OUTPATIENT
Start: 2025-04-30

## 2025-04-30 RX ORDER — KETOROLAC TROMETHAMINE 15 MG/ML
15 INJECTION, SOLUTION INTRAMUSCULAR; INTRAVENOUS EVERY 6 HOURS
Status: COMPLETED | OUTPATIENT
Start: 2025-04-30 | End: 2025-04-30

## 2025-04-30 RX ORDER — METOCLOPRAMIDE HYDROCHLORIDE 5 MG/ML
10 INJECTION INTRAMUSCULAR; INTRAVENOUS EVERY 6 HOURS PRN
Status: DISCONTINUED | OUTPATIENT
Start: 2025-04-30 | End: 2025-05-02 | Stop reason: HOSPADM

## 2025-04-30 RX ORDER — METHYLPREDNISOLONE SODIUM SUCCINATE 40 MG/ML
40 INJECTION INTRAMUSCULAR; INTRAVENOUS
Status: DISCONTINUED | OUTPATIENT
Start: 2025-04-30 | End: 2025-05-02 | Stop reason: HOSPADM

## 2025-04-30 RX ORDER — LIDOCAINE 40 MG/G
CREAM TOPICAL
Status: DISCONTINUED | OUTPATIENT
Start: 2025-04-30 | End: 2025-05-02 | Stop reason: HOSPADM

## 2025-04-30 RX ORDER — METHYLERGONOVINE MALEATE 0.2 MG/ML
200 INJECTION INTRAVENOUS
Status: DISCONTINUED | OUTPATIENT
Start: 2025-04-30 | End: 2025-04-30 | Stop reason: HOSPADM

## 2025-04-30 RX ORDER — METHYLERGONOVINE MALEATE 0.2 MG/ML
200 INJECTION INTRAVENOUS
Status: DISCONTINUED | OUTPATIENT
Start: 2025-04-30 | End: 2025-05-02 | Stop reason: HOSPADM

## 2025-04-30 RX ORDER — OXYTOCIN/0.9 % SODIUM CHLORIDE 30/500 ML
340 PLASTIC BAG, INJECTION (ML) INTRAVENOUS CONTINUOUS PRN
Status: DISCONTINUED | OUTPATIENT
Start: 2025-04-30 | End: 2025-05-02 | Stop reason: HOSPADM

## 2025-04-30 RX ORDER — ACETAMINOPHEN 325 MG/1
975 TABLET ORAL EVERY 6 HOURS
Status: DISCONTINUED | OUTPATIENT
Start: 2025-04-30 | End: 2025-04-30

## 2025-04-30 RX ORDER — TRANEXAMIC ACID 10 MG/ML
1 INJECTION, SOLUTION INTRAVENOUS EVERY 30 MIN PRN
Status: DISCONTINUED | OUTPATIENT
Start: 2025-04-30 | End: 2025-05-02 | Stop reason: HOSPADM

## 2025-04-30 RX ORDER — ENOXAPARIN SODIUM 100 MG/ML
40 INJECTION SUBCUTANEOUS EVERY 24 HOURS
Status: DISCONTINUED | OUTPATIENT
Start: 2025-04-30 | End: 2025-05-01

## 2025-04-30 RX ORDER — OXYTOCIN 10 [USP'U]/ML
10 INJECTION, SOLUTION INTRAMUSCULAR; INTRAVENOUS
Status: DISCONTINUED | OUTPATIENT
Start: 2025-04-30 | End: 2025-05-02 | Stop reason: HOSPADM

## 2025-04-30 RX ORDER — CEFAZOLIN SODIUM/WATER 2 G/20 ML
2 SYRINGE (ML) INTRAVENOUS
Status: COMPLETED | OUTPATIENT
Start: 2025-04-30 | End: 2025-04-30

## 2025-04-30 RX ORDER — OXYTOCIN/0.9 % SODIUM CHLORIDE 30/500 ML
100-340 PLASTIC BAG, INJECTION (ML) INTRAVENOUS CONTINUOUS PRN
Status: DISCONTINUED | OUTPATIENT
Start: 2025-04-30 | End: 2025-05-02 | Stop reason: HOSPADM

## 2025-04-30 RX ORDER — METOCLOPRAMIDE 10 MG/1
10 TABLET ORAL EVERY 6 HOURS PRN
Status: DISCONTINUED | OUTPATIENT
Start: 2025-04-30 | End: 2025-05-02 | Stop reason: HOSPADM

## 2025-04-30 RX ORDER — MISOPROSTOL 200 UG/1
400 TABLET ORAL
Status: DISCONTINUED | OUTPATIENT
Start: 2025-04-30 | End: 2025-05-02 | Stop reason: HOSPADM

## 2025-04-30 RX ORDER — SIMETHICONE 80 MG
80 TABLET,CHEWABLE ORAL 4 TIMES DAILY PRN
Status: DISCONTINUED | OUTPATIENT
Start: 2025-04-30 | End: 2025-05-02 | Stop reason: HOSPADM

## 2025-04-30 RX ORDER — OXYTOCIN 10 [USP'U]/ML
10 INJECTION, SOLUTION INTRAMUSCULAR; INTRAVENOUS
Status: DISCONTINUED | OUTPATIENT
Start: 2025-04-30 | End: 2025-04-30 | Stop reason: HOSPADM

## 2025-04-30 RX ORDER — PROCHLORPERAZINE MALEATE 10 MG
10 TABLET ORAL EVERY 6 HOURS PRN
Status: DISCONTINUED | OUTPATIENT
Start: 2025-04-30 | End: 2025-05-02 | Stop reason: HOSPADM

## 2025-04-30 RX ORDER — DEXTROSE, SODIUM CHLORIDE, SODIUM LACTATE, POTASSIUM CHLORIDE, AND CALCIUM CHLORIDE 5; .6; .31; .03; .02 G/100ML; G/100ML; G/100ML; G/100ML; G/100ML
INJECTION, SOLUTION INTRAVENOUS CONTINUOUS
Status: DISCONTINUED | OUTPATIENT
Start: 2025-04-30 | End: 2025-05-02 | Stop reason: HOSPADM

## 2025-04-30 RX ORDER — AZITHROMYCIN 500 MG/5ML
500 INJECTION, POWDER, LYOPHILIZED, FOR SOLUTION INTRAVENOUS
Status: COMPLETED | OUTPATIENT
Start: 2025-04-30 | End: 2025-04-30

## 2025-04-30 RX ORDER — MEPERIDINE HYDROCHLORIDE 25 MG/ML
25 INJECTION INTRAMUSCULAR; INTRAVENOUS; SUBCUTANEOUS
Status: DISCONTINUED | OUTPATIENT
Start: 2025-04-30 | End: 2025-05-02 | Stop reason: HOSPADM

## 2025-04-30 RX ORDER — SODIUM CHLORIDE, SODIUM LACTATE, POTASSIUM CHLORIDE, CALCIUM CHLORIDE 600; 310; 30; 20 MG/100ML; MG/100ML; MG/100ML; MG/100ML
INJECTION, SOLUTION INTRAVENOUS CONTINUOUS
Status: DISCONTINUED | OUTPATIENT
Start: 2025-04-30 | End: 2025-04-30 | Stop reason: HOSPADM

## 2025-04-30 RX ORDER — LOPERAMIDE HYDROCHLORIDE 2 MG/1
4 CAPSULE ORAL
Status: DISCONTINUED | OUTPATIENT
Start: 2025-04-30 | End: 2025-05-02 | Stop reason: HOSPADM

## 2025-04-30 RX ORDER — CARBOPROST TROMETHAMINE 250 UG/ML
250 INJECTION, SOLUTION INTRAMUSCULAR
Status: DISCONTINUED | OUTPATIENT
Start: 2025-04-30 | End: 2025-05-02 | Stop reason: HOSPADM

## 2025-04-30 RX ORDER — LIDOCAINE 40 MG/G
CREAM TOPICAL
Status: DISCONTINUED | OUTPATIENT
Start: 2025-04-30 | End: 2025-04-30 | Stop reason: HOSPADM

## 2025-04-30 RX ORDER — LOPERAMIDE HYDROCHLORIDE 2 MG/1
4 CAPSULE ORAL
Status: DISCONTINUED | OUTPATIENT
Start: 2025-04-30 | End: 2025-04-30 | Stop reason: HOSPADM

## 2025-04-30 RX ORDER — CITRIC ACID/SODIUM CITRATE 334-500MG
30 SOLUTION, ORAL ORAL
Status: COMPLETED | OUTPATIENT
Start: 2025-04-30 | End: 2025-04-30

## 2025-04-30 RX ORDER — SODIUM CHLORIDE, SODIUM LACTATE, POTASSIUM CHLORIDE, CALCIUM CHLORIDE 600; 310; 30; 20 MG/100ML; MG/100ML; MG/100ML; MG/100ML
INJECTION, SOLUTION INTRAVENOUS
Status: COMPLETED
Start: 2025-04-30 | End: 2025-04-30

## 2025-04-30 RX ORDER — ALBUTEROL SULFATE 0.83 MG/ML
2.5 SOLUTION RESPIRATORY (INHALATION)
Status: DISCONTINUED | OUTPATIENT
Start: 2025-04-30 | End: 2025-05-02 | Stop reason: HOSPADM

## 2025-04-30 RX ORDER — AMOXICILLIN 250 MG
2 CAPSULE ORAL 2 TIMES DAILY
Status: DISCONTINUED | OUTPATIENT
Start: 2025-04-30 | End: 2025-05-02 | Stop reason: HOSPADM

## 2025-04-30 RX ORDER — OXYCODONE HYDROCHLORIDE 5 MG/1
5 TABLET ORAL EVERY 4 HOURS PRN
Status: DISCONTINUED | OUTPATIENT
Start: 2025-04-30 | End: 2025-05-02 | Stop reason: HOSPADM

## 2025-04-30 RX ORDER — ONDANSETRON 4 MG/1
4 TABLET, ORALLY DISINTEGRATING ORAL EVERY 6 HOURS PRN
Status: DISCONTINUED | OUTPATIENT
Start: 2025-04-30 | End: 2025-05-02 | Stop reason: HOSPADM

## 2025-04-30 RX ADMIN — SENNOSIDES AND DOCUSATE SODIUM 2 TABLET: 50; 8.6 TABLET ORAL at 09:50

## 2025-04-30 RX ADMIN — LIDOCAINE HYDROCHLORIDE 25 ML: 20 INJECTION, SOLUTION INTRAVENOUS at 03:57

## 2025-04-30 RX ADMIN — KETOROLAC TROMETHAMINE 15 MG: 15 INJECTION, SOLUTION INTRAMUSCULAR; INTRAVENOUS at 04:41

## 2025-04-30 RX ADMIN — Medication 500 MG: at 03:58

## 2025-04-30 RX ADMIN — ONDANSETRON 4 MG: 2 INJECTION INTRAMUSCULAR; INTRAVENOUS at 03:59

## 2025-04-30 RX ADMIN — AZATHIOPRINE 50 MG: 50 TABLET ORAL at 12:00

## 2025-04-30 RX ADMIN — Medication 600 ML/HR: at 04:13

## 2025-04-30 RX ADMIN — SODIUM CHLORIDE, SODIUM LACTATE, POTASSIUM CHLORIDE, AND CALCIUM CHLORIDE 500 ML: .6; .31; .03; .02 INJECTION, SOLUTION INTRAVENOUS at 13:57

## 2025-04-30 RX ADMIN — DEXMEDETOMIDINE HYDROCHLORIDE 12 MCG: 100 INJECTION, SOLUTION INTRAVENOUS at 04:39

## 2025-04-30 RX ADMIN — LIDOCAINE HYDROCHLORIDE 5 ML: 20 INJECTION, SOLUTION INTRAVENOUS at 04:34

## 2025-04-30 RX ADMIN — SODIUM CHLORIDE, SODIUM LACTATE, POTASSIUM CHLORIDE, AND CALCIUM CHLORIDE: .6; .31; .03; .02 INJECTION, SOLUTION INTRAVENOUS at 01:31

## 2025-04-30 RX ADMIN — SODIUM CITRATE AND CITRIC ACID MONOHYDRATE 30 ML: 500; 334 SOLUTION ORAL at 03:43

## 2025-04-30 RX ADMIN — MORPHINE SULFATE 2 MG: 1 INJECTION EPIDURAL; INTRATHECAL; INTRAVENOUS at 04:17

## 2025-04-30 RX ADMIN — PHENYLEPHRINE HYDROCHLORIDE 75 MCG/MIN: 10 INJECTION INTRAVENOUS at 04:02

## 2025-04-30 RX ADMIN — ACETAMINOPHEN 975 MG: 325 TABLET ORAL at 03:43

## 2025-04-30 RX ADMIN — SENNOSIDES AND DOCUSATE SODIUM 2 TABLET: 50; 8.6 TABLET ORAL at 20:32

## 2025-04-30 RX ADMIN — KETOROLAC TROMETHAMINE 15 MG: 15 INJECTION, SOLUTION INTRAMUSCULAR; INTRAVENOUS at 20:32

## 2025-04-30 RX ADMIN — IRON SUCROSE 300 MG: 20 INJECTION, SOLUTION INTRAVENOUS at 21:18

## 2025-04-30 RX ADMIN — ACETAMINOPHEN 975 MG: 325 TABLET ORAL at 09:50

## 2025-04-30 RX ADMIN — BUPIVACAINE HYDROCHLORIDE 4 ML: 2.5 INJECTION, SOLUTION EPIDURAL; INFILTRATION; INTRACAUDAL at 01:57

## 2025-04-30 RX ADMIN — ACETAMINOPHEN 975 MG: 325 TABLET ORAL at 16:09

## 2025-04-30 RX ADMIN — Medication 2 G: at 03:58

## 2025-04-30 RX ADMIN — BUPIVACAINE HYDROCHLORIDE 6 ML: 2.5 INJECTION, SOLUTION EPIDURAL; INFILTRATION; INTRACAUDAL at 01:17

## 2025-04-30 ASSESSMENT — ACTIVITIES OF DAILY LIVING (ADL)
ADLS_ACUITY_SCORE: 24
ADLS_ACUITY_SCORE: 28
ADLS_ACUITY_SCORE: 24
ADLS_ACUITY_SCORE: 28
ADLS_ACUITY_SCORE: 24
ADLS_ACUITY_SCORE: 28
ADLS_ACUITY_SCORE: 28
ADLS_ACUITY_SCORE: 24
ADLS_ACUITY_SCORE: 28
ADLS_ACUITY_SCORE: 24
ADLS_ACUITY_SCORE: 24
ADLS_ACUITY_SCORE: 28
ADLS_ACUITY_SCORE: 24
ADLS_ACUITY_SCORE: 28
ADLS_ACUITY_SCORE: 28
ADLS_ACUITY_SCORE: 24
ADLS_ACUITY_SCORE: 28
ADLS_ACUITY_SCORE: 24

## 2025-04-30 NOTE — PROGRESS NOTES
Labor Progress Note    Presented to room to discuss FHT, which has been persistently category II over last 30 minutes. Discussed concern about fetal oxygenation, and remote from delivery with less than expected cervical change in active labor. Recommended proceeding with  delivery urgently, within 30 minutes. Discussed risks including bleeding, infection, damage to nearby structures. Patient consents to blood transfusion in event of emergency. Written informed consent obtained. Plan for ancef/azithromycin. Patient requested time to process and discuss with her ; at this time, FHT had recovered to 145, moderate variability, and only occasional variable decels. Discussed alternative is to restart pitocin after 30 minutes of category I tracing; and try to titrate back up to try to continue with labor progress; although, given inability to tolerate labor thus far, would be concerned about baby's ability to further tolerate labor and ending up in a more emergent situation. Patient elects to proceed with .     Ester Cole MD  Obstetrics and Gynecology, PGY-2  2025 3:12 AM

## 2025-04-30 NOTE — PLAN OF CARE
Data: Yaritza Dias transferred to [facility] via cart at 0800. Baby transferred via parent's arms.  Action: Receiving unit notified of transfer: Yes. Patient and family notified of room change. Report given to Shay Cutler at 0730. Belongings sent to receiving unit. Accompanied by Registered Nurse. Oriented patient to surroundings. Call light within reach. ID bands double-checked with receiving RN.  Response: Patient tolerated transfer and is stable.

## 2025-04-30 NOTE — PROGRESS NOTES
Patient arrived to [facility] unit via zoom cart at 0755 ,with belongings, accompanied by spouse/ significant other, with infant in arms. Received report from  Shayla Ugarte RN   and checked bands. Unit and room orientation completd. Call light given; no concerns present at this time. Continue with plan of care.

## 2025-04-30 NOTE — PROGRESS NOTES
Labor Progress Note    S: patient endorsing pressure, vaginal pain. Epidural helping with pain relief more on one side than the other, recently got an epidural bolus.     O: Visit Vitals  /80   Temp 98.2  F (36.8  C) (Oral)   Resp 16     SVE: 6/80/-2 per Dr. Young, IUPC placed  FHT: 135, moderate variability, accels, late and variable decels present  [place]: 4 in 10; IUPC placed, 15 mVU per contraction     A/P: 33 yo IOL cHTN at 39w3d. S/p AROM since 1430, on 24mU pitocin, now with IUPC in place in active labor. Struggling with pain control, anesthesia aware. Continue titration with pitocin up to 30 mU. FWB cat II, interventions include repositioning, IV fluid bolus. Will CTM. Bps normotensive.     Ester Cole MD  Obstetrics and Gynecology, PGY-2  04/30/2025 12:55 AM

## 2025-04-30 NOTE — PLAN OF CARE
"  Problem: Adult Inpatient Plan of Care  Goal: Patient-Specific Goal (Individualized)  Description: You can add care plan individualizations to a care plan. Examples of Individualization might be:  \"Parent requests to be called daily at 9am for status\", \"I have a hard time hearing out of my right ear\", or \"Do not touch me to wake me up as it startlesme\".  Outcome: Progressing     Problem: Adult Inpatient Plan of Care  Goal: Optimal Comfort and Wellbeing  Outcome: Progressing  Intervention: Provide Person-Centered Care  Recent Flowsheet Documentation  Taken 4/30/2025 9806 by Shay Cutler RN  Trust Relationship/Rapport:   care explained   choices provided   emotional support provided   empathic listening provided   questions answered   questions encouraged   reassurance provided   thoughts/feelings acknowledged   Goal Outcome Evaluation:           Overall Patient Progress: improvingOverall Patient Progress: improving  Data: VSS and postpartum checks WNL. Patient eating and drinking normally. Patient able to bend knees, jay in placed. Low urine output. . Patient performing self care and able to care for infant. Fundus at U and firm  without massage.  lochia light  and  no blood clots.   Action: Patient taking Tylenol .for pain and declined pain. Encouraged patient to formula  feed every 2 - 3 hours and to monitor for cues to feed infant. Patient education done ( education record). Updated MD on low urine output- Bolus LR ordered and currently infusing.   Response: Patient participating in infant's care by holding . Positive attachment with infant observed. Support/ spouse present at bedside and attentive to infant and patient.   Plan: Continue with the plan of cares.            " Chief Complaint:  Scheduled OB visit    HPI: 27 y.o.  at 24w4d   Positive baby movement  Glucola test today.    Vitals:    10/20/21 0947   BP: 115/83   Weight: 89.4 kg (197 lb)       See OB flowsheet also for pregnancy related data.    A/P  1. Intrauterine pregnancy at 24w4d   2. Pregnancy Risk:  COMPLICATED  By chronic hypertension, on medication      Diagnoses and all orders for this visit:    1. Supervision of other normal pregnancy, antepartum (Primary)  -     POC Urinalysis Dipstick  -     CBC (No Diff)  -     Gestational Diabetes Screen    2. Chronic hypertension affecting pregnancy  -     US Ob 14 + Weeks Single or First Gestation; Future  Ultrasound scheduled in approximately 2 weeks.  Then plan serial ultrasounds every 4 weeks.    3. Obesity (BMI 30-39.9)    Continue prenatal vitamins.  Encouraged fetal kick counts, 10 movements in 2 hours every day.  To labor and delivery if lack fetal movement  Discussed Covid vaccination   Flu vaccine encouraged  Nutrition and weight gain were addressed.  -----------------------  PLAN:   Return in about 4 weeks (around 2021).    Franco Cardona Sr., MD  10/20/2021 10:14 EDT

## 2025-04-30 NOTE — DISCHARGE SUMMARY
[facility] Discharge Summary    Yaritza Dias MRN# [id number]   Age: 34 year old YOB: 1990     Date of Admission:  2025  Date of Discharge:  2025***  Admitting Physician:  Irma Young MD  Discharge Physician:  Elena García MD***             Admission Diagnoses:    at 39w1d  Chronic hypertension  H/o autoimmune hepatitis  Splenomegaly with thrombocytopenia  Previous pregnancy affected by fetal pulmonary valve stenosis  Mild anemia  Tachycardia  Advanced maternal age  H/o postpartum hemorrhage  H/o 3a perineal laceration  H/o GDMA  Fetal urinary tract dilation A1  Marginal cord insertion  Unstable lie          Discharge Diagnosis:     Same, now , delivered   Acute blood loss anemia          Procedures:     Procedure(s): Primary low transverse  section with double layer closure via Pfannenstiel  skin incision  Epidural                Medications Prior to Admission:     Medications Prior to Admission   Medication Sig Dispense Refill Last Dose/Taking    azaTHIOprine (IMURAN) 50 MG tablet Take 1 tablet (50 mg) by mouth daily. 90 tablet 3 2025 Morning    ferrous sulfate (FEROSUL) 325 (65 Fe) MG tablet Take 325 mg by mouth daily (with breakfast).   2025 Morning    Prenatal Vit-DSS-Fe Cbn-FA (PRENATAL AD PO) Take 1 tablet by mouth every evening.   2025    acetaminophen (TYLENOL) 325 MG tablet Take 2 tablets (650 mg) by mouth every 6 hours as needed for mild pain. Start after Delivery. 100 tablet 0     ibuprofen (ADVIL/MOTRIN) 600 MG tablet Take 1 tablet (600 mg) by mouth every 6 hours as needed for moderate pain. Start after delivery 60 tablet 0     senna-docusate (SENOKOT-S/PERICOLACE) 8.6-50 MG tablet Take 1 tablet by mouth daily. Start after delivery. 100 tablet 0     [DISCONTINUED] aspirin 81 MG EC tablet Take 81 mg by mouth every morning.   2025 Morning             Discharge Medications:        Review of your medicines        CONTINUE  these medicines which have NOT CHANGED        Dose / Directions   acetaminophen 325 MG tablet  Commonly known as: TYLENOL  Used for: Supervision of high-risk pregnancy of elderly multigravida      Dose: 650 mg  Take 2 tablets (650 mg) by mouth every 6 hours as needed for mild pain. Start after Delivery.  Quantity: 100 tablet  Refills: 0     azaTHIOprine 50 MG tablet  Commonly known as: IMURAN  Used for: Autoimmune hepatitis (H)      Dose: 50 mg  Take 1 tablet (50 mg) by mouth daily.  Quantity: 90 tablet  Refills: 3     ferrous sulfate 325 (65 Fe) MG tablet  Commonly known as: FEROSUL      Dose: 325 mg  Take 325 mg by mouth daily (with breakfast).  Refills: 0     ibuprofen 600 MG tablet  Commonly known as: ADVIL/MOTRIN  Used for: Supervision of high-risk pregnancy of elderly multigravida      Dose: 600 mg  Take 1 tablet (600 mg) by mouth every 6 hours as needed for moderate pain. Start after delivery  Quantity: 60 tablet  Refills: 0     PRENATAL AD PO      Dose: 1 tablet  Take 1 tablet by mouth every evening.  Refills: 0     senna-docusate 8.6-50 MG tablet  Commonly known as: SENOKOT-S/PERICOLACE  Used for: Supervision of high-risk pregnancy of elderly multigravida      Dose: 1 tablet  Take 1 tablet by mouth daily. Start after delivery.  Quantity: 100 tablet  Refills: 0            STOP taking      aspirin 81 MG EC tablet                       Consultations:   Anesthesia          Brief Admission History:   Ms. Yaritza Dias is a 34 year old now  who initially presented at 39w1d for scheduled induction of labor in the setting of chronic hypertension. She progressed to 6 cm but had category 2 FHT remote from delivery and  delivery was recommended.       Intraoperative course   The procedure was notable for postpartum hemorrhage.  EBL 1228 mL.  See operative report for details.     Findings:  1. Single, viable male infant at 0411 hours on 2025. Apgars of 9 and 9 at one and five minutes.  Birth  weight: 3440 g.  Fetal presentation: Vertex, ANGELIKA. Amniotic fluid: clear.    2. Placenta intact with 3 small caliber vessel cord, marginal insertion at edge of placenta   3. Normal appearing uterus, fallopian tubes. Left ovary with small, physiologic cyst. Right ovary normal.   4.  No intraabdominal adhesions.  No abdominal wall adhesions    Latest Reference Range & Units 04/30/25 04:19   Ph Cord Arterial 7.16 - 7.39  7.25   PCO2 Cord Arterial 35 - 71 mm Hg 46   PO2 Cord Arterial 10 - 33 mm Hg 22   Bicarbonate Cord Arterial 16 - 24 mmol/L 20   Base Excess/Deficit >-10.0 - -2.0 mmol/L -7.1   Ph Cord Blood Venous 7.21 - 7.45  7.35   PCO2 Cord Venous 27 - 57 mm Hg 38   PO2 Cord Venous 21 - 37 mm Hg 27   Bicarbonate Cord Venous 16 - 24 mmol/L 21   Base Excess/Deficit Cord Venous >-10.0 - -2.0 mmol/L -4.0        Postpartum Course   The patient's hospital course was unremarkable. She remained predominantly normotensive in the postpartum period and her laboratory studies were  She recovered as anticipated and experienced no post-operative complications. On discharge, her pain was well controlled. Vaginal bleeding is similar to peak menstrual flow.  Voiding without difficulty.  Ambulating well and tolerating a normal diet.  No fever or significant wound drainage. Formula feeding.  Infant is stable.  No bowel movement yet.***  She was discharged on post-partum day #2***.    Patient was Rh positive and Rhogam was not required. She was Rubella immune.    Post-partum hemoglobin:   Hemoglobin   Date Value Ref Range Status   05/01/2025 7.5 (L) 11.7 - 15.7 g/dL Final             Discharge Instructions and Follow-Up:     Discharge diet: Regular   Discharge activity: No lifting greater than 20 lbs, pushing, pulling, or other strenuous activity for 6 weeks. Pelvic rest for 6 weeks including no sexual intercourse, tampons, or douching. No driving until you can slam on the brakes without pain or while on narcotic pain medications.     Discharge follow-up: Follow up with primary OB for routine postpartum visit in 6 weeks and for blood pressure check within 3-5 days of discharge. Plan to follow up with [facility]***.  Follow up with GI and for liver function tests 4-6 weeks postpartum.   Wound care: Keep incision clean and dry           Discharge Disposition:     Discharged to home in stable condition      ***sign, refresh, update date/time

## 2025-04-30 NOTE — PROVIDER NOTIFICATION
04/30/25 1850   Provider Notification   Provider Name/Title DR. Ester Cole   Method of Notification Phone   Request Evaluate-Remote   Notification Reason Other  (patient wants all proviver to talk to Dr. Ashlee Borden about all medications before ordering. Concerned about medications affecting her liver.)

## 2025-04-30 NOTE — ANESTHESIA CARE TRANSFER NOTE
Patient: Yaritza Dias    Procedure: Procedure(s):   section       Diagnosis: 39 weeks gestation of pregnancy [Z3A.39]  Diagnosis Additional Information: No value filed.    Anesthesia Type:   Epidural, General, Spinal     Note:    Oropharynx: oropharynx clear of all foreign objects and spontaneously breathing  Level of Consciousness: awake  Oxygen Supplementation: room air    Independent Airway: airway patency satisfactory and stable  Dentition: dentition unchanged  Vital Signs Stable: post-procedure vital signs reviewed and stable  Report to RN Given: handoff report given  Patient transferred to: PACU    Handoff Report: Identifed the Patient, Identified the Reponsible Provider, Reviewed the pertinent medical history, Discussed the surgical course, Reviewed Intra-OP anesthesia mangement and issues during anesthesia, Set expectations for post-procedure period and Allowed opportunity for questions and acknowledgement of understanding      Vitals:  Vitals Value Taken Time   /74 25 0516   Temp     Pulse 129 25 0517   Resp 15 25 0516   SpO2 98 % 25 0517   Vitals shown include unfiled device data.    Electronically Signed By: Noah Amin MD  2025  5:18 AM

## 2025-04-30 NOTE — PROGRESS NOTES
Shift note: cared for Yaritza throughout the day, progressing through labor appropriately. Today jimenez catheter came out several hours after placement, pitocin was started at 1035 to continue induction, titrating as indicated per order. Pt got an epidural around 1300 before AROM. Pt comfortable with her epidural, AROM at 1435 by Dr. Henao, clear fluid, see flowsheet. Vital signs notable for 2 mild range blood pressures, about 1 hour minutes apart (1258, & 1351), pt also has been tachycardic, pt reports this started in pregnancy and she was evaluated by getting an EKG and was told everything checked out, providers aware, afebrile. Frequent position changes as tolerated by patient and fetus. Continuous EFM throughout, see for details.

## 2025-04-30 NOTE — PROGRESS NOTES
Labor Progress Note    S: Patient comfortable at rest, but is very uncomfortable with exams.     O:   Vitals:    25   BP: 96/55 (!) 141/84 131/73 130/80   BP Location:       Patient Position:       Cuff Size:       Resp:       Temp:       TempSrc:       SpO2: 97% 97% 97% 98%     SVE: 3-4/60/-3, head not well applied  FHT: 130, moderate variability, accels, occasional early and variable decels  [place]: 4 in 10    A/P: 35 yo  admitted at 39w2d for IOL for cHTN. Patient s/p PV miso x 3, s/p cook, s/p AROM at 1435, now on 14mU pitocin. Repeat exam was again very difficult for patient. Epidural bolused by anesthesia with some improvement. Attempted to place IUPC x 2, but due to angle of cervix and very high fetal station as well as patient discomfort, was unable to place successfully. Reviewed labor curve with patient, and per her request discussed that between 12-18 hours after ROM on pitocin is when we consider this to be an unsuccessful induction. Discussed with bedside RN, charge RN and supervising attending Dr. Young increasing pitocin with goal of contractions to be 5 contractions in 10 minutes on average over 20 minutes, and will re-attempt IUPC at later time when fetal head is more engaged in pelvis and cervix is easier to access. Blood pressures mild range, patient remains asymptomatic. Will repeat HELLP labs in AM. Patient also tachycardic, s/p reassuring workup in antepartum period. FWB largely category I. Continue with position changes and titration of pitocin. All questions answered.     Ester Cole MD  Obstetrics and Gynecology, PGY-2  2025 9:13 PM

## 2025-04-30 NOTE — PROVIDER NOTIFICATION
Ester Cole responded: To  do CBC for now and make sure Platelets are stable before starting Lovenox.    04/30/25 5908   Provider Notification   Provider Name/Title Bhavna eRyna/ Irma Chavez   Method of Notification Phone   Request Evaluate-Remote   Notification Reason Status Update;Lab Results  (Hbg was 8.2 and Platelet is 106. do you still want to give Lovenox? patient is concerned about receiving it.)

## 2025-04-30 NOTE — PROGRESS NOTES
Jay removed and post jay cares explained to patient. Patient was able to ambulate in room without any dizziness before jay removal. Will continue to monitor patient.

## 2025-04-30 NOTE — PROGRESS NOTES
Labor Progress Note    S: patient more comfortable now with epidural.     O:   Vitals:    04/30/25 0122 04/30/25 0123 04/30/25 0127 04/30/25 0129   BP:  112/66  101/57   Resp:       Temp:       TempSrc:       SpO2: 96%  97%      SVE: 6/80/-2, IUPC and FSE placed   FHT: 135, moderate variability, accels, recurrent late decelerations  [place]: 4 in 10, IUPC malpositioned    A/P: 35 yo IOL cHTN at 39w3d. S/p AROM now for ~ 12 hours, on 24mU pitocin, IUPC placed previously but not working correctly, and unable to uptitrate pitocin due to cat II tracing. IUPC replaced. Encourage position changes. Discussed tracing concerns with patient and partner, and if no improvement in next 30 minutes, would be concerned about baby's ability to continue to tolerate the rest of labor. Questions answered at this time.     Ester Cole MD  Obstetrics and Gynecology, PGY-2  04/30/2025 2:08 AM    I personally performed SVE and reviewed FHT and agree with the above note.    Irma Young MD,  04/30/25 2:49 AM

## 2025-04-30 NOTE — PLAN OF CARE
Data: Pt to OB PACU at 0514 via cart. PIV infusing without complications, jay with yellow urine to gravity, pt denies any pain, denies any nausea and vomiting.  Interventions: IV to pump, monitors and alarms on, SCD on.  Response: stable.  Plan: Patient instructed to notify RN for pain or nausea, routine post op cares, initiate breastfeeding/pumping as soon as patient/infant able.

## 2025-04-30 NOTE — OP NOTE
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, no evidence of portal hypertension  - Splenomegaly with thrombocytopenia  - Chronic hypertension  - Previous pregnancy affected by fetal pulmonary valve stenosis requiring  balloon angioplasty  - Mild anemia   - Tachycardia 2/2 anxiety with negative workup  - Advanced maternal age  - H/o PPH (1L EBL, no blood transfusion required)   - H/o 3a perineal laceration  - H/o GDM   - Fetal UTD  - marginal cord insertion   - Category II FHT     Postoperative diagnoses:  -  at 39w3d, now delivered  - post partum hemorrhage  - Marginal cord insertion     Procedure performed:  Primary low segment transverse  section via pfannesntiel skin incision with double layer uterine closure    Anesthesia:  Epidural  Est Blood Loss (mL): 1228 mL  Fluid replacement: 500 mL crystalloid.   UOP: 350 ml  Specimens: cord blood, placenta   Complications: None      Operative findings:   1. Single, viable male infant at 0411 hours on 2025. Apgars of 9 and 9 at one and five minutes.  Birth weight: 3440 g.  Fetal presentation: Vertex, ANGELIKA. Amniotic fluid: clear.    2. Placenta intact with 3 small caliber vessel cord, marginal insertion at edge of placenta   3. Normal appearing uterus, fallopian tubes. Left ovary with small, physiologic cyst. Right ovary normal.   4.  No intraabdominal adhesions.  No abdominal wall adhesions   Latest Reference Range & Units 25 04:19   Ph Cord Arterial 7.16 - 7.39  7.25   PCO2 Cord Arterial 35 - 71 mm Hg 46   PO2 Cord Arterial 10 - 33 mm Hg 22   Bicarbonate Cord Arterial 16 - 24 mmol/L 20   Base Excess/Deficit >-10.0 - -2.0 mmol/L -7.1   Ph Cord Blood Venous 7.21 - 7.45   7.35   PCO2 Cord Venous 27 - 57 mm Hg 38   PO2 Cord Venous 21 - 37 mm Hg 27   Bicarbonate Cord Venous 16 - 24 mmol/L 21   Base Excess/Deficit Cord Venous >-10.0 - -2.0 mmol/L -4.0       Indication: Yaritza Dias is a 34 year old, , who was admitted at 39w1d for IOL iso cHTN. Patient developed category II tracing refractory to intrauterine resuscitation remote from delivery. Recommended  delivery.  The risks, benefits, and alternatives of  delivery were explained and the patient agreed to proceed.     Procedure details:  After obtaining informed consent, the patient was taken to the operating room. She received ancef / azithromycin prior to the skin incision. A jay was placed. She was placed in the dorsal supine position with a leftward tilt and prepped and draped in the usual sterile fashion.     Following test of adequate epidural anesthesia, the abdomen was entered through a pfannenstiel skin incision. The skin incision was made sharply and carried through the subcutaneous tissue to the fascia.  Fascia was incised and dissected from the underlying muscle with blunt dissection. The muscle was  in the midline.      The peritoneum was entered bluntly and the opening extended by digital dissection with care to avoid the bladder. A bladder blade was placed. The lower segment of the uterus was opened sharply in a transverse fashion and extended with digital pressure. The infant's head was noted to be in the ANGELIKA position. It was elevated to the level of the hysterotomy and was delivered atraumatically, shoulders delivered easily thereafter. The cord was doubly clamped after 60 seconds and cut and the infant was handed off to the waiting NICU staff. A segment of the cord was cut and set aside for cord gases if needed.     The placenta was expressed.  The uterus was exteriorized from the abdomen and cleared of all clots and debris.  The uterus was massaged, and was initially boggy.   Pitocin was given through the running IV.  With vigorous massage as well as administration of pitocin, good uterine tone was achieved. The hysterotomy was repaired with 0-vicryl suture in a running locked fashion. A 2nd layer of 0-monocryl was  used to imbricate the incision and good hemostasis was achieved.   The posterior cul-de-sac was cleared of clot and debris and the uterus was returned to the abdomen.      The bilateral pericolic gutters were cleared of clot and debris. The hysterotomy was again inspected and found to be hemostatic.  The abdominal wall was examined and also found to be hemostatic.  The fascia was closed with a running suture of 0-Vicryl.  Subcutaneous tissue was irrigated. Areas that were not hemostatic were controlled with cautery. The subcutaneous tissue was reapproximated with 2-0 vicryl. The skin was closed with 4-0 monocryl. The patient tolerated the procedure well and was taken to the recovery room in stable condition. All sponge, needle and instrument counts were correct x2.     Dr. Young was present for the entire procedure.     Ester Cole MD  Obstetrics and Gyncology, PGY-2  April 30, 2025 , 2:36 AM      I was present and scrubbed for the entire procedure.  Irma Young MD,  05/01/25 9:19 AM

## 2025-05-01 PROBLEM — O10.919 CHRONIC HYPERTENSION AFFECTING PREGNANCY: Status: ACTIVE | Noted: 2025-05-01

## 2025-05-01 LAB
ALBUMIN SERPL BCG-MCNC: 2.7 G/DL (ref 3.5–5.2)
ALP SERPL-CCNC: 81 U/L (ref 40–150)
ALT SERPL W P-5'-P-CCNC: 17 U/L (ref 0–50)
ANION GAP SERPL CALCULATED.3IONS-SCNC: 8 MMOL/L (ref 7–15)
AST SERPL W P-5'-P-CCNC: 29 U/L (ref 0–45)
BILIRUB SERPL-MCNC: 0.4 MG/DL
BUN SERPL-MCNC: 11.2 MG/DL (ref 6–20)
CALCIUM SERPL-MCNC: 9.3 MG/DL (ref 8.8–10.4)
CHLORIDE SERPL-SCNC: 107 MMOL/L (ref 98–107)
CREAT SERPL-MCNC: 0.7 MG/DL (ref 0.51–0.95)
EGFRCR SERPLBLD CKD-EPI 2021: >90 ML/MIN/1.73M2
ERYTHROCYTE [DISTWIDTH] IN BLOOD BY AUTOMATED COUNT: 16.7 % (ref 10–15)
GLUCOSE SERPL-MCNC: 87 MG/DL (ref 70–99)
HCO3 SERPL-SCNC: 22 MMOL/L (ref 22–29)
HCT VFR BLD AUTO: 23.8 % (ref 35–47)
HGB BLD-MCNC: 7.5 G/DL (ref 11.7–15.7)
MCH RBC QN AUTO: 26.2 PG (ref 26.5–33)
MCHC RBC AUTO-ENTMCNC: 31.5 G/DL (ref 31.5–36.5)
MCV RBC AUTO: 83 FL (ref 78–100)
PLATELET # BLD AUTO: 100 10E3/UL (ref 150–450)
POTASSIUM SERPL-SCNC: 4.3 MMOL/L (ref 3.4–5.3)
PROT SERPL-MCNC: 4.9 G/DL (ref 6.4–8.3)
RBC # BLD AUTO: 2.86 10E6/UL (ref 3.8–5.2)
SODIUM SERPL-SCNC: 137 MMOL/L (ref 135–145)
WBC # BLD AUTO: 7.1 10E3/UL (ref 4–11)

## 2025-05-01 PROCEDURE — 250N000013 HC RX MED GY IP 250 OP 250 PS 637

## 2025-05-01 PROCEDURE — 250N000012 HC RX MED GY IP 250 OP 636 PS 637

## 2025-05-01 PROCEDURE — 85018 HEMOGLOBIN: CPT

## 2025-05-01 PROCEDURE — 250N000011 HC RX IP 250 OP 636

## 2025-05-01 PROCEDURE — 120N000002 HC R&B MED SURG/OB UMMC

## 2025-05-01 PROCEDURE — 80053 COMPREHEN METABOLIC PANEL: CPT

## 2025-05-01 PROCEDURE — 99232 SBSQ HOSP IP/OBS MODERATE 35: CPT | Mod: GC | Performed by: OBSTETRICS & GYNECOLOGY

## 2025-05-01 PROCEDURE — 36415 COLL VENOUS BLD VENIPUNCTURE: CPT

## 2025-05-01 RX ORDER — ENOXAPARIN SODIUM 100 MG/ML
40 INJECTION SUBCUTANEOUS EVERY 24 HOURS
Status: DISCONTINUED | OUTPATIENT
Start: 2025-05-01 | End: 2025-05-02 | Stop reason: HOSPADM

## 2025-05-01 RX ADMIN — AZATHIOPRINE 50 MG: 50 TABLET ORAL at 08:10

## 2025-05-01 RX ADMIN — ACETAMINOPHEN 650 MG: 325 TABLET ORAL at 16:35

## 2025-05-01 RX ADMIN — ENOXAPARIN SODIUM 40 MG: 40 INJECTION SUBCUTANEOUS at 20:17

## 2025-05-01 RX ADMIN — IBUPROFEN 800 MG: 800 TABLET, FILM COATED ORAL at 11:10

## 2025-05-01 RX ADMIN — SENNOSIDES AND DOCUSATE SODIUM 1 TABLET: 50; 8.6 TABLET ORAL at 08:15

## 2025-05-01 RX ADMIN — IBUPROFEN 800 MG: 800 TABLET, FILM COATED ORAL at 17:54

## 2025-05-01 RX ADMIN — SENNOSIDES AND DOCUSATE SODIUM 1 TABLET: 50; 8.6 TABLET ORAL at 08:10

## 2025-05-01 RX ADMIN — ACETAMINOPHEN 650 MG: 325 TABLET ORAL at 00:16

## 2025-05-01 RX ADMIN — IBUPROFEN 800 MG: 800 TABLET, FILM COATED ORAL at 04:29

## 2025-05-01 RX ADMIN — ACETAMINOPHEN 650 MG: 325 TABLET ORAL at 08:10

## 2025-05-01 ASSESSMENT — ACTIVITIES OF DAILY LIVING (ADL)
ADLS_ACUITY_SCORE: 28

## 2025-05-01 NOTE — PROGRESS NOTES
Anesthesia Post-Partum Follow-Up Note After  Delivery with Epidural    Patient: Yaritza Dias    Patient location: Post-partum floor    Anesthesia type: Epidural    Subjective  Yaritza Dias does not complain of pruritis at this time. She denies weakness, denies paresthesia, denies difficulties voiding, denies nausea or vomiting, and denies headache. She is able to ambulate and tolerates regular diet.     Objective  Respiratory Function (RR / SpO2 / Airway Patency): Satisfactory  Cardiac Function (HR / Rhythm / BP): Satisfactory  Strength and sensation lower extremities: Normal  Epidural site: No signs of infection or inflammation    Most recent vitals  /83 (Cuff Size: Adult Regular)   Pulse 98   Temp 36.9  C (98.4  F) (Oral)   Resp 17   Wt 95.4 kg (210 lb 6.4 oz)   LMP 2024   SpO2 100%   Breastfeeding Unknown   BMI 36.12 kg/m      Assessment and plan  Yaritza Dias is a 34 year old female  post partum day #1 s/p  delivery with epidural    At this time, there is no evidence of adverse side effects associated with anesthetic procedures performed. We encourage the continued use of multimodal analgesic therapy for adequate pain management over the next several days, and if any questions arise regarding anesthetic care, please reach out to the obstetric anesthesiology team.     Thank you for including us in the care of this patient.  Brien RHODES Ma, MD

## 2025-05-01 NOTE — PLAN OF CARE
Goal Outcome Evaluation: Vital signs stable. Postpartum assessment WDL. Incision WDL, some drainage noted after shower, interdry in place.. Pain controlled with tylenol and ibuprofen. Patient ambulating independently. Patient reports passing gas. Breastfeeding on cue with minimal assist. Patient and infant bonding well. Will continue with current plan of care.       Plan of Care Reviewed With: patient, spouse    Overall Patient Progress: improvingOverall Patient Progress: improving           Problem: Adult Inpatient Plan of Care  Goal: Optimal Comfort and Wellbeing  Outcome: Progressing  Intervention: Provide Person-Centered Care  Recent Flowsheet Documentation  Taken 2025 0830 by Bettye Fountain RN  Trust Relationship/Rapport:   care explained   choices provided   emotional support provided   empathic listening provided   questions answered   questions encouraged   reassurance provided   thoughts/feelings acknowledged     Problem: Postpartum ( Delivery)  Goal: Successful Parent Role Transition  Outcome: Progressing

## 2025-05-01 NOTE — ANESTHESIA POSTPROCEDURE EVALUATION
Patient: Yaritza Dias    Procedure: Procedure(s):   section       Anesthesia Type:  Epidural, General, Spinal    Note:  Disposition: Inpatient   Postop Pain Control: Uneventful            Sign Out: Well controlled pain   PONV: No   Neuro/Psych: Uneventful            Sign Out: Acceptable/Baseline neuro status   Airway/Respiratory: Uneventful            Sign Out: Acceptable/Baseline resp. status   CV/Hemodynamics: Uneventful            Sign Out: Acceptable CV status; No obvious hypovolemia; No obvious fluid overload   Other NRE:    DID A NON-ROUTINE EVENT OCCUR?      Epidural-to- Updated ASA: 2 Emergent      Last vitals:  Vitals Value Taken Time   /83 25 0807   Temp 36.9  C (98.4  F) 25 0805   Pulse 98 25 0805   Resp 17 25 0429   SpO2 99 % 25 0457   Vitals shown include unfiled device data.    Electronically Signed By: Rafael Carpio MD  2025  7:10 AM

## 2025-05-01 NOTE — PROGRESS NOTES
[facility]   Post-partum Progress Note    Name:  Yaritza Dias  MRN: [id number]    S: Yaritza is feeling pretty well this morning. Her pain is well controlled on oral pain medications. She is ambulating without difficulty. She is tolerating PO intake without nausea or vomiting. She is voiding spontaneously and has passed flatus. Lochia is fairly minimal. No headaches, vision changes, chest pain, shortness of breath, or RUQ pain.     O:   Patient Vitals for the past 24 hrs:   BP Temp Temp src Pulse Resp SpO2 Weight   05/01/25 0511 -- -- -- -- -- -- 95.4 kg (210 lb 6.4 oz)   05/01/25 0429 132/86 98.3  F (36.8  C) Oral 103 17 100 % --   05/01/25 0245 -- -- -- -- -- 100 % --   05/01/25 0000 121/77 98.7  F (37.1  C) Oral 111 17 97 % --   04/30/25 2315 118/70 98.2  F (36.8  C) -- 116 16 97 % --   04/30/25 2300 125/67 98.1  F (36.7  C) Oral 111 16 97 % --   04/30/25 2245 121/79 98.3  F (36.8  C) Oral 118 16 98 % --   04/30/25 2231 -- 98.2  F (36.8  C) Oral 116 17 -- --   04/30/25 2200 119/79 -- -- 112 17 99 % --   04/30/25 2145 125/79 -- -- 113 17 98 % --   04/30/25 2130 121/79 98.4  F (36.9  C) Oral 112 16 98 % --   04/30/25 2125 118/75 -- -- 112 17 98 % --   04/30/25 2115 126/76 98.3  F (36.8  C) Oral 110 -- 98 % --   04/30/25 1740 109/75 98.4  F (36.9  C) -- 112 16 99 % --   04/30/25 1315 114/75 98.6  F (37  C) Oral 100 16 100 % --   04/30/25 1200 124/83 -- -- -- 18 98 % --   04/30/25 1100 107/71 -- -- -- 16 98 % --   04/30/25 1000 125/82 -- -- -- 18 98 % --   04/30/25 0900 113/82 -- -- -- 16 97 % --   04/30/25 0800 109/72 -- -- -- -- 99 % --   04/30/25 0755 112/73 98.4  F (36.9  C) Oral -- 18 100 % --     Gen:  Resting comfortably, NAD  CV:  RR, well perfused  Pulm:  Non-labored breathing on room air  Abd:  Soft, appropriately ttp, non-distended.Fundus at umbilicus, firm and non-tender.   Incision:  Clean, dry, intact. No erythema, drainage or induration   Ext:   non-tender, trace edema to bilateral lower extremities    I/O last 3 completed shifts:  In: -   Out:  [Urine:]      Assessment/Plan:  Yaritza Dias 34 year old  on POD#1 s/p PLTCS. Vital signs stable.     # Postpartum management  # Acute blood loss anemia secondary to postpartum hemorrhage  Pain: Well-controlled with ibuprofen, tylenol, and oxycodone PRN   GI:  PRN bowel regimen, anti-emetics  : Voiding spontaneously  Hgb: 10.4> EBL 1400> 8.7> 8.2> 7.5> IV Fe. Asymptomatic from acute blood loss anemia secondary to postpartum hemorrhage; will discharge with oral iron.   Rh: Positive  Rubella: immune  Feed: Formula feeding  Infant:  Stable in room   BC: Not planning future pregnancies but does not want medication for contraception. Discussed recommended pregnancy spacing of 18 months.   PPx:  Encourage ambulation, IS, SCDs while confined to bed. Lovenox currently held in the setting of thrombocytopenia. Discussed risks/benefits of lovenox and Yaritza is amenable if thrombocytopenia improves.    # Chronic Hypertension  - Normotensive overnight.   - Not currently on any medications. Will add as indicated.  - HELLP labs normal aside from thrombocytopenia as below, last on ; repeat pending today    # Autoimmune hepatitis  # Thrombocytopenia  Presented in 2021 with abnormal LFTs (  TBR 12.8 DBR 7.6 INR 1.6.) MRI with multiple liver cysts. 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 Started on Prednisone with Azathioprine added. No evidence of portal hypertension or esophageal varices on EGD 24. Stable splenomegaly. Follows with Dr. Borden.  - Continue PTA azathioprine  - LFTs wnl, will repeat today.  - Plan LFTs, CBC w/ diff every 4-6 weeks after delivery.  - Thrombocytopenia attributed to azathioprine and splenomegaly.  - Platelets 123> 106> 96; will hold lovenox at this time. Repeat this AM    # Tachycardia  - Stable to slightly improved in  the postpartum period.  - TSH, EKG wnl  - Consider CT PE if chest pain, oxygen requirement.    Medically Ready for Discharge: Anticipated Tomorrow pending blood pressures and pain control postpartum    Leonid Black MD  OB/GYN PGY-3  05/01/2025 7:10 AM    /83 (Cuff Size: Adult Regular)   Pulse 98   Temp 98.4  F (36.9  C) (Oral)   Resp 17   Wt 95.4 kg (210 lb 6.4 oz)   LMP 07/28/2024   SpO2 100%   Breastfeeding Unknown   BMI 36.12 kg/m      AM labs not collected yet     The patient was seen and examined by me separately from the team.  I have reviewed and agree with the above note.  She is doing well overall this morning-pain is controlled, has not needed any oxycodone yet, +flatus, tolerating a regular diet, bleeding is light.  Continue routine post op care.  Reviewed possible discharge tomorrow.     Rcoio Kelly MD, FACOG

## 2025-05-01 NOTE — PROVIDER NOTIFICATION
05/01/25 0233   Provider Notification   Provider Name/Title Dr. Ester Cole   Method of Notification Electronic Page   Request Evaluate-Remote   Notification Reason Vital Signs Change     7129 pulse ox consistently 85-90 while sleeping. Head of bed elevated, other vital signs WNL. Patient sleeping comfortably, not complaining of chest pain or difficulty breathing.    Callback - Start supplemental oxygen for goal of >90

## 2025-05-01 NOTE — PLAN OF CARE
Goal Outcome Evaluation:      Plan of Care Reviewed With: patient    Overall Patient Progress: improvingOverall Patient Progress: improving       Vital signs stable. Postpartum assessment WDL.pt's oxygen so low when pt was sleeping, notified the doctor and put in 2l of nasal canula, when pt wake up, so2 was normal range and pt request to discontinue the oxygen and pulse oximeter around 4am.  Incision is  with dressing in place. Pain controlled with tylenol and ibuprofen . Patient ambulating independently. No difficult voiding after Oro was  removed,  formula feeding.  Pt is agreeable with her plan of care. Positive attachment behaviors observed with infant. Support person present. Will continue with plan of care.    Goal Outcome Evaluation:       Plan of Care Reviewed With: patient  Overall Patient Progress: improving

## 2025-05-01 NOTE — PROGRESS NOTES
Brief Progress Note    Requested to discuss medication regimen with patient, who was advised to avoid NSAIDs by her GI provider. Discussed that given her stable liver enzymes, short course of tylenol safe for acute post operative pain, and will limit to 2 g over 24h rather than standard 4g. Discussed that recommendation to withhold NSAIDs is due to concern about ongoing bleeding after a procedure with low platelets; however, patient is having normal amount of lochia, and no concern for ongoing bleeding at this time. Discussed rationale for lovenox to prevent blood clots in the post operative and post partum period while in the hospital, but given platelets < 100, will hold at this time. Hemoglobin appropriately down trending for blood loss of 1400 ml with starting hemoglobin of 10.4, patient amenable to IV Fe. If drops below 7 patient is amenable to blood transfusion. Will add flexeril as adjunct if needed, encouraged prn use of oxycodone. CBC/CMP ordered for tomorrow AM. All questions answered.     Ester Cole MD  Obstetrics and Gynecology, PGY-2  04/30/2025 7:39 PM

## 2025-05-02 VITALS
HEART RATE: 92 BPM | TEMPERATURE: 98.1 F | DIASTOLIC BLOOD PRESSURE: 89 MMHG | BODY MASS INDEX: 36.51 KG/M2 | OXYGEN SATURATION: 100 % | WEIGHT: 212.7 LBS | SYSTOLIC BLOOD PRESSURE: 133 MMHG | RESPIRATION RATE: 16 BRPM

## 2025-05-02 VITALS
BODY MASS INDEX: 36.12 KG/M2 | DIASTOLIC BLOOD PRESSURE: 81 MMHG | OXYGEN SATURATION: 100 % | HEART RATE: 108 BPM | WEIGHT: 210.4 LBS | SYSTOLIC BLOOD PRESSURE: 120 MMHG | TEMPERATURE: 98.3 F | RESPIRATION RATE: 16 BRPM

## 2025-05-02 PROCEDURE — 99238 HOSP IP/OBS DSCHRG MGMT 30/<: CPT | Mod: GC | Performed by: OBSTETRICS & GYNECOLOGY

## 2025-05-02 PROCEDURE — 250N000013 HC RX MED GY IP 250 OP 250 PS 637

## 2025-05-02 PROCEDURE — 250N000012 HC RX MED GY IP 250 OP 636 PS 637

## 2025-05-02 RX ADMIN — IBUPROFEN 800 MG: 800 TABLET, FILM COATED ORAL at 05:47

## 2025-05-02 RX ADMIN — IBUPROFEN 800 MG: 800 TABLET, FILM COATED ORAL at 00:04

## 2025-05-02 RX ADMIN — OXYCODONE 5 MG: 5 TABLET ORAL at 03:05

## 2025-05-02 RX ADMIN — AZATHIOPRINE 50 MG: 50 TABLET ORAL at 08:09

## 2025-05-02 RX ADMIN — ACETAMINOPHEN 650 MG: 325 TABLET ORAL at 00:04

## 2025-05-02 RX ADMIN — ACETAMINOPHEN 650 MG: 325 TABLET ORAL at 08:09

## 2025-05-02 RX ADMIN — SENNOSIDES AND DOCUSATE SODIUM 1 TABLET: 50; 8.6 TABLET ORAL at 08:09

## 2025-05-02 ASSESSMENT — ACTIVITIES OF DAILY LIVING (ADL)
ADLS_ACUITY_SCORE: 27

## 2025-05-02 NOTE — PROGRESS NOTES
[facility]   Post-partum Progress Note    Name:  Yaritza Dias  MRN: [id number]    S: Yaritza is feeling *** this morning. Her pain is well controlled*** on oral pain medications. She is ambulating without*** lightheadedness or dizziness. She is tolerating PO intake without nausea or vomiting.*** She is voiding spontaneously*** and has*** passed flatus. Lochia is ***. No headaches, vision changes, chest pain, shortness of breath, or RUQ pain.    O:   Patient Vitals for the past 24 hrs:   BP Temp Temp src Pulse Resp SpO2 Weight   05/01/25 2009 138/76 98.2  F (36.8  C) Oral -- 16 -- --   05/01/25 1634 132/90 -- -- -- -- -- --   05/01/25 1630 -- 98.7  F (37.1  C) Oral 107 16 -- --   05/01/25 1200 137/85 98  F (36.7  C) -- 72 -- -- --   05/01/25 0805 124/83 98.4  F (36.9  C) Oral 98 -- -- --   05/01/25 0511 -- -- -- -- -- -- 95.4 kg (210 lb 6.4 oz)   05/01/25 0429 132/86 98.3  F (36.8  C) Oral 103 17 100 % --   05/01/25 0245 -- -- -- -- -- 100 % --   05/01/25 0000 121/77 98.7  F (37.1  C) Oral 111 17 97 % --   04/30/25 2315 118/70 98.2  F (36.8  C) -- 116 16 97 % --   04/30/25 2300 125/67 98.1  F (36.7  C) Oral 111 16 97 % --   04/30/25 2245 121/79 98.3  F (36.8  C) Oral 118 16 98 % --   04/30/25 2231 -- 98.2  F (36.8  C) Oral 116 17 -- --   04/30/25 2200 119/79 -- -- 112 17 99 % --   04/30/25 2145 125/79 -- -- 113 17 98 % --   04/30/25 2130 121/79 98.4  F (36.9  C) Oral 112 16 98 % --   04/30/25 2125 118/75 -- -- 112 17 98 % --   04/30/25 2115 126/76 98.3  F (36.8  C) Oral 110 -- 98 % --     Gen:  Resting comfortably, NAD  CV:  RR, well perfused  Pulm:  Non-labored breathing on room air  Abd:  Soft, appropriately ttp, non-distended.Fundus at umbilicus, firm and non-tender.***  Incision:  Clean, dry, intact. No erythema, drainage or induration   Ext:  non-tender, trace edema to bilateral lower extremities    I/O last 3 completed shifts:  In: -   Out: 1940  [Urine:1940]      Assessment/Plan:  Yaritza Dias 34 year old  on POD#2 s/p PLTCS for category 2 fetal heart tracing. Vital signs stable. Meeting goals for discharge***.    # Postpartum management  # Acute blood loss anemia secondary to postpartum hemorrhage  Pain: Well-controlled with ibuprofen, tylenol, and oxycodone PRN   GI:  PRN bowel regimen, anti-emetics  : Voiding spontaneously  Hgb: 10.4> EBL 1400> 8.7> 8.2> 7.5> IV Fe. Asymptomatic from acute blood loss anemia secondary to postpartum hemorrhage; will discharge with oral iron.   Rh: Positive  Rubella: immune  Feed: Formula feeding  Infant:  Stable in room   BC: Not planning future pregnancies but does not want medication for contraception. Discussed recommended pregnancy spacing of 18 months.   PPx:  Encourage ambulation, IS, SCDs while confined to bed. Lovenox currently held in the setting of thrombocytopenia. Prophylactic lovenox started POD#1.    # Chronic Hypertension  - Normotensive overnight***  - Not currently on any medications. Will add as indicated.  - HELLP labs normal aside from thrombocytopenia as below, last on ; repeat stable at 100 on .  - Plan to discharge with HOPE BP***    # Autoimmune hepatitis  # Thrombocytopenia  Presented in 2021 with abnormal LFTs (  TBR 12.8 DBR 7.6 INR 1.6.) MRI with multiple liver cysts. 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 Started on Prednisone with Azathioprine added. No evidence of portal hypertension or esophageal varices on EGD 24. Stable splenomegaly. Follows with Dr. Borden.  - Continue PTA azathioprine  - LFTs wnl, last .  - Plan LFTs, CBC w/ diff every 4-6 weeks after delivery.  - Thrombocytopenia attributed to azathioprine and splenomegaly.  - Platelets 123>> 96> 100.    # Tachycardia, improved  - Stable to slightly improved in the postpartum period.  - TSH, EKG wnl  - Consider CT PE if chest pain, worsened  tachycardia.    # Oxygen Requirement  Noted overnight on POD#0-1, on 2L NC for a portion of the time overnight. Patient notes history of snoring.   - Consider outpatient sleep study postpartum.    Medically Ready for Discharge: Anticipated Today ***    Leonid Black MD  OB/GYN PGY-3  05/01/2025 8:26 PM  ***refresh, update time/date, sign

## 2025-05-02 NOTE — DISCHARGE INSTRUCTIONS
Warning Signs after Having a Baby    Keep this paper on your fridge or somewhere else where you can see it.    Call your provider if you have any of these symptoms up to 12 weeks after having your baby.    Thoughts of hurting yourself or your baby  Pain in your chest or trouble breathing  Severe headache not helped by pain medicine  Eyesight concerns (blurry vision, seeing spots or flashes of light, other changes to eyesight)  Fainting, shaking or other signs of a seizure    Call 9-1-1 if you feel that it is an emergency.     The symptoms below can happen to anyone after giving birth. They can be very serious. Call your provider if you have any of these warning signs.    My provider s phone number: _______________________    Losing too much blood (hemorrhage)    Call your provider if you soak through a pad in less than an hour or pass blood clots bigger than a golf ball. These may be signs that you are bleeding too much.    Blood clots in the legs or lungs    After you give birth, your body naturally clots its blood to help prevent blood loss. Sometimes this increased clotting can happen in other areas of the body, like the legs or lungs. This can block your blood flow and be very dangerous.     Call your provider if you:  Have a red, swollen spot on the back of your leg that is warm or painful when you touch it.   Are coughing up blood.     Infection    Call your provider if you have any of these symptoms:  Fever of 100.4 F (38 C) or higher.  Pain or redness around your stitches if you had an incision.   Any yellow, white, or green fluid coming from places where you had stitches or surgery.    Mood Problems (postpartum depression)    Many people feel sad or have mood changes after having a baby. But for some people, these mood swings are worse.     Call your provider right away if you feel so anxious or nervous that you can't care for yourself or your baby.    Preeclampsia (high blood pressure)    Even if you  didn't have high blood pressure when you were pregnant, you are at risk for the high blood pressure disease called preeclampsia. This risk can last up to 12 weeks after giving birth.     Call your provider if you have:   Pain on your right side under your rib cage  Sudden swelling in the hands and face    Remember: You know your body. If something doesn't feel right, get medical help.     For informational purposes only. Not to replace the advice of your health care provider. Copyright 2020 [facility]. All rights reserved. Clinically reviewed by Court Arias, RNC-OB, MSN. [employer] [id number] - Rev 02/23.

## 2025-05-02 NOTE — PLAN OF CARE
Goal Outcome Evaluation:      Plan of Care Reviewed With: patient, spouse    Overall Patient Progress: improvingOverall Patient Progress: improving     Data: Vital signs within normal limits. Postpartum checks within normal limits - see flow record. Patient eating and drinking normally. Patient able to empty bladder independently and is up ambulating. No apparent signs of infection. Incision healing well. Patient performing self cares and is able to care for infant.  Action: Patient medicated during the shift for pain and cramping. See MAR. Patient reassessed within 1 hour after each medication and pain was improved - patient stated she was comfortable. Patient education done about discharge instructions including medications, follow up, when to call with elevated blood pressures. See flow record.  Response: Positive attachment behaviors observed with infant. Support persons  Luke present.   Plan: Anticipate discharge today at 1030.

## 2025-05-02 NOTE — PLAN OF CARE
Goal Outcome Evaluation:      Plan of Care Reviewed With: patient    Overall Patient Progress: improving    Vital signs WDL. Postpartum checks: WDL. Incision: WDL, no apparent signs of infection. Pt eating and drinking without issue. Pt ambulating and voiding independently. Pt performing self-cares. Pt medicated for pain during the shift. Formula feeding, it is going well. Positive attachment behaviors observed with infant. Support person present.

## 2025-05-02 NOTE — PLAN OF CARE
Goal Outcome Evaluation:      Plan of Care Reviewed With: patient, spouse    Overall Patient Progress: improvingOverall Patient Progress: improving    Outcome Evaluation: progressing well    Data: Vital signs within normal limits. Postpartum checks within normal limits - see flow record. Patient eating and drinking normally. Patient able to empty bladder independently and is up ambulating. No apparent signs of infection. Incision healing well. Patient performing self cares and is able to care for infant.  Action: Patient medicated during the shift for pain. See MAR. Patient reassessed within 1 hour after each medication and pain was improved - patient stated she was comfortable.   Response: Positive attachment behaviors observed with infant. Support persons Luke present.   Plan: Anticipate discharge on 5/2 @ 11 AM.      To Do:  [x]Rowley: 0  [x]Birth certificate  [x]Pump: pt formula feeding so no pump needed   [x]Videos:

## 2025-05-06 ENCOUNTER — MEDICAL CORRESPONDENCE (OUTPATIENT)
Dept: HEALTH INFORMATION MANAGEMENT | Facility: CLINIC | Age: 35
End: 2025-05-06
Payer: COMMERCIAL

## 2025-05-19 DIAGNOSIS — K75.4 AUTOIMMUNE HEPATITIS (H): Primary | ICD-10-CM

## 2025-05-20 ENCOUNTER — LAB (OUTPATIENT)
Dept: LAB | Facility: CLINIC | Age: 35
End: 2025-05-20
Payer: COMMERCIAL

## 2025-05-20 DIAGNOSIS — K75.4 AUTOIMMUNE HEPATITIS (H): ICD-10-CM

## 2025-05-20 LAB
ERYTHROCYTE [DISTWIDTH] IN BLOOD BY AUTOMATED COUNT: 16 % (ref 10–15)
HCT VFR BLD AUTO: 36.6 % (ref 35–47)
HGB BLD-MCNC: 11.5 G/DL (ref 11.7–15.7)
INR PPP: 1.01 (ref 0.85–1.15)
MCH RBC QN AUTO: 26.1 PG (ref 26.5–33)
MCHC RBC AUTO-ENTMCNC: 31.4 G/DL (ref 31.5–36.5)
MCV RBC AUTO: 83 FL (ref 78–100)
PLATELET # BLD AUTO: 147 10E3/UL (ref 150–450)
PROTHROMBIN TIME: 13.6 SECONDS (ref 11.8–14.8)
RBC # BLD AUTO: 4.4 10E6/UL (ref 3.8–5.2)
WBC # BLD AUTO: 3.1 10E3/UL (ref 4–11)

## 2025-05-20 PROCEDURE — 85610 PROTHROMBIN TIME: CPT

## 2025-05-20 PROCEDURE — 82248 BILIRUBIN DIRECT: CPT

## 2025-05-20 PROCEDURE — 36415 COLL VENOUS BLD VENIPUNCTURE: CPT

## 2025-05-20 PROCEDURE — 80053 COMPREHEN METABOLIC PANEL: CPT

## 2025-05-20 PROCEDURE — 85027 COMPLETE CBC AUTOMATED: CPT

## 2025-05-20 ASSESSMENT — EDINBURGH POSTNATAL DEPRESSION SCALE (EPDS)
I HAVE BEEN ABLE TO LAUGH AND SEE THE FUNNY SIDE OF THINGS: AS MUCH AS I ALWAYS COULD
THINGS HAVE BEEN GETTING ON TOP OF ME: NO, I HAVE BEEN COPING AS WELL AS EVER
I HAVE BEEN SO UNHAPPY THAT I HAVE BEEN CRYING: NO, NEVER
TOTAL SCORE: 0
THE THOUGHT OF HARMING MYSELF HAS OCCURRED TO ME: NEVER
I HAVE BEEN ANXIOUS OR WORRIED FOR NO GOOD REASON: NO, NOT AT ALL
I HAVE BLAMED MYSELF UNNECESSARILY WHEN THINGS WENT WRONG: NO, NEVER
I HAVE FELT SAD OR MISERABLE: NO, NOT AT ALL
I HAVE FELT SCARED OR PANICKY FOR NO GOOD REASON: NO, NOT AT ALL
I HAVE BEEN SO UNHAPPY THAT I HAVE HAD DIFFICULTY SLEEPING: NOT AT ALL
I HAVE LOOKED FORWARD WITH ENJOYMENT TO THINGS: AS MUCH AS I EVER DID

## 2025-05-21 ENCOUNTER — RESULTS FOLLOW-UP (OUTPATIENT)
Dept: MULTI SPECIALTY CLINIC | Facility: CLINIC | Age: 35
End: 2025-05-21

## 2025-05-21 ENCOUNTER — PRENATAL OFFICE VISIT (OUTPATIENT)
Dept: OBGYN | Facility: CLINIC | Age: 35
End: 2025-05-21
Attending: OBSTETRICS & GYNECOLOGY
Payer: COMMERCIAL

## 2025-05-21 VITALS
HEIGHT: 64 IN | WEIGHT: 191 LBS | BODY MASS INDEX: 32.61 KG/M2 | HEART RATE: 93 BPM | SYSTOLIC BLOOD PRESSURE: 130 MMHG | DIASTOLIC BLOOD PRESSURE: 90 MMHG

## 2025-05-21 DIAGNOSIS — Z98.890 POSTOPERATIVE STATE: Primary | ICD-10-CM

## 2025-05-21 LAB
ALBUMIN SERPL BCG-MCNC: 4.1 G/DL (ref 3.5–5.2)
ALP SERPL-CCNC: 99 U/L (ref 40–150)
ALT SERPL W P-5'-P-CCNC: 17 U/L (ref 0–50)
ANION GAP SERPL CALCULATED.3IONS-SCNC: 10 MMOL/L (ref 7–15)
AST SERPL W P-5'-P-CCNC: 20 U/L (ref 0–45)
BILIRUB SERPL-MCNC: 0.4 MG/DL
BILIRUBIN DIRECT (ROCHE PRO & PURE): 0.21 MG/DL (ref 0–0.45)
BUN SERPL-MCNC: 15.6 MG/DL (ref 6–20)
CALCIUM SERPL-MCNC: 9.3 MG/DL (ref 8.8–10.4)
CHLORIDE SERPL-SCNC: 102 MMOL/L (ref 98–107)
CREAT SERPL-MCNC: 0.76 MG/DL (ref 0.51–0.95)
EGFRCR SERPLBLD CKD-EPI 2021: >90 ML/MIN/1.73M2
GLUCOSE SERPL-MCNC: 96 MG/DL (ref 70–99)
HCO3 SERPL-SCNC: 26 MMOL/L (ref 22–29)
POTASSIUM SERPL-SCNC: 4.2 MMOL/L (ref 3.4–5.3)
PROT SERPL-MCNC: 6.8 G/DL (ref 6.4–8.3)
SODIUM SERPL-SCNC: 138 MMOL/L (ref 135–145)

## 2025-05-21 PROCEDURE — 99213 OFFICE O/P EST LOW 20 MIN: CPT | Performed by: OBSTETRICS & GYNECOLOGY

## 2025-05-21 NOTE — PROGRESS NOTES
"[facility]    CC: Postpartum visit    Subjective:   Yaritza Dias is a 34 year old  who presents for a 2 week postpartum visit.  She had a primary low segment transverse  section on 2025. Her  was complicated by hemorrhage.    Since delivery, she has been doing well. Patient notes that pain was well-controlled with tylenol and ibuprofen; she has not needed to use pain medication for the past week. She is formula feeding; denies breast pain or soreness.  Her infant is doing well.  She has not had intercourse. She continues to have vaginal bleeding at the front of her pads, present since discharge. Bleeding is light and appears oxidized in color. Denies bright red blood. Mood is stable. She has good bowel and bladder function. She and her  are planning to schedule vasectomy this year for contraception and will use condoms until it is effective.    PHQ-9 score: will check at 6 week postpartum visit  Last pap: 10/15/2024  Hemoglobin at discharge: 7.5; recheck at 2025 improved to 11.5      ROS: 10 point ROS neg other than the symptoms noted above in the HPI.     Objective:  BP (!) 130/90   Pulse 93   Ht 1.626 m (5' 4\")   Wt 86.6 kg (191 lb)   LMP 2024   Breastfeeding No   BMI 32.79 kg/m    General: Well-appearing, no distress  Psych: Normal mood and affect  Abdomen: Soft, nontender, no masses  Incision: Clean, dry, and intact. Minimal erythema noted between Steri strips, nontender and not warm to touch. Steristrips removed.  Extremities: No lower extremity edema    Assessment/Plan:  34 year old  ~3 weeks postpartum s/p PLTCS. Here for postpartum visit. Doing well.     # Postpartum visit  - incision is healing well, no difficulties with bottle feeding, mood stable/normal, hemoglobin improving.  - Contraception: currently using condoms;  planning to proceed with vasectomy this year, not yet scheduled; counseling regarding time to the " procedure being effective (3-6 months) reviewed. Return to her fertility reviewed.  - Feeding: formula  - Discussed continuing to avoid lifting over 20 lbs and no strenuous activity for another 3 weeks. Continue on pelvic rest until next postpartum visit.   - Continue taking prenatal vitamin    # Chronic Hypertension  - BP today 130/90. Will continue to monitor.  - Not currently on any medications. Will add as indicated.  - HELLP labs obtained on 5/20/2025 normal.     # Autoimmune hepatitis  # Thrombocytopenia  Presented in June 2021 with abnormal LFTs (  TBR 12.8 DBR 7.6 INR 1.6.) MRI with multiple liver cysts. 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 Started on Prednisone with Azathioprine added. No evidence of portal hypertension or esophageal varices on EGD 11/7/24. Stable splenomegaly. Follows with Dr. Borden and has followup scheduled in September  - Continue PTA azathioprine  - LFTs WNL on 5/20/2025    # Health maintenance  - Next Pap test due 2029  - Follow-up in 1 year for annual exam  - Immunizations reviewed    Mariana Lira, MS3  [employer]    OBGYN Attending Addendum    I appreciate the note above by medical student, Mariana Lira.  I, Jeaneth Altamirano, was present with the medical student, who participated in the service and in the documentation of the note. I have verified the history and personally performed the physical exam and medical decision making. I have edited accordingly and agree with the assessment and plan of care as documented in the note.    Jeaneth Altamirano MD, MSCI    [facility]/OBGYN  05/21/25

## 2025-05-21 NOTE — PATIENT INSTRUCTIONS
Thank you for trusting us with your care!   Please be aware, if you are on Mychart, you may see your results prior to your providers review. If labs are abnormal, we will call or message you on NetClarityt with a follow up plan.    If you need to contact us for questions about:  Symptoms, Scheduling & Medical Questions; Non-urgent (2-3 day response) [employer] message, Urgent (needing response today) [phone] (if after 3:30pm next day response)   Prescriptions: Please call your Pharmacy   Billing: Madelyn [phone] or [facility]:[phone]

## 2025-06-15 ASSESSMENT — EDINBURGH POSTNATAL DEPRESSION SCALE (EPDS)
TOTAL SCORE: 0
I HAVE BEEN ANXIOUS OR WORRIED FOR NO GOOD REASON: NO, NOT AT ALL
I HAVE LOOKED FORWARD WITH ENJOYMENT TO THINGS: AS MUCH AS I EVER DID
I HAVE BEEN SO UNHAPPY THAT I HAVE BEEN CRYING: NO, NEVER
THINGS HAVE BEEN GETTING ON TOP OF ME: NO, I HAVE BEEN COPING AS WELL AS EVER
I HAVE BEEN SO UNHAPPY THAT I HAVE HAD DIFFICULTY SLEEPING: NOT AT ALL
I HAVE BEEN ABLE TO LAUGH AND SEE THE FUNNY SIDE OF THINGS: AS MUCH AS I ALWAYS COULD
I HAVE BLAMED MYSELF UNNECESSARILY WHEN THINGS WENT WRONG: NO, NEVER
I HAVE FELT SAD OR MISERABLE: NO, NOT AT ALL
THE THOUGHT OF HARMING MYSELF HAS OCCURRED TO ME: NEVER
I HAVE FELT SCARED OR PANICKY FOR NO GOOD REASON: NO, NOT AT ALL

## 2025-06-16 ENCOUNTER — PRENATAL OFFICE VISIT (OUTPATIENT)
Dept: OBGYN | Facility: CLINIC | Age: 35
End: 2025-06-16
Attending: OBSTETRICS & GYNECOLOGY
Payer: COMMERCIAL

## 2025-06-16 VITALS
DIASTOLIC BLOOD PRESSURE: 81 MMHG | WEIGHT: 195 LBS | BODY MASS INDEX: 33.47 KG/M2 | HEART RATE: 80 BPM | SYSTOLIC BLOOD PRESSURE: 131 MMHG

## 2025-06-16 PROCEDURE — 99213 OFFICE O/P EST LOW 20 MIN: CPT | Performed by: OBSTETRICS & GYNECOLOGY

## 2025-06-16 NOTE — PROGRESS NOTES
Chief Complaint   Patient presents with    Post Partum Exam       2025          SUBJECTIVE:   Yaritza Dias is here for her 6-week postpartum checkup.     PHQ-9 score:   Hx of Abuse:  No    Delivery Date: 2025.    Delivering provider:  Hector LE.    Type of delivery:  .     Delivery complications:   Infant gender:  boy, weight 7 pounds 9 oz.  Feeding Method:  Bottlefed.  Complications reported with feeding:  none, infant thriving .    Bleeding:  Light.  Duration:  .  Menses resumed:  No  Bowel/Urinary problems:  No    Contraception Planned:  None -- is she planning pregnancy? No  She  has not had intercourse since delivery.       Nano Luu YU       Pt is a 33 y/o  s/p primary c/s on 25.  She is doing well today with no concerns.  She continues to have very light spotting.  She is bottle feeding and baby is doing well.  She denies mood symptoms.  She declines contraception at this time.  Last pap .    ================================================================    EXAM:  /81   Pulse 80   Wt 88.5 kg (195 lb)   LMP 2024   Breastfeeding No   BMI 33.47 kg/m      General: healthy, alert, and no distress  Psych: NEGATIVE  Last PHQ-9 score on record= No Value exists for the : HP#PHQ9  Breasts:  deferred, not breastfeeding  Abdomen: Benign, Soft, flat, non-tender, No masses, organomegaly, and Diastasis less than 1-2 FB  Incision:  well healed   Pelvic exam deferred given no symptoms    ASSESSMENT:   Encounter Diagnosis   Name Primary?    Routine postpartum follow-up Yes      Normal postpartum exam after primary c/s for category II fetal HR tracing remote from delivery  Pregnancy was complicated by:  history of autoimmune hepatitis and chronic HTN    PLAN:    All lifting and activity restrictions are done at this time, ok to resume normal activity and exercise.   RTC 1 year for annual exam, call if desiring contraception before then.   Follow up with  GI/PMD as planned.     Rocio Kelly MD, FACOG

## 2025-06-16 NOTE — PATIENT INSTRUCTIONS
Thank you for trusting us with your care!   Please be aware, if you are on Mychart, you may see your results prior to your providers review. If labs are abnormal, we will call or message you on Granifyt with a follow up plan.    If you need to contact us for questions about:  Symptoms, Scheduling & Medical Questions; Non-urgent (2-3 day response) [employer] message, Urgent (needing response today) [phone] (if after 3:30pm next day response)   Prescriptions: Please call your Pharmacy   Billing: Madelyn [phone] or [facility]:[phone]

## 2025-07-31 LAB
PATH REPORT.COMMENTS IMP SPEC: NORMAL
PATH REPORT.COMMENTS IMP SPEC: NORMAL
PATH REPORT.FINAL DX SPEC: NORMAL
PATH REPORT.GROSS SPEC: NORMAL
PATH REPORT.MICROSCOPIC SPEC OTHER STN: NORMAL
PATH REPORT.RELEVANT HX SPEC: NORMAL
PHOTO IMAGE: NORMAL

## 2025-08-27 ENCOUNTER — LAB (OUTPATIENT)
Dept: LAB | Facility: CLINIC | Age: 35
End: 2025-08-27
Payer: COMMERCIAL

## 2025-08-27 DIAGNOSIS — K75.4 AUTOIMMUNE HEPATITIS (H): ICD-10-CM

## 2025-08-27 LAB
ALBUMIN SERPL BCG-MCNC: 4.3 G/DL (ref 3.5–5.2)
ALP SERPL-CCNC: 68 U/L (ref 40–150)
ALT SERPL W P-5'-P-CCNC: 12 U/L (ref 0–50)
ANION GAP SERPL CALCULATED.3IONS-SCNC: 10 MMOL/L (ref 7–15)
AST SERPL W P-5'-P-CCNC: 20 U/L (ref 0–45)
BILIRUB SERPL-MCNC: 0.6 MG/DL
BILIRUBIN DIRECT (ROCHE PRO & PURE): 0.24 MG/DL (ref 0–0.45)
BUN SERPL-MCNC: 13.3 MG/DL (ref 6–20)
CALCIUM SERPL-MCNC: 9.4 MG/DL (ref 8.8–10.4)
CHLORIDE SERPL-SCNC: 104 MMOL/L (ref 98–107)
CREAT SERPL-MCNC: 0.79 MG/DL (ref 0.51–0.95)
EGFRCR SERPLBLD CKD-EPI 2021: >90 ML/MIN/1.73M2
ERYTHROCYTE [DISTWIDTH] IN BLOOD BY AUTOMATED COUNT: 15.1 % (ref 10–15)
GLUCOSE SERPL-MCNC: 94 MG/DL (ref 70–99)
HCO3 SERPL-SCNC: 26 MMOL/L (ref 22–29)
HCT VFR BLD AUTO: 36.9 % (ref 35–47)
HGB BLD-MCNC: 12 G/DL (ref 11.7–15.7)
INR PPP: 1.14 (ref 0.85–1.15)
MCH RBC QN AUTO: 25.6 PG (ref 26.5–33)
MCHC RBC AUTO-ENTMCNC: 32.5 G/DL (ref 31.5–36.5)
MCV RBC AUTO: 78.7 FL (ref 78–100)
PLATELET # BLD AUTO: 124 10E3/UL (ref 150–450)
POTASSIUM SERPL-SCNC: 4.5 MMOL/L (ref 3.4–5.3)
PROT SERPL-MCNC: 6.8 G/DL (ref 6.4–8.3)
PROTHROMBIN TIME: 14.6 SECONDS (ref 11.8–14.8)
RBC # BLD AUTO: 4.69 10E6/UL (ref 3.8–5.2)
SODIUM SERPL-SCNC: 140 MMOL/L (ref 135–145)
WBC # BLD AUTO: 3.07 10E3/UL (ref 4–11)

## 2025-08-27 PROCEDURE — 85610 PROTHROMBIN TIME: CPT

## 2025-08-27 PROCEDURE — 85027 COMPLETE CBC AUTOMATED: CPT

## 2025-08-27 PROCEDURE — 80053 COMPREHEN METABOLIC PANEL: CPT

## 2025-08-27 PROCEDURE — 36415 COLL VENOUS BLD VENIPUNCTURE: CPT

## 2025-08-27 PROCEDURE — 82248 BILIRUBIN DIRECT: CPT

## (undated) DEVICE — SU MONOCRYL 4-0 PS-2 18" UND Y496G

## (undated) DEVICE — PACK C-SECTION LF PL15OTA83B

## (undated) DEVICE — STOCKING SLEEVE COMPRESSION CALF LG

## (undated) DEVICE — DRSG ABDOMINAL 07 1/2X8" 7197D

## (undated) DEVICE — GLOVE PROTEXIS BLUE W/NEU-THERA 6.5  2D73EB65

## (undated) DEVICE — CATH TRAY FOLEY SURESTEP 16FR WDRAIN BAG STLK LATEX A300316A

## (undated) DEVICE — STRAP KNEE/BODY 31143004

## (undated) DEVICE — SU MONOCRYL 0 CT-1 36" Y346H

## (undated) DEVICE — SOL WATER IRRIG 500ML BOTTLE 2F7113

## (undated) DEVICE — KIT ENDO TURNOVER/PROCEDURE CARRY-ON 101822

## (undated) DEVICE — SU VICRYL 0 CT-1 36" J346H

## (undated) DEVICE — DRSG STERI STRIP 1/4X3" R1541

## (undated) DEVICE — SUCTION MANIFOLD NEPTUNE 2 SYS 1 PORT 702-025-000

## (undated) DEVICE — SUCTION CATH AIRLIFE TRI-FLO W/CONTROL PORT 14FR  T60C

## (undated) DEVICE — GLOVE ESTEEM POWDER FREE SMT 6.0  2D72PT60

## (undated) DEVICE — GOWN IMPERVIOUS 2XL BLUE

## (undated) DEVICE — SOL WATER IRRIG 1000ML BOTTLE 07139-09

## (undated) DEVICE — ENDO BITE BLOCK ADULT OMNI-BLOC

## (undated) DEVICE — ESU PENCIL W/SMOKE EVAC NEPTUNE STRYKER 0703-046-000

## (undated) DEVICE — TUBING SUCTION 12"X1/4" N612

## (undated) DEVICE — PREP CHLORAPREP 26ML TINTED ORANGE  260815

## (undated) DEVICE — SYR 30ML SLIP TIP W/O NDL 302833

## (undated) DEVICE — GLOVE EXAM NITRILE LG PF LATEX FREE 5064

## (undated) DEVICE — SOL NACL 0.9% IRRIG 1000ML BOTTLE 07138-09

## (undated) RX ORDER — SODIUM CHLORIDE 9 MG/ML
INJECTION, SOLUTION INTRAVENOUS
Status: DISPENSED
Start: 2022-03-18

## (undated) RX ORDER — OXYTOCIN/0.9 % SODIUM CHLORIDE 30/500 ML
PLASTIC BAG, INJECTION (ML) INTRAVENOUS
Status: DISPENSED
Start: 2025-04-30

## (undated) RX ORDER — LIDOCAINE HYDROCHLORIDE 10 MG/ML
INJECTION, SOLUTION EPIDURAL; INFILTRATION; INTRACAUDAL; PERINEURAL
Status: DISPENSED
Start: 2022-03-18

## (undated) RX ORDER — OXYCODONE HYDROCHLORIDE 5 MG/1
TABLET ORAL
Status: DISPENSED
Start: 2022-03-18

## (undated) RX ORDER — MORPHINE SULFATE 1 MG/ML
INJECTION, SOLUTION EPIDURAL; INTRATHECAL; INTRAVENOUS
Status: DISPENSED
Start: 2025-04-30

## (undated) RX ORDER — FENTANYL CITRATE 50 UG/ML
INJECTION, SOLUTION INTRAMUSCULAR; INTRAVENOUS
Status: DISPENSED
Start: 2022-03-18

## (undated) RX ORDER — PHENYLEPHRINE HCL IN 0.9% NACL 50MG/250ML
PLASTIC BAG, INJECTION (ML) INTRAVENOUS
Status: DISPENSED
Start: 2025-04-30

## (undated) RX ORDER — LIDOCAINE HYDROCHLORIDE 10 MG/ML
INJECTION, SOLUTION EPIDURAL; INFILTRATION; INTRACAUDAL; PERINEURAL
Status: DISPENSED
Start: 2024-11-07

## (undated) RX ORDER — ONDANSETRON 2 MG/ML
INJECTION INTRAMUSCULAR; INTRAVENOUS
Status: DISPENSED
Start: 2025-04-30